# Patient Record
Sex: FEMALE | Race: WHITE | NOT HISPANIC OR LATINO | Employment: OTHER | URBAN - NONMETROPOLITAN AREA
[De-identification: names, ages, dates, MRNs, and addresses within clinical notes are randomized per-mention and may not be internally consistent; named-entity substitution may affect disease eponyms.]

---

## 2020-08-07 ENCOUNTER — ANESTHESIA (OUTPATIENT)
Dept: EMERGENCY MEDICINE | Facility: OTHER | Age: 65
DRG: 178 | End: 2020-08-07
Payer: MEDICARE

## 2020-08-07 ENCOUNTER — ANESTHESIA EVENT (OUTPATIENT)
Dept: EMERGENCY MEDICINE | Facility: OTHER | Age: 65
DRG: 178 | End: 2020-08-07
Payer: MEDICARE

## 2020-08-07 ENCOUNTER — APPOINTMENT (OUTPATIENT)
Dept: GENERAL RADIOLOGY | Facility: OTHER | Age: 65
DRG: 178 | End: 2020-08-07
Payer: MEDICARE

## 2020-08-07 ENCOUNTER — APPOINTMENT (OUTPATIENT)
Dept: CT IMAGING | Facility: OTHER | Age: 65
DRG: 178 | End: 2020-08-07
Attending: FAMILY MEDICINE
Payer: MEDICARE

## 2020-08-07 ENCOUNTER — HOSPITAL ENCOUNTER (INPATIENT)
Facility: OTHER | Age: 65
LOS: 5 days | Discharge: HOME | DRG: 178 | End: 2020-08-12
Attending: FAMILY MEDICINE | Admitting: INTERNAL MEDICINE
Payer: MEDICARE

## 2020-08-07 ENCOUNTER — APPOINTMENT (OUTPATIENT)
Dept: ULTRASOUND IMAGING | Facility: OTHER | Age: 65
DRG: 178 | End: 2020-08-07
Attending: EMERGENCY MEDICINE
Payer: MEDICARE

## 2020-08-07 DIAGNOSIS — E86.1 HYPOVOLEMIA: ICD-10-CM

## 2020-08-07 DIAGNOSIS — R55 SYNCOPE, UNSPECIFIED SYNCOPE TYPE: ICD-10-CM

## 2020-08-07 DIAGNOSIS — I95.9 HYPOTENSION, UNSPECIFIED HYPOTENSION TYPE: ICD-10-CM

## 2020-08-07 DIAGNOSIS — C91.11 CHRONIC LYMPHOID LEUKEMIA IN REMISSION (H): ICD-10-CM

## 2020-08-07 DIAGNOSIS — C92.11 CHRONIC MYELOID LEUKEMIA IN REMISSION (H): Primary | ICD-10-CM

## 2020-08-07 DIAGNOSIS — E86.9 VOLUME DEPLETION: ICD-10-CM

## 2020-08-07 DIAGNOSIS — Z03.818 ENCNTR FOR OBS FOR SUSP EXPSR TO OTH BIOLG AGENTS RULED OUT: ICD-10-CM

## 2020-08-07 DIAGNOSIS — E86.9 VOLUME DEPLETION, UNSPECIFIED: ICD-10-CM

## 2020-08-07 DIAGNOSIS — Z79.899 ENCOUNTER FOR LONG-TERM (CURRENT) USE OF OTHER MEDICATIONS: ICD-10-CM

## 2020-08-07 DIAGNOSIS — E86.1 HYPOTENSION DUE TO HYPOVOLEMIA: ICD-10-CM

## 2020-08-07 DIAGNOSIS — J18.9 PNEUMONIA DUE TO INFECTIOUS ORGANISM, UNSPECIFIED LATERALITY, UNSPECIFIED PART OF LUNG: ICD-10-CM

## 2020-08-07 PROBLEM — N17.9 AKI (ACUTE KIDNEY INJURY) (H): Status: ACTIVE | Noted: 2020-08-07

## 2020-08-07 PROBLEM — D75.89 MACROCYTOSIS WITHOUT ANEMIA: Status: ACTIVE | Noted: 2017-11-21

## 2020-08-07 PROBLEM — R79.89 D-DIMER, ELEVATED: Status: ACTIVE | Noted: 2020-08-07

## 2020-08-07 LAB
ALBUMIN SERPL-MCNC: 3.9 G/DL (ref 3.5–5.7)
ALBUMIN UR-MCNC: 10 MG/DL
ALP SERPL-CCNC: 55 U/L (ref 34–104)
ALT SERPL W P-5'-P-CCNC: 15 U/L (ref 7–52)
ANION GAP SERPL CALCULATED.3IONS-SCNC: 8 MMOL/L (ref 3–14)
APPEARANCE UR: CLEAR
APTT PPP: 21 SEC (ref 22–37)
AST SERPL W P-5'-P-CCNC: 21 U/L (ref 13–39)
BACTERIA #/AREA URNS HPF: ABNORMAL /HPF
BASOPHILS # BLD AUTO: 0 10E9/L (ref 0–0.2)
BASOPHILS NFR BLD AUTO: 0.2 %
BILIRUB SERPL-MCNC: 0.3 MG/DL (ref 0.3–1)
BILIRUB UR QL STRIP: NEGATIVE
BUN SERPL-MCNC: 18 MG/DL (ref 7–25)
CALCIUM SERPL-MCNC: 8.8 MG/DL (ref 8.6–10.3)
CHLORIDE SERPL-SCNC: 106 MMOL/L (ref 98–107)
CO2 SERPL-SCNC: 23 MMOL/L (ref 21–31)
COLOR UR AUTO: ABNORMAL
CREAT SERPL-MCNC: 1.24 MG/DL (ref 0.6–1.2)
D DIMER PPP DDU-MCNC: 2399 NG/ML D-DU (ref 0–230)
DIFFERENTIAL METHOD BLD: ABNORMAL
EOSINOPHIL # BLD AUTO: 0 10E9/L (ref 0–0.7)
EOSINOPHIL NFR BLD AUTO: 0 %
ERYTHROCYTE [DISTWIDTH] IN BLOOD BY AUTOMATED COUNT: 14.4 % (ref 10–15)
GFR SERPL CREATININE-BSD FRML MDRD: 43 ML/MIN/{1.73_M2}
GLUCOSE SERPL-MCNC: 157 MG/DL (ref 70–105)
GLUCOSE UR STRIP-MCNC: NEGATIVE MG/DL
HCT VFR BLD AUTO: 36.1 % (ref 35–47)
HGB BLD-MCNC: 11.8 G/DL (ref 11.7–15.7)
HGB UR QL STRIP: NEGATIVE
HYALINE CASTS #/AREA URNS LPF: 80 /LPF (ref 0–2)
IMM GRANULOCYTES # BLD: 0 10E9/L (ref 0–0.4)
IMM GRANULOCYTES NFR BLD: 0.4 %
INR PPP: 0.95 (ref 0–1.3)
KETONES UR STRIP-MCNC: NEGATIVE MG/DL
LABORATORY COMMENT REPORT: NORMAL
LACTATE BLD-SCNC: 2.8 MMOL/L (ref 0.7–2)
LACTATE BLD-SCNC: 2.9 MMOL/L (ref 0.7–2)
LEUKOCYTE ESTERASE UR QL STRIP: ABNORMAL
LIPASE SERPL-CCNC: 32 U/L (ref 11–82)
LYMPHOCYTES # BLD AUTO: 0.5 10E9/L (ref 0.8–5.3)
LYMPHOCYTES NFR BLD AUTO: 5.7 %
MAGNESIUM SERPL-MCNC: 2.1 MG/DL (ref 1.9–2.7)
MCH RBC QN AUTO: 34.5 PG (ref 26.5–33)
MCHC RBC AUTO-ENTMCNC: 32.7 G/DL (ref 31.5–36.5)
MCV RBC AUTO: 106 FL (ref 78–100)
MONOCYTES # BLD AUTO: 0.3 10E9/L (ref 0–1.3)
MONOCYTES NFR BLD AUTO: 3.9 %
MUCOUS THREADS #/AREA URNS LPF: PRESENT /LPF
NEUTROPHILS # BLD AUTO: 7.4 10E9/L (ref 1.6–8.3)
NEUTROPHILS NFR BLD AUTO: 89.8 %
NITRATE UR QL: NEGATIVE
PH UR STRIP: 6 PH (ref 5–7)
PLATELET # BLD AUTO: 149 10E9/L (ref 150–450)
POTASSIUM SERPL-SCNC: 3.8 MMOL/L (ref 3.5–5.1)
PROCALCITONIN SERPL-MCNC: <0.5 NG/ML
PROT SERPL-MCNC: 6 G/DL (ref 6.4–8.9)
RBC # BLD AUTO: 3.42 10E12/L (ref 3.8–5.2)
RBC #/AREA URNS AUTO: 10 /HPF (ref 0–2)
SARS-COV-2 RNA SPEC QL NAA+PROBE: NEGATIVE
SARS-COV-2 RNA SPEC QL NAA+PROBE: NORMAL
SODIUM SERPL-SCNC: 137 MMOL/L (ref 134–144)
SOURCE: ABNORMAL
SP GR UR STRIP: >1.035 (ref 1–1.03)
SPECIMEN SOURCE: NORMAL
SPECIMEN SOURCE: NORMAL
SQUAMOUS #/AREA URNS AUTO: 11 /HPF (ref 0–1)
TROPONIN I SERPL-MCNC: 5.1 PG/ML
UROBILINOGEN UR STRIP-MCNC: NORMAL MG/DL (ref 0–2)
WBC # BLD AUTO: 8.2 10E9/L (ref 4–11)
WBC #/AREA URNS AUTO: 29 /HPF (ref 0–5)

## 2020-08-07 PROCEDURE — 85025 COMPLETE CBC W/AUTO DIFF WBC: CPT | Performed by: FAMILY MEDICINE

## 2020-08-07 PROCEDURE — 87040 BLOOD CULTURE FOR BACTERIA: CPT | Performed by: FAMILY MEDICINE

## 2020-08-07 PROCEDURE — 84484 ASSAY OF TROPONIN QUANT: CPT | Performed by: FAMILY MEDICINE

## 2020-08-07 PROCEDURE — 36415 COLL VENOUS BLD VENIPUNCTURE: CPT | Mod: XU | Performed by: EMERGENCY MEDICINE

## 2020-08-07 PROCEDURE — 83605 ASSAY OF LACTIC ACID: CPT | Mod: 91 | Performed by: EMERGENCY MEDICINE

## 2020-08-07 PROCEDURE — 25000128 H RX IP 250 OP 636: Performed by: INTERNAL MEDICINE

## 2020-08-07 PROCEDURE — 93970 EXTREMITY STUDY: CPT

## 2020-08-07 PROCEDURE — 87635 SARS-COV-2 COVID-19 AMP PRB: CPT | Performed by: EMERGENCY MEDICINE

## 2020-08-07 PROCEDURE — 71260 CT THORAX DX C+: CPT

## 2020-08-07 PROCEDURE — 25000125 ZZHC RX 250: Performed by: INTERNAL MEDICINE

## 2020-08-07 PROCEDURE — 83690 ASSAY OF LIPASE: CPT | Performed by: FAMILY MEDICINE

## 2020-08-07 PROCEDURE — 25000128 H RX IP 250 OP 636: Performed by: FAMILY MEDICINE

## 2020-08-07 PROCEDURE — 83605 ASSAY OF LACTIC ACID: CPT | Performed by: FAMILY MEDICINE

## 2020-08-07 PROCEDURE — 96366 THER/PROPH/DIAG IV INF ADDON: CPT | Performed by: FAMILY MEDICINE

## 2020-08-07 PROCEDURE — 96365 THER/PROPH/DIAG IV INF INIT: CPT | Performed by: FAMILY MEDICINE

## 2020-08-07 PROCEDURE — 25800030 ZZH RX IP 258 OP 636: Performed by: INTERNAL MEDICINE

## 2020-08-07 PROCEDURE — 96375 TX/PRO/DX INJ NEW DRUG ADDON: CPT | Performed by: FAMILY MEDICINE

## 2020-08-07 PROCEDURE — 81001 URINALYSIS AUTO W/SCOPE: CPT | Performed by: FAMILY MEDICINE

## 2020-08-07 PROCEDURE — 85730 THROMBOPLASTIN TIME PARTIAL: CPT | Performed by: FAMILY MEDICINE

## 2020-08-07 PROCEDURE — 25000125 ZZHC RX 250: Performed by: FAMILY MEDICINE

## 2020-08-07 PROCEDURE — 25500064 ZZH RX 255 OP 636: Performed by: EMERGENCY MEDICINE

## 2020-08-07 PROCEDURE — 85379 FIBRIN DEGRADATION QUANT: CPT | Performed by: FAMILY MEDICINE

## 2020-08-07 PROCEDURE — 25800030 ZZH RX IP 258 OP 636: Performed by: EMERGENCY MEDICINE

## 2020-08-07 PROCEDURE — 93010 ELECTROCARDIOGRAM REPORT: CPT | Performed by: INTERNAL MEDICINE

## 2020-08-07 PROCEDURE — C9803 HOPD COVID-19 SPEC COLLECT: HCPCS | Performed by: FAMILY MEDICINE

## 2020-08-07 PROCEDURE — 99285 EMERGENCY DEPT VISIT HI MDM: CPT | Mod: 25 | Performed by: FAMILY MEDICINE

## 2020-08-07 PROCEDURE — 84145 PROCALCITONIN (PCT): CPT | Performed by: FAMILY MEDICINE

## 2020-08-07 PROCEDURE — 94640 AIRWAY INHALATION TREATMENT: CPT

## 2020-08-07 PROCEDURE — 99223 1ST HOSP IP/OBS HIGH 75: CPT | Mod: AI | Performed by: INTERNAL MEDICINE

## 2020-08-07 PROCEDURE — 25000128 H RX IP 250 OP 636: Performed by: EMERGENCY MEDICINE

## 2020-08-07 PROCEDURE — 25000132 ZZH RX MED GY IP 250 OP 250 PS 637: Mod: GY | Performed by: INTERNAL MEDICINE

## 2020-08-07 PROCEDURE — 93005 ELECTROCARDIOGRAM TRACING: CPT | Performed by: FAMILY MEDICINE

## 2020-08-07 PROCEDURE — 25000125 ZZHC RX 250: Performed by: NURSE ANESTHETIST, CERTIFIED REGISTERED

## 2020-08-07 PROCEDURE — 80053 COMPREHEN METABOLIC PANEL: CPT | Performed by: FAMILY MEDICINE

## 2020-08-07 PROCEDURE — 85610 PROTHROMBIN TIME: CPT | Performed by: FAMILY MEDICINE

## 2020-08-07 PROCEDURE — 36415 COLL VENOUS BLD VENIPUNCTURE: CPT | Performed by: FAMILY MEDICINE

## 2020-08-07 PROCEDURE — 36600 WITHDRAWAL OF ARTERIAL BLOOD: CPT | Performed by: FAMILY MEDICINE

## 2020-08-07 PROCEDURE — 36598 INJ W/FLUOR EVAL CV DEVICE: CPT | Mod: RT

## 2020-08-07 PROCEDURE — 99285 EMERGENCY DEPT VISIT HI MDM: CPT | Mod: Z6 | Performed by: FAMILY MEDICINE

## 2020-08-07 PROCEDURE — 87086 URINE CULTURE/COLONY COUNT: CPT | Performed by: EMERGENCY MEDICINE

## 2020-08-07 PROCEDURE — 40000275 ZZH STATISTIC RCP TIME EA 10 MIN

## 2020-08-07 PROCEDURE — 83735 ASSAY OF MAGNESIUM: CPT | Performed by: INTERNAL MEDICINE

## 2020-08-07 PROCEDURE — 12000000 ZZH R&B MED SURG/OB

## 2020-08-07 PROCEDURE — 25800030 ZZH RX IP 258 OP 636: Performed by: FAMILY MEDICINE

## 2020-08-07 PROCEDURE — 36410 VNPNXR 3YR/> PHY/QHP DX/THER: CPT | Performed by: NURSE ANESTHETIST, CERTIFIED REGISTERED

## 2020-08-07 RX ORDER — LIDOCAINE HYDROCHLORIDE 10 MG/ML
INJECTION, SOLUTION EPIDURAL; INFILTRATION; INTRACAUDAL; PERINEURAL PRN
Status: DISCONTINUED | OUTPATIENT
Start: 2020-08-07 | End: 2020-08-07

## 2020-08-07 RX ORDER — AMOXICILLIN 250 MG
2 CAPSULE ORAL 2 TIMES DAILY PRN
Status: DISCONTINUED | OUTPATIENT
Start: 2020-08-07 | End: 2020-08-12 | Stop reason: HOSPADM

## 2020-08-07 RX ORDER — PROCHLORPERAZINE 25 MG
12.5 SUPPOSITORY, RECTAL RECTAL EVERY 12 HOURS PRN
Status: DISCONTINUED | OUTPATIENT
Start: 2020-08-07 | End: 2020-08-12 | Stop reason: HOSPADM

## 2020-08-07 RX ORDER — MAGNESIUM SULFATE HEPTAHYDRATE 40 MG/ML
4 INJECTION, SOLUTION INTRAVENOUS EVERY 4 HOURS PRN
Status: DISCONTINUED | OUTPATIENT
Start: 2020-08-07 | End: 2020-08-11

## 2020-08-07 RX ORDER — IODIXANOL 320 MG/ML
100 INJECTION, SOLUTION INTRAVASCULAR ONCE
Status: COMPLETED | OUTPATIENT
Start: 2020-08-07 | End: 2020-08-07

## 2020-08-07 RX ORDER — POTASSIUM CHLORIDE 1500 MG/1
20-40 TABLET, EXTENDED RELEASE ORAL
Status: DISCONTINUED | OUTPATIENT
Start: 2020-08-07 | End: 2020-08-12

## 2020-08-07 RX ORDER — ALBUTEROL SULFATE 0.83 MG/ML
2.5 SOLUTION RESPIRATORY (INHALATION)
Status: DISCONTINUED | OUTPATIENT
Start: 2020-08-07 | End: 2020-08-12 | Stop reason: HOSPADM

## 2020-08-07 RX ORDER — AMOXICILLIN 250 MG
1 CAPSULE ORAL 2 TIMES DAILY PRN
Status: DISCONTINUED | OUTPATIENT
Start: 2020-08-07 | End: 2020-08-12 | Stop reason: HOSPADM

## 2020-08-07 RX ORDER — ONDANSETRON 4 MG/1
4 TABLET, ORALLY DISINTEGRATING ORAL EVERY 6 HOURS PRN
Status: DISCONTINUED | OUTPATIENT
Start: 2020-08-07 | End: 2020-08-12 | Stop reason: HOSPADM

## 2020-08-07 RX ORDER — LIDOCAINE 40 MG/G
CREAM TOPICAL
Status: DISCONTINUED | OUTPATIENT
Start: 2020-08-07 | End: 2020-08-12 | Stop reason: HOSPADM

## 2020-08-07 RX ORDER — SODIUM CHLORIDE 9 MG/ML
INJECTION, SOLUTION INTRAVENOUS CONTINUOUS
Status: DISCONTINUED | OUTPATIENT
Start: 2020-08-07 | End: 2020-08-08

## 2020-08-07 RX ORDER — IODIXANOL 320 MG/ML
30 INJECTION, SOLUTION INTRAVASCULAR ONCE
Status: COMPLETED | OUTPATIENT
Start: 2020-08-07 | End: 2020-08-07

## 2020-08-07 RX ORDER — IPRATROPIUM BROMIDE AND ALBUTEROL SULFATE 2.5; .5 MG/3ML; MG/3ML
3 SOLUTION RESPIRATORY (INHALATION)
Status: DISCONTINUED | OUTPATIENT
Start: 2020-08-07 | End: 2020-08-11

## 2020-08-07 RX ORDER — POTASSIUM CHLORIDE 7.45 MG/ML
10 INJECTION INTRAVENOUS
Status: DISCONTINUED | OUTPATIENT
Start: 2020-08-07 | End: 2020-08-12

## 2020-08-07 RX ORDER — ONDANSETRON 2 MG/ML
4 INJECTION INTRAMUSCULAR; INTRAVENOUS EVERY 6 HOURS PRN
Status: DISCONTINUED | OUTPATIENT
Start: 2020-08-07 | End: 2020-08-12 | Stop reason: HOSPADM

## 2020-08-07 RX ORDER — ALUMINA, MAGNESIA, AND SIMETHICONE 2400; 2400; 240 MG/30ML; MG/30ML; MG/30ML
30 SUSPENSION ORAL EVERY 4 HOURS PRN
Status: DISCONTINUED | OUTPATIENT
Start: 2020-08-07 | End: 2020-08-12 | Stop reason: HOSPADM

## 2020-08-07 RX ORDER — ACETAMINOPHEN 325 MG/1
650 TABLET ORAL EVERY 4 HOURS PRN
Status: DISCONTINUED | OUTPATIENT
Start: 2020-08-07 | End: 2020-08-12 | Stop reason: HOSPADM

## 2020-08-07 RX ORDER — IBUPROFEN 200 MG
200 TABLET ORAL EVERY 6 HOURS PRN
Status: DISCONTINUED | OUTPATIENT
Start: 2020-08-07 | End: 2020-08-12 | Stop reason: HOSPADM

## 2020-08-07 RX ORDER — IBUPROFEN 600 MG/1
600 TABLET, FILM COATED ORAL EVERY 6 HOURS PRN
Status: DISCONTINUED | OUTPATIENT
Start: 2020-08-07 | End: 2020-08-07

## 2020-08-07 RX ORDER — NALOXONE HYDROCHLORIDE 0.4 MG/ML
.1-.4 INJECTION, SOLUTION INTRAMUSCULAR; INTRAVENOUS; SUBCUTANEOUS
Status: DISCONTINUED | OUTPATIENT
Start: 2020-08-07 | End: 2020-08-12 | Stop reason: HOSPADM

## 2020-08-07 RX ORDER — SODIUM CHLORIDE, SODIUM LACTATE, POTASSIUM CHLORIDE, CALCIUM CHLORIDE 600; 310; 30; 20 MG/100ML; MG/100ML; MG/100ML; MG/100ML
INJECTION, SOLUTION INTRAVENOUS CONTINUOUS
Status: DISCONTINUED | OUTPATIENT
Start: 2020-08-07 | End: 2020-08-07

## 2020-08-07 RX ORDER — ONDANSETRON 2 MG/ML
8 INJECTION INTRAMUSCULAR; INTRAVENOUS ONCE
Status: COMPLETED | OUTPATIENT
Start: 2020-08-07 | End: 2020-08-07

## 2020-08-07 RX ORDER — PROCHLORPERAZINE MALEATE 5 MG
5 TABLET ORAL EVERY 6 HOURS PRN
Status: DISCONTINUED | OUTPATIENT
Start: 2020-08-07 | End: 2020-08-12 | Stop reason: HOSPADM

## 2020-08-07 RX ADMIN — Medication 20 MG: at 05:55

## 2020-08-07 RX ADMIN — ONDANSETRON 8 MG: 2 INJECTION INTRAMUSCULAR; INTRAVENOUS at 05:43

## 2020-08-07 RX ADMIN — SODIUM CHLORIDE: 9 INJECTION, SOLUTION INTRAVENOUS at 16:39

## 2020-08-07 RX ADMIN — SODIUM CHLORIDE 1000 ML: 9 INJECTION, SOLUTION INTRAVENOUS at 05:46

## 2020-08-07 RX ADMIN — LIDOCAINE HYDROCHLORIDE 0.5 ML: 10 INJECTION, SOLUTION EPIDURAL; INFILTRATION; INTRACAUDAL; PERINEURAL at 09:00

## 2020-08-07 RX ADMIN — SODIUM CHLORIDE 1000 ML: 9 INJECTION, SOLUTION INTRAVENOUS at 15:27

## 2020-08-07 RX ADMIN — SODIUM CHLORIDE 1000 ML: 9 INJECTION, SOLUTION INTRAVENOUS at 06:47

## 2020-08-07 RX ADMIN — LIDOCAINE HYDROCHLORIDE 0.2 ML: 10 INJECTION, SOLUTION EPIDURAL; INFILTRATION; INTRACAUDAL; PERINEURAL at 08:32

## 2020-08-07 RX ADMIN — IODIXANOL 100 ML: 320 INJECTION, SOLUTION INTRAVASCULAR at 12:29

## 2020-08-07 RX ADMIN — SODIUM CHLORIDE, POTASSIUM CHLORIDE, SODIUM LACTATE AND CALCIUM CHLORIDE: 600; 310; 30; 20 INJECTION, SOLUTION INTRAVENOUS at 08:36

## 2020-08-07 RX ADMIN — IODIXANOL 89 ML: 320 INJECTION, SOLUTION INTRAVASCULAR at 07:41

## 2020-08-07 RX ADMIN — TAZOBACTAM SODIUM AND PIPERACILLIN SODIUM 3.38 G: 375; 3 INJECTION, SOLUTION INTRAVENOUS at 23:45

## 2020-08-07 RX ADMIN — ENOXAPARIN SODIUM 80 MG: 80 INJECTION SUBCUTANEOUS at 18:47

## 2020-08-07 RX ADMIN — IBUPROFEN 200 MG: 200 TABLET, FILM COATED ORAL at 19:55

## 2020-08-07 RX ADMIN — IPRATROPIUM BROMIDE AND ALBUTEROL SULFATE 3 ML: .5; 3 SOLUTION RESPIRATORY (INHALATION) at 18:25

## 2020-08-07 RX ADMIN — TAZOBACTAM SODIUM AND PIPERACILLIN SODIUM 3.38 G: 375; 3 INJECTION, SOLUTION INTRAVENOUS at 18:47

## 2020-08-07 RX ADMIN — LIDOCAINE HYDROCHLORIDE 0.5 ML: 10 INJECTION, SOLUTION EPIDURAL; INFILTRATION; INTRACAUDAL; PERINEURAL at 08:46

## 2020-08-07 RX ADMIN — IODIXANOL 30 ML: 320 INJECTION, SOLUTION INTRAVASCULAR at 11:34

## 2020-08-07 RX ADMIN — TAZOBACTAM SODIUM AND PIPERACILLIN SODIUM 3.38 G: 375; 3 INJECTION, SOLUTION INTRAVENOUS at 13:12

## 2020-08-07 ASSESSMENT — ACTIVITIES OF DAILY LIVING (ADL)
BATHING: 0-->INDEPENDENT
TRANSFERRING: 0-->INDEPENDENT
FALL_HISTORY_WITHIN_LAST_SIX_MONTHS: NO
COGNITION: 0 - NO COGNITION ISSUES REPORTED
SWALLOWING: 0-->SWALLOWS FOODS/LIQUIDS WITHOUT DIFFICULTY
RETIRED_EATING: 0-->INDEPENDENT
ADLS_ACUITY_SCORE: 13
AMBULATION: 0-->INDEPENDENT
TOILETING: 0-->INDEPENDENT
RETIRED_COMMUNICATION: 0-->UNDERSTANDS/COMMUNICATES WITHOUT DIFFICULTY
ADLS_ACUITY_SCORE: 13
DRESS: 0-->INDEPENDENT

## 2020-08-07 ASSESSMENT — ENCOUNTER SYMPTOMS
EYE REDNESS: 0
BRUISES/BLEEDS EASILY: 0
DYSURIA: 0
HEADACHES: 0
FEVER: 0
PALPITATIONS: 0
NAUSEA: 0
WEAKNESS: 1
ABDOMINAL PAIN: 0
SORE THROAT: 0
LIGHT-HEADEDNESS: 1
DIARRHEA: 0
COUGH: 1
VOMITING: 0
FLANK PAIN: 0
FATIGUE: 1
CHILLS: 0
SHORTNESS OF BREATH: 0
BACK PAIN: 0

## 2020-08-07 ASSESSMENT — MIFFLIN-ST. JEOR
SCORE: 1377.76
SCORE: 1358.26
SCORE: 1354.17

## 2020-08-07 NOTE — PROGRESS NOTES
Pt came to CT for CTA of Chest for PE. 18ga in RUE bicep. Flushed NaCl manually with no discomfort to patient. Flushed with Injector and slight discomfort, mostly at IV insertion site. Proceded with CT exam and injected IV Contrast 89mL of Visipaque 320 and did not have any contrast on monitoring images. Checked patient's arm immediately and noted large swelling involving the contrast at IV site. Decision to discontinue imaging study and return patient to ER4.   Notified NABEEL Seo of situation and told her to ice patient's site for now and eval for further swelling. Patient was also given instruction paper about the infiltration and care of her site.

## 2020-08-07 NOTE — H&P
[facility]    History and Physical  Hospitalist       Date of Admission:  8/7/2020    Assessment & Plan   Alison Norwood is a 65 year old female who presents with probable aspiration pneumonia.    Principal Problem:    Pneumonia    Assessment: Elevated lactate, bilateral infiltrates, supplemental oxygen requirement all clinically consistent.  No recent hospitalizations.  Given her syncope and vomiting high suspicion for aspiration.    Plan:   -IV Zosyn day 1  -Scheduled and as needed nebulizer's  - follow-up blood and urine cultures  -COVID negative  -Obtain sputum culture if able  -Incentive spirometry      D-dimer, elevated    Assessment: Severely elevated, possibly an acute phase reactant in the setting of active infection, lower extremity ultrasound negative for DVT.  CT PE protocol nondiagnostic given multiple unsuccessful IV attempts and right EJ infiltration with contrast.  Discussed options regarding PICC line placement with transfer to [place] for repeat PE protocol versus empiric PE treatment with anticoagulation with reassessment of renal function in a.m. with reattempt at more adequate IV placement and CT in a.m. and both patient and  prefer this option.  They do not want to consider internal jugular, femoral or subclavian central lines.  Given no contraindication to anticoagulation.  Treatment and optimization of renal function is reasonable.    Plan:   -Lovenox 80 mg IV every 12  -Reattempt CT PE protocol in a.m.      CHANG (acute kidney injury) (H)    Assessment: Likely prerenal in the setting of acute infection.    Plan:   -Repeat 1 L fluid bolus  -Trend lactate   -monitor renal function daily  -Avoid NSAIDs    Syncope  Assessment: Possibly orthostasis versus vasovagal given her prodrome.  Plan:   -Telemetry   -check orthostatics  -Trend troponins      Chronic myeloid leukemia in remission (H)    Assessment: Chronic    Plan:   -Hold home Gleevec while in  hospital    FEN: regular diet, normal saline at 125 mL/hr  PPX: lovenox    Code Status: No Order    Taras Yusuf    Primary Care Physician   Physician No Ref-Primary    Chief Complaint   syncope    History is obtained from the patient and chart review.    History of Present Illness   Alison Norwood is a 65 year old female who presents with syncope.  Patient has been her normal state of health but started feeling poorly last night after eating dinner.  She felt nauseated like she is going to vomit.  Early this morning she awoke with nausea and vomiting, she was sitting in a chair after a bout of vomiting, felt lightheaded like she was going to faint, lowered herself to the ground, fainted and had full loss of consciousness for nearly 30 seconds, when she came to she had noticed she had vomited and that she was very short of breath with a new nonproductive cough.  She felt extremely weak, EMS was called, she had a blood pressure of 84/55 on initial eval, and was brought to the ER.    In the ER she received Zosyn, 1 L of IV fluids, lactate improved, had multiple issues with IV infiltration including a right EJ for CT PE protocol and she was subsequently admitted for further management.    Past Medical History    I have reviewed this patient's medical history and updated it with pertinent information if needed.   History reviewed. No pertinent past medical history.    Past Surgical History   I have reviewed this patient's surgical history and updated it with pertinent information if needed.  Past Surgical History:   Procedure Laterality Date     IR PORT CHECK RIGHT  8/7/2020       Prior to Admission Medications   Prior to Admission Medications   Prescriptions Last Dose Informant Patient Reported? Taking?   imatinib (GLEEVEC) 400 MG TABS tablet 8/6/2020 at am  Yes Yes   Sig: Take 400 mg by mouth daily      Facility-Administered Medications: None     Allergies   No Known Allergies    Social History   I have reviewed this  patient's social history and updated it with pertinent information if needed. Alison Norwood  reports that she has never smoked. She has never used smokeless tobacco. She reports current alcohol use. She reports that she does not use drugs.    Family History   I have reviewed this patient's family history and updated it with pertinent information if needed.   History reviewed. No pertinent family history.    Review of Systems     REVIEW OF SYSTEMS:    Constitutional: poor energy and appetite, no recent sick contacts, no flu like symptoms.   Eyes: no changes in vision  Ears, nose, mouth, throat, and face: no mouth sores, dysphagia, or odynophagia  Respiratory: no shortness of breath, ongoing nonproductive cough, no wheezing.  Cardiovascular: no chest pain, palpitations, orthopnea, increased lower extremity edema.  Gastrointestinal: no constipation, couple episodes of diarrhea that are now resolved, no further nausea or vomiting, no abdominal pain.   Genitourinary: no dysuria, hematuria, urgency or frequency.   Hematologic/lymphatic: no unintentional weight loss or night sweats.  Musculoskeletal: no pain to extremities.  Endocrine: not a known diabetic.     Physical Exam   Temp: 97.7  F (36.5  C) Temp src: Tympanic BP: 111/58 Pulse: 95 Heart Rate: 103 Resp: 24 SpO2: 95 % O2 Device: None (Room air) Oxygen Delivery: 2 LPM  Vital Signs with Ranges  Temp:  [97.7  F (36.5  C)] 97.7  F (36.5  C)  Pulse:  [] 95  Heart Rate:  [] 103  Resp:  [11-37] 24  BP: ()/() 111/58  SpO2:  [87 %-100 %] 95 %  176 lbs 11.2 oz    Exam:  GENERAL: Talkative, in no apparent distress.  Head: normocephalic and atraumatic  Eyes: anicteric and non-injected sclera  Nose: no rhinorrhea or epistaxis.   Throat: moist mucous membranes with no active oral lesions.  NECK: Supple, jugular venous distension not present.  Right neck with asymmetric nonerythematous edema which has been outlined with marker at site of EJ contrast  extravasation.  CARDIOVASCULAR: regular rate and rhythm, no murmurs, rubs, or gallops. Normal S1/S2. No lower extremity edema.   RESPIRATORY: clear to auscultation bilaterally, no wheezes, no crackles.  No accessory muscle use or evidence of respiratory distress.   GI: soft, non-tender, non-distended, normoactive bowel sounds.  MUSCULOSKELETAL: warm and well perfused, 2+ dorsalis pedis pulses.    SKIN: no pallor, jaundice or rashes.  NEUROLOGY: AAOx3, follows commands, speech and language without focal deficits.        Data   Data reviewed today:  I personally reviewed no images or EKG's today.  Recent Labs   Lab 08/07/20  0600   WBC 8.2   HGB 11.8   *   *   INR 0.95      POTASSIUM 3.8   CHLORIDE 106   CO2 23   BUN 18   CR 1.24*   ANIONGAP 8   MOHINDER 8.8   *   ALBUMIN 3.9   PROTTOTAL 6.0*   BILITOTAL 0.3   ALKPHOS 55   ALT 15   AST 21   LIPASE 32       Recent Results (from the past 24 hour(s))   IR Port Check Right    Narrative    IR PORT CHECK RIGHT    HISTORY: 65 years Female yes  with recent placement of left-sided  external jugular catheter. The provider was uncertain of position of  the catheter, there was no hold back of blood from the lumen.    COMPARISON: None    TECHNIQUE: 30 cc of contrast was injected under fluoroscopic  observation. There is opacification of the external jugular or branch  leading to the external jugular. There was no extravasation of  contrast.    Findings and     Impression    impression: Intraluminal location of central line. The tip of the  central line is within the right external jugular vein or a smaller  side branch of the external jugular vein    ROMAIN VELOZ MD   CT Chest w Contrast    Narrative    PROCEDURE: CT CHEST W CONTRAST 8/7/2020 12:30 PM    HISTORY: PE suspected, intermediate prob, positive D-dimer    COMPARISONS: None.    Meds/Dose Given: Visipaque 320 100 mL    TECHNIQUE: CT angiogram of the chest was performed with IV contrast  sagittal  coronal reconstructions were obtained.    FINDINGS: There is contrast extravasation in the right side of the  neck. No opacification of the pulmonary arteries was obtained.  Extensive alveolar opacities are noted in both lungs most severe in  the lower lobes bilaterally with air bronchograms. Significant opacity  is also present in the right upper lobe and mildly opacity is seen in  the left upper lobe. These opacities are suspicious for pneumonia. The  hilar and mediastinal lymph nodes are normal in caliber. Axillary and  supraclavicular lymph nodes appear normal. The upper portion of the  liver and spleen appear normal. Degenerative changes are present in  the thoracic spine.         Impression    IMPRESSION: Extensive bilateral alveolar opacities in both lungs most  consistent with pneumonia.    OLESYA FRIEND MD   US Lower Extremity Venous Duplex Bilateral    Narrative    Exam:US LOWER EXTREMITY VENOUS DUPLEX BILATERAL    History:  65 years Female   : With shortness of breath and elevated  d-dimer. Patient failed CT angiography.    Comparisons: None    Technique: Venous duplex ultrasonography of the bilateral lower  extremities was performed.     Findings: The common femoral veins, superficial femoral veins and  popliteal veins are fully compressible with spontaneous and  augmentable venous flow.           Impression    Impression: No evidence of deep venous thrombosis within the lower  extremities.    ROMAIN VELOZ MD

## 2020-08-07 NOTE — PROGRESS NOTES
Request for peripheral IV for PE study.      Previous IV infiltrated during CT scan. Multiple attempts were made by RNs and 2 other CRNAs.      Ultrasound was used to visualize both upper extremities. Able to visualize one very small vein in her left upper arm. Attempted to access this vein using a 20 gauge arrowcatheter with inline wire.  Unable to access vein. The right upper arm was used for the second attempt. The A/C was not available due to previous infiltration. Unable to access right upper arm vein.  Right neck-external jugular vein was used for third attempt.  Able to access vein using a long (3 inch) 18 gauge IV needle, but the catheter was difficult to advance.  Withdrew needle until blood was able to be aspirated. Catheter was able to be advanced the second time, but the catheter was not able to be flushed easily.  Withdrew catheter again until blood return was present.  The catheter was able to be flushed easily and was secured using a sterile tegaderm.  A venogram was obtained per radiology, which showed that the EJ was access, or a smaller side branch of the EJ with intraluminal location.  Therefore, the EJ line was used for her PE study.  After a test injection was made by the radiology staff, the IV was deemed to be suitable for the study.  However, once the contrast was injected, the IV become infiltrated and a large amount of contrast was injected into the subcutaneus aspect of her neck.  This was outlined by the radiology tech.  I did have a conversation with her ER physician and I recommended that a central line be placed if this study were to continue.      GLENN Marie CRNA on 8/7/2020 at 12:30 PM

## 2020-08-07 NOTE — ED PROVIDER NOTES
History     Chief Complaint   Patient presents with     Nausea, Vomiting, & Diarrhea     HPI  Alison Norwood is a 65 year old female who is brought into the emergency room for evaluation by ambulance from home for evaluation of syncope.  The patient states that she has not been feeling well since yesterday.  She states that she started having nausea, vomiting and diarrhea that started around 1:00 in the morning which was approximately 5 hours prior to arrival here.  Patient states that she felt very lightheaded and so she sat down in a chair and did have a syncopal episode.  She states that because she was already sitting she did not fall.  She states that she has had problems with syncopal episodes frequently in the past so she knew what to expect.  Her  witnessed the syncopal episode and reported to EMS that it lasted approximately 30 seconds.  Patient was found to be quite hypotensive by EMS with a blood pressure of 84/55.    The patient states that she thinks she may have choked on some of the emesis because she has continued to have a cough since she started throwing up this morning.    She denies any fever, sweats, chills, URI type symptoms, sore throat, shortness of breath, chest pain, palpitations, lower extremity pain or swelling, abdominal pain, dysuria, back or flank pain.    Allergies:  No Known Allergies    Problem List:    Patient Active Problem List    Diagnosis Date Noted     Pneumonia 08/07/2020     Priority: Medium     D-dimer, elevated 08/07/2020     Priority: Medium     CHANG (acute kidney injury) (H) 08/07/2020     Priority: Medium     Macrocytosis without anemia 11/21/2017     Priority: Medium     5/29/20 Hem/Onc note: Macrocytosis, chronic, negative evaluation.       Health care maintenance 11/29/2016     Priority: Medium     FIT negative - 11/29/16       Chronic myeloid leukemia in remission (H) 06/02/2011     Priority: Medium     5/29/20 Hem/Onc note: Since then she is treated with  "Gleevec 400 mg a day without interruption, which she takes to this day. While on Gleevec, she had occasional diarrhea. She switched to generic Gleevec and all theses symptoms cleared. Serialized hemograms and the BCR-ABL gene determinations have been negative,  consistent with complete hematologic and major molecular remission  On imatinib.       Esophageal motility disorder 06/02/2011     Priority: Medium     Family hx of colon cancer 05/11/2011     Priority: Medium     IMO Update 10/11          Past Medical History:    Leukemia  History reviewed. No pertinent past medical history.    Past Surgical History:    Past Surgical History:   Procedure Laterality Date     IR PORT CHECK RIGHT  8/7/2020       Family History:    History reviewed. No pertinent family history.    Social History:  Marital Status:    Social History     Tobacco Use     Smoking status: Never Smoker     Smokeless tobacco: Never Used   Substance Use Topics     Alcohol use: Yes     Comment: couple per week     Drug use: Never        Medications:    No current outpatient medications on file.        Review of Systems   Constitutional: Positive for fatigue. Negative for chills and fever.   HENT: Negative for congestion and sore throat.    Eyes: Negative for redness.   Respiratory: Positive for cough. Negative for shortness of breath.    Cardiovascular: Negative for chest pain, palpitations and leg swelling.   Gastrointestinal: Negative for abdominal pain, diarrhea, nausea and vomiting.   Genitourinary: Negative for dysuria and flank pain.   Musculoskeletal: Negative for back pain.   Skin: Positive for pallor.   Neurological: Positive for syncope, weakness and light-headedness. Negative for headaches.   Hematological: Does not bruise/bleed easily.   All other systems reviewed and are negative.      Physical Exam   BP: (!) 79/47  Pulse: 96  Heart Rate: 96  Temp: 97.7  F (36.5  C)  Resp: 20  Height: 167.6 cm (5' 6\")  Weight: 80.2 kg (176 lb 11.2 oz)  SpO2: " (!) 88 %  Lying Orthostatic BP: 112/47  Lying Orthostatic Pulse: 111 bpm  Sitting Orthostatic BP: 119/41  Sitting Orthostatic Pulse: 115 bpm  Standing Orthostatic BP: 112/56  Standing Orthostatic Pulse: 126 bpm      Physical Exam  Vitals signs and nursing note reviewed.   Constitutional:       General: She is not in acute distress.     Appearance: Normal appearance. She is well-developed and normal weight. She is ill-appearing. She is not toxic-appearing or diaphoretic.   HENT:      Head: Normocephalic and atraumatic.      Right Ear: External ear normal.      Left Ear: External ear normal.      Nose: Nose normal.      Mouth/Throat:      Lips: Pink.      Mouth: Mucous membranes are moist.      Pharynx: Oropharynx is clear. Uvula midline.   Eyes:      Extraocular Movements: Extraocular movements intact.      Conjunctiva/sclera: Conjunctivae normal.      Pupils: Pupils are equal, round, and reactive to light.   Neck:      Musculoskeletal: Normal range of motion and neck supple.      Trachea: Trachea and phonation normal.   Cardiovascular:      Rate and Rhythm: Normal rate and regular rhythm.      Pulses: Normal pulses.      Heart sounds: Normal heart sounds. No murmur.   Pulmonary:      Effort: Pulmonary effort is normal.      Breath sounds: Normal breath sounds. No decreased breath sounds, wheezing, rhonchi or rales.   Abdominal:      General: Bowel sounds are normal.      Palpations: Abdomen is soft.      Tenderness: There is no abdominal tenderness.   Musculoskeletal: Normal range of motion.         General: No tenderness.      Right lower leg: No edema.      Left lower leg: No edema.   Lymphadenopathy:      Cervical: No cervical adenopathy.   Skin:     General: Skin is warm.      Capillary Refill: Capillary refill takes less than 2 seconds.      Coloration: Skin is not pale.   Neurological:      Mental Status: She is alert and oriented to person, place, and time.      GCS: GCS eye subscore is 4. GCS verbal subscore  is 5. GCS motor subscore is 6.      Cranial Nerves: Cranial nerves are intact.      Sensory: Sensation is intact.      Motor: Motor function is intact.   Psychiatric:         Mood and Affect: Mood normal.         Behavior: Behavior normal. Behavior is cooperative.         ED Course     Procedures       EKG Interpretation:      Interpreted by Yovany Watters MD, MD  Time reviewed: 0542  Symptoms at time of EKG: none   Rhythm: normal sinus   Rate: normal  Axis: normal  Ectopy: none  Conduction: normal  ST Segments/ T Waves: No ST-T wave changes  Q Waves: none  Comparison to prior: No old EKG available    Clinical Impression: normal EKG    Critical Care time:  none    Results for orders placed or performed during the hospital encounter of 08/07/20 (from the past 24 hour(s))   Legionella pneumonia antigen urine   Result Value Ref Range    Specimen Description Urine     L Pneumo Urine Antigen       Presumptive negative for Legionella pneumophilia serogroup 1 antigen in urine, suggesting   no recent or current infection.  Infection due to Legionella cannot be ruled out, since   other serogroups and species may cause disease, antigen may not be present in urine in   early infection, and the level of antigen present in the urine may be below detectable   limits of the test.     Strep pneumo Agn Urine or CSF    Specimen: Midstream Urine   Result Value Ref Range    Specimen Description Midstream Urine     S Pneumoniae Antigen       Negative, no Streptococcus pneumoniae antigen detected by immunochromatographic membrane   assay. A negative Streptococcus pneumoniae antigen result does not rule out infection with   Streptococcus pneumoniae.     Lactic acid whole blood   Result Value Ref Range    Lactic Acid 2.4 (H) 0.7 - 2.0 mmol/L   CBC with platelets   Result Value Ref Range    WBC 6.2 4.0 - 11.0 10e9/L    RBC Count 2.82 (L) 3.8 - 5.2 10e12/L    Hemoglobin 9.7 (L) 11.7 - 15.7 g/dL    Hematocrit 28.8 (L) 35.0 - 47.0 %    MCV  102 (H) 78 - 100 fl    MCH 34.4 (H) 26.5 - 33.0 pg    MCHC 33.7 31.5 - 36.5 g/dL    RDW 14.2 10.0 - 15.0 %    Platelet Count 169 150 - 450 10e9/L   Basic metabolic panel   Result Value Ref Range    Sodium 142 134 - 144 mmol/L    Potassium 3.7 3.5 - 5.1 mmol/L    Chloride 110 (H) 98 - 107 mmol/L    Carbon Dioxide 25 21 - 31 mmol/L    Anion Gap 7 3 - 14 mmol/L    Glucose 105 70 - 105 mg/dL    Urea Nitrogen 5 (L) 7 - 25 mg/dL    Creatinine 0.71 0.60 - 1.20 mg/dL    GFR Estimate 82 >60 mL/min/[1.73_m2]    GFR Estimate If Black >90 >60 mL/min/[1.73_m2]    Calcium 8.3 (L) 8.6 - 10.3 mg/dL   Magnesium   Result Value Ref Range    Magnesium 1.9 1.9 - 2.7 mg/dL   Lactic acid whole blood   Result Value Ref Range    Lactic Acid 0.9 0.7 - 2.0 mmol/L       Medications   ipratropium - albuterol 0.5 mg/2.5 mg/3 mL (DUONEB) neb solution 3 mL (3 mLs Nebulization Given 8/10/20 0619)   albuterol (PROVENTIL) neb solution 2.5 mg (2.5 mg Nebulization Given 8/9/20 1332)   lidocaine 1 % 0.1-1 mL (0 mLs Other Hold 8/8/20 0957)   lidocaine (LMX4) cream (has no administration in time range)   sodium chloride (PF) 0.9% PF flush 3 mL (has no administration in time range)   sodium chloride (PF) 0.9% PF flush 3 mL (3 mLs Intracatheter Not Given 8/10/20 0548)   naloxone (NARCAN) injection 0.1-0.4 mg (has no administration in time range)   Patient is already receiving anticoagulation with heparin, enoxaparin (LOVENOX), warfarin (COUMADIN)  or other anticoagulant medication (has no administration in time range)   magnesium sulfate 4 g in 100 mL sterile water (premade) (has no administration in time range)   potassium chloride ER (KLOR-CON M) CR tablet 20-40 mEq (20 mEq Oral Given 8/9/20 0903)   potassium chloride 10 mEq in 100 mL sterile water intermittent infusion (premix) (has no administration in time range)   acetaminophen (TYLENOL) tablet 650 mg (650 mg Oral Given 8/8/20 2041)   magnesium hydroxide (MILK OF MAGNESIA) suspension 30 mL (has no  administration in time range)   senna-docusate (SENOKOT-S/PERICOLACE) 8.6-50 MG per tablet 1 tablet (has no administration in time range)     Or   senna-docusate (SENOKOT-S/PERICOLACE) 8.6-50 MG per tablet 2 tablet (has no administration in time range)   ondansetron (ZOFRAN-ODT) ODT tab 4 mg (has no administration in time range)     Or   ondansetron (ZOFRAN) injection 4 mg (has no administration in time range)   prochlorperazine (COMPAZINE) injection 5 mg (has no administration in time range)     Or   prochlorperazine (COMPAZINE) tablet 5 mg (has no administration in time range)     Or   prochlorperazine (COMPAZINE) Suppository 12.5 mg (has no administration in time range)   alum & mag hydroxide-simethicone (MAALOX  ES) suspension 30 mL (has no administration in time range)   piperacillin-tazobactam (ZOSYN) infusion 3.375 g (3.375 g Intravenous New Bag 8/10/20 0536)   ibuprofen (ADVIL/MOTRIN) tablet 200 mg (200 mg Oral Given 8/9/20 1446)   imatinib (GLEEVEC) tablet TABS 400 mg (400 mg Oral Given 8/9/20 0904)   azithromycin (ZITHROMAX) 500 mg in  mL (500 mg Intravenous Given 8/9/20 0903)   guaiFENesin (MUCINEX) 12 hr tablet 600 mg (600 mg Oral Given 8/9/20 2111)   guaiFENesin-dextromethorphan (ROBITUSSIN DM) 100-10 MG/5ML syrup 5 mL (5 mLs Oral Given 8/10/20 0209)   0.9% sodium chloride BOLUS (0 mLs Intravenous Stopped 8/7/20 0648)   ondansetron (ZOFRAN) injection 8 mg (8 mg Intravenous Given 8/7/20 0543)   famotidine (PEPCID) injection 20 mg (20 mg Intravenous Given 8/7/20 0555)   0.9% sodium chloride BOLUS (0 mLs Intravenous Stopped 8/7/20 0810)   iodixanol (VISIPAQUE 320) injection 100 mL (89 mLs Intravenous Given 8/7/20 0741)   iodixanol (VISIPAQUE 320) injection 30 mL (30 mLs Intravenous Given 8/7/20 1134)   iodixanol (VISIPAQUE 320) injection 100 mL (100 mLs Intravenous Given 8/7/20 1229)   0.9% sodium chloride BOLUS (1,000 mLs Intravenous New Bag 8/7/20 1527)   iodixanol (VISIPAQUE 320) injection 100 mL  (100 mLs Intravenous Given 8/8/20 0970)   0.9% sodium chloride BOLUS (1,000 mLs Intravenous New Bag 8/9/20 1446)       Assessments & Plan (with Medical Decision Making)     I have reviewed the nursing notes.    BP 79/49 in ED and the patient is given NS IV with improvement.    EKG was obtained. There is no evidence of acute ischemia. Troponin was negative.    Lab tests and X-rays were reviewed as above.     0700 - The patient is checked out to DR Carcamo at the routine change of shifts with CTA Chest pending.     Current Discharge Medication List          Final diagnoses:   Syncope, unspecified syncope type   Volume depletion   Hypotension due to hypovolemia       8/7/2020   [facility]    Care patient turned over myself at change of shift.  Due to her elevated d-dimer a CT for PE had been ordered.  Unfortunate when she went over her peripheral IV infiltrated and they did not get the dye in.  They therefore sent her back and another IV was attempted.  This was very difficult and nurse anesthetist was called over.  It took him about an hour before he got an external jugular placed.  Again, unfortunately, when she went over to have her scan this IV also infiltrated.  Because of this radiologist was unable to be definitive regarding pulmonary emboli.  He does however have fluid collections that could reflect pneumonia.  She did have the syncopal episodes and when she woke up she was coughing and so aspiration pneumonia is certainly good possibility here.  Lactic acid was quite elevated and really did not come down after couple liters of fluids and she is really feeling quite poorly.  Therefore spoke with Dr. velazquez, hospitalist has accepted her for admission.  Please see his documentation for more details.  They are going to cover her for possible pulmonary emboli until they are able to get a more definitive study.  She is stable at this time.  She has been given some IV Zosyn.     Prieto Carcamo,  MD  08/07/20 1524       Prieto Carcamo MD  08/10/20 0704

## 2020-08-07 NOTE — ED TRIAGE NOTES
Patient began having n/v and feeling of heart burn.  She didn't feel like lying down so she was sitting in her chair and then started vomiting around 0100.   states she had LOC for approximately 30 sec.  On arrival of EMS she was A/O and pale, BP was 84/55, O2 91% on room air.  Her blood pressure before arrival at [facility] was 93/45.  On arrival she is A/O, very pale, O2 on room air 88% on RA, patient answering questions appropriately and denies chest pain, SOB, cough or being around anyone sick.  She has an IV established by EMS and O2 on.  Hypotensive on arrival.

## 2020-08-07 NOTE — ED NOTES
Patient still has a little nausea but is starting to get relief from meds. REBEKAH Javier at bedside attempting to obtain 18g IV.

## 2020-08-07 NOTE — PROGRESS NOTES
"[facility] [facility] ADMISSION NOTE    Patient admitted to room 333 at approximately 0415 via cart from emergency room. Patient was accompanied by spouse.     Verbal SBAR report received from NABEEL Machado prior to patient arrival.     Patient ambulated to bed with stand-by assist. Patient alert and oriented X 3. Pain is controlled without any medications. 0-10 Pain Scale: 0. Admission vital signs: Blood pressure 117/57, pulse 104, temperature 98.8  F (37.1  C), temperature source Oral, resp. rate 12, height 1.676 m (5' 6\"), weight 82.1 kg (181 lb), SpO2 93 %. Patient was oriented to plan of care, call light, bed controls, tv, telephone, bathroom and visiting hours.     Risk Assessment    The following safety risks were identified during admission: fall. Yellow risk band applied: NO.     Skin Initial Assessment    This writer admitted this patient and completed a full skin assessment and Abraham score in the Adult PCS flowsheet. Appropriate interventions initiated as needed.     Secondary skin check completed by NABEEL COMER.         Education    Patient has a [place] to Observation order: No  Observation education completed and documented: No      Lilian Bronson RN    "

## 2020-08-07 NOTE — PROGRESS NOTES
Incentive Spirometry education completed.  Pt goal 2200 mls.  Pt achieved 750 mls.  Pt instructed to perform 10/hr while awake with at least one deep breath and cough per hour until able to perform baseline activity.  RT will follow and re-assess as need.      RT Inna on 8/7/2020 at 6:42 PM

## 2020-08-07 NOTE — ED NOTES
Patient back from CT.  Informed from [facility] that IV has infiltrated contrast dye.  She recommends ice pack.  IV pulled from right upper arm (outlined from rad tech) and ice pack applied.  CRNA called to restart an IV.

## 2020-08-08 ENCOUNTER — ANESTHESIA (OUTPATIENT)
Dept: MEDSURG UNIT | Facility: OTHER | Age: 65
DRG: 178 | End: 2020-08-08
Payer: MEDICARE

## 2020-08-08 ENCOUNTER — ANESTHESIA EVENT (OUTPATIENT)
Dept: MEDSURG UNIT | Facility: OTHER | Age: 65
DRG: 178 | End: 2020-08-08
Payer: MEDICARE

## 2020-08-08 ENCOUNTER — APPOINTMENT (OUTPATIENT)
Dept: CT IMAGING | Facility: OTHER | Age: 65
DRG: 178 | End: 2020-08-08
Attending: INTERNAL MEDICINE
Payer: MEDICARE

## 2020-08-08 LAB
ANION GAP SERPL CALCULATED.3IONS-SCNC: 5 MMOL/L (ref 3–14)
BUN SERPL-MCNC: 12 MG/DL (ref 7–25)
CALCIUM SERPL-MCNC: 7.8 MG/DL (ref 8.6–10.3)
CHLORIDE SERPL-SCNC: 113 MMOL/L (ref 98–107)
CO2 SERPL-SCNC: 24 MMOL/L (ref 21–31)
CREAT SERPL-MCNC: 0.76 MG/DL (ref 0.6–1.2)
ERYTHROCYTE [DISTWIDTH] IN BLOOD BY AUTOMATED COUNT: 14.6 % (ref 10–15)
GFR SERPL CREATININE-BSD FRML MDRD: 76 ML/MIN/{1.73_M2}
GLUCOSE SERPL-MCNC: 107 MG/DL (ref 70–105)
HCT VFR BLD AUTO: 29.6 % (ref 35–47)
HGB BLD-MCNC: 10.1 G/DL (ref 11.7–15.7)
LACTATE BLD-SCNC: 1 MMOL/L (ref 0.7–2)
MAGNESIUM SERPL-MCNC: 1.9 MG/DL (ref 1.9–2.7)
MCH RBC QN AUTO: 35.2 PG (ref 26.5–33)
MCHC RBC AUTO-ENTMCNC: 34.1 G/DL (ref 31.5–36.5)
MCV RBC AUTO: 103 FL (ref 78–100)
PLATELET # BLD AUTO: 146 10E9/L (ref 150–450)
POTASSIUM SERPL-SCNC: 3.5 MMOL/L (ref 3.5–5.1)
RBC # BLD AUTO: 2.87 10E12/L (ref 3.8–5.2)
SODIUM SERPL-SCNC: 142 MMOL/L (ref 134–144)
TROPONIN I SERPL-MCNC: 12.7 PG/ML
WBC # BLD AUTO: 9.4 10E9/L (ref 4–11)

## 2020-08-08 PROCEDURE — 36410 VNPNXR 3YR/> PHY/QHP DX/THER: CPT | Performed by: NURSE ANESTHETIST, CERTIFIED REGISTERED

## 2020-08-08 PROCEDURE — 94640 AIRWAY INHALATION TREATMENT: CPT | Mod: 76

## 2020-08-08 PROCEDURE — 99232 SBSQ HOSP IP/OBS MODERATE 35: CPT | Performed by: INTERNAL MEDICINE

## 2020-08-08 PROCEDURE — 25000128 H RX IP 250 OP 636: Performed by: INTERNAL MEDICINE

## 2020-08-08 PROCEDURE — 40000275 ZZH STATISTIC RCP TIME EA 10 MIN

## 2020-08-08 PROCEDURE — 94640 AIRWAY INHALATION TREATMENT: CPT

## 2020-08-08 PROCEDURE — 80048 BASIC METABOLIC PNL TOTAL CA: CPT | Performed by: INTERNAL MEDICINE

## 2020-08-08 PROCEDURE — 12000000 ZZH R&B MED SURG/OB

## 2020-08-08 PROCEDURE — 25000125 ZZHC RX 250: Performed by: NURSE ANESTHETIST, CERTIFIED REGISTERED

## 2020-08-08 PROCEDURE — 84484 ASSAY OF TROPONIN QUANT: CPT | Performed by: INTERNAL MEDICINE

## 2020-08-08 PROCEDURE — 85027 COMPLETE CBC AUTOMATED: CPT | Performed by: INTERNAL MEDICINE

## 2020-08-08 PROCEDURE — 25800030 ZZH RX IP 258 OP 636: Performed by: INTERNAL MEDICINE

## 2020-08-08 PROCEDURE — 25000125 ZZHC RX 250: Performed by: INTERNAL MEDICINE

## 2020-08-08 PROCEDURE — 25500064 ZZH RX 255 OP 636: Performed by: INTERNAL MEDICINE

## 2020-08-08 PROCEDURE — 25000132 ZZH RX MED GY IP 250 OP 250 PS 637: Mod: GY | Performed by: INTERNAL MEDICINE

## 2020-08-08 PROCEDURE — 71275 CT ANGIOGRAPHY CHEST: CPT

## 2020-08-08 PROCEDURE — 83605 ASSAY OF LACTIC ACID: CPT | Performed by: INTERNAL MEDICINE

## 2020-08-08 PROCEDURE — 83735 ASSAY OF MAGNESIUM: CPT | Performed by: INTERNAL MEDICINE

## 2020-08-08 PROCEDURE — 36415 COLL VENOUS BLD VENIPUNCTURE: CPT | Performed by: INTERNAL MEDICINE

## 2020-08-08 RX ORDER — LIDOCAINE HYDROCHLORIDE 10 MG/ML
INJECTION, SOLUTION EPIDURAL; INFILTRATION; INTRACAUDAL; PERINEURAL PRN
Status: DISCONTINUED | OUTPATIENT
Start: 2020-08-08 | End: 2020-08-08

## 2020-08-08 RX ORDER — IODIXANOL 320 MG/ML
100 INJECTION, SOLUTION INTRAVASCULAR ONCE
Status: COMPLETED | OUTPATIENT
Start: 2020-08-08 | End: 2020-08-08

## 2020-08-08 RX ADMIN — TAZOBACTAM SODIUM AND PIPERACILLIN SODIUM 3.38 G: 375; 3 INJECTION, SOLUTION INTRAVENOUS at 12:00

## 2020-08-08 RX ADMIN — IODIXANOL 100 ML: 320 INJECTION, SOLUTION INTRAVASCULAR at 09:27

## 2020-08-08 RX ADMIN — IBUPROFEN 200 MG: 200 TABLET, FILM COATED ORAL at 23:30

## 2020-08-08 RX ADMIN — IPRATROPIUM BROMIDE AND ALBUTEROL SULFATE 3 ML: .5; 3 SOLUTION RESPIRATORY (INHALATION) at 14:27

## 2020-08-08 RX ADMIN — IPRATROPIUM BROMIDE AND ALBUTEROL SULFATE 3 ML: .5; 3 SOLUTION RESPIRATORY (INHALATION) at 19:42

## 2020-08-08 RX ADMIN — IPRATROPIUM BROMIDE AND ALBUTEROL SULFATE 3 ML: .5; 3 SOLUTION RESPIRATORY (INHALATION) at 06:23

## 2020-08-08 RX ADMIN — TAZOBACTAM SODIUM AND PIPERACILLIN SODIUM 3.38 G: 375; 3 INJECTION, SOLUTION INTRAVENOUS at 06:34

## 2020-08-08 RX ADMIN — LIDOCAINE HYDROCHLORIDE 0.1 ML: 10 INJECTION, SOLUTION EPIDURAL; INFILTRATION; INTRACAUDAL; PERINEURAL at 08:26

## 2020-08-08 RX ADMIN — IPRATROPIUM BROMIDE AND ALBUTEROL SULFATE 3 ML: .5; 3 SOLUTION RESPIRATORY (INHALATION) at 10:58

## 2020-08-08 RX ADMIN — ACETAMINOPHEN 650 MG: 325 TABLET ORAL at 20:41

## 2020-08-08 RX ADMIN — ACETAMINOPHEN 650 MG: 325 TABLET ORAL at 14:58

## 2020-08-08 RX ADMIN — TAZOBACTAM SODIUM AND PIPERACILLIN SODIUM 3.38 G: 375; 3 INJECTION, SOLUTION INTRAVENOUS at 23:25

## 2020-08-08 RX ADMIN — SODIUM CHLORIDE: 9 INJECTION, SOLUTION INTRAVENOUS at 00:49

## 2020-08-08 RX ADMIN — ENOXAPARIN SODIUM 80 MG: 80 INJECTION SUBCUTANEOUS at 06:34

## 2020-08-08 RX ADMIN — TAZOBACTAM SODIUM AND PIPERACILLIN SODIUM 3.38 G: 375; 3 INJECTION, SOLUTION INTRAVENOUS at 17:58

## 2020-08-08 ASSESSMENT — MIFFLIN-ST. JEOR: SCORE: 1347.82

## 2020-08-08 ASSESSMENT — ACTIVITIES OF DAILY LIVING (ADL)
ADLS_ACUITY_SCORE: 13

## 2020-08-08 NOTE — PLAN OF CARE
"Pt has low grade temp, HR tachy , B/P soft. /51   Pulse 104   Temp 99.5  F (37.5  C) (Tympanic)   Resp 20   Ht 1.676 m (5' 6\")   Wt 79.1 kg (174 lb 6.4 oz)   SpO2 94%   BMI 28.15 kg/m    Lung sounds diminished throughout, reports some shortness of breath, O2 saturations 94% on acute 1 LPM NC. Bowel sounds active, denies any nausea, poor appetite. Pain reported on left side of neck, ice packs applied. Urine cloudy yellow, sediment present, Po fluids encouraged, will continue to monitor. Rocky Bryant RN on 8/8/2020 at 5:14 AM      "

## 2020-08-08 NOTE — PLAN OF CARE
VSS, had only very low grade at 99.3 this morning. Is on RA now at 93-94%. She denies pain. Feels great improvement today. Is more lively, more talkative. Sitting upright in chair. Had some breakfast and is taking in oral fluids adequately. Saline locked IV after zosyn infused. Had shower this AM. Tele remains on. Ambulating without difficulty.  at bedside.     Lilian Bronson RN on 8/8/2020 at 1:46 PM

## 2020-08-08 NOTE — PHARMACY
"Pharmacy: Patient Own Medication Identification    The requirements of Policy 13-03 from the Pharmacy Policy Manual have been met and the patient will be allowed to use their own supply of: Imatinib 400 mg tabs.    Tam Israel RPH has verified the name, dose, route, and directions for each medication. The integrity has been assessed and deemed safe.     The product(s) have been labeled as being inspected by a pharmacist and the medication has been placed in the patient-specific bin in the medication room.    Nursing will remove medication at the appropriately scheduled times and document the administration on the MAR with the designation of \"patient own\".    Nursing to return medication back to patient at time of discharge.     Tam Israel RPH ....................  8/8/2020   10:18 AM    "

## 2020-08-08 NOTE — PROGRESS NOTES
SAFETY CHECKLIST  ID Bands and Risk clasps correct and in place (DNR, Fall risk, Allergy, Latex, Limb):  Yes  All Lines Reconciled and labeled correctly: Yes  Whiteboard updated:Yes  Environmental interventions (bed/chair alarm on, call light, side rails, restraints, sitter....): Yes  Verify Tele #: 3

## 2020-08-08 NOTE — PROVIDER NOTIFICATION
08/08/20 1458   Vitals   Temp 101  F (38.3  C)   Temp src Tympanic   Pulse 110   Heart Rate/Source Monitor   /45   Resp 16     Tachycardic, fever 101 (tylenol administered) and 02 1L replaced for 02 sat of 87% on RA. She is now 93% on 1L. Does report some SOB especially with use of the IS.     Lilian Bronson RN on 8/8/2020 at 3:01 PM

## 2020-08-08 NOTE — PLAN OF CARE
Pt A & O x 4, temp 100.4 at beginning of shift, PRN Motrin given, temp now 99.4, HR tachy , Orthostatic b/p's  negative lying 112/47 P 111, sitting 119/41 P 115, standing 112/56 P 126. Lung sounds diminished with crackles throughout, some sob present, O2 saturations 92% on acute 1 LPM NC. Pt reported 5/10 headache that resolved with Motrin, will continue to monitor. Rocky Bryant RN on 8/7/2020 at 11:51 PM

## 2020-08-08 NOTE — PROGRESS NOTES
IV Contrast- Discharge Instructions After Your CT Scan      The IV contrast you received today will be filtered from your bloodstream by your kidneys during the next 24 hours and pass from the body in urine.  You will not be aware of this process and your urine will not change in color.  To help this process you should drink at least 4 additional glasses of water or juice today.  This reduces stress on your kidneys.    Most contrast reactions are immediate.  Should you develop symptoms of concern after discharge, contact the department at the number below.  After hours you should contact your personal physician.  If you develop breathing distress or wheezing, call 911.      Pt had contrast yesterday. G[facility] protocol to wait 48 hours. Confirmed with Dr. Yusuf, he would like study repeated today. Sub optimal scan bc of iv infiltrate. Will Repeat CT Pe study today. Visipaque given.  Will monitor GFR   Karyn Wise on 8/8/2020 at 8:57 AM

## 2020-08-08 NOTE — PROGRESS NOTES
Verbal order for Motrin 200 mg, every 6 hours PRN for fever. Rocky Bryant RN on 8/8/2020 at 12:38 AM

## 2020-08-08 NOTE — PROGRESS NOTES
[facility]    Hospitalist Progress Note      Assessment & Plan   Alison Norwood is a 65 year old female who was admitted on 8/7/2020.     Pneumonia    Assessment: improved. Now normalized lactate, bilateral infiltrates, supplemental oxygen now resolved, no recent hospitalizations.  Given her syncope and vomiting high suspicion for aspiration.    Plan:   -IV Zosyn day 2  -Scheduled and as needed nebulizer's  - follow-up blood and urine cultures  -COVID negative  -Obtain sputum culture if able  -Incentive spirometry       D-dimer, elevated    Assessment: Likely an acute phase reactant in the setting of active infection, lower extremity ultrasound negative for DVT, CT PE protocol negative, right EJ infiltration with contrast improved.     Plan:   -discontinue Lovenox   - ice pack to neck prn       CHANG (acute kidney injury) (H)    Assessment: resolved with ivf and likely prerenal.    Plan:   -monitor renal function daily     Syncope  Assessment: Possibly vasovagal given her prodrome. Orthostatics negative in ER.   Plan:   -Telemetry   - troponin's negative       Chronic myeloid leukemia in remission (H)    Assessment: Chronic    Plan:   - resume home Gleevec      FEN: regular diet, discontinue normal saline  PPX: early ambulation    Code Status: Full Code    Taras Yusuf    Interval History   Overnight no acute events and one low-grade temp, breathing feels significantly improved today, no productive cough, no wheezing, no further aspiration symptoms.  Energy and appetite have improved, she is been up and walking, her right neck feels far less uncomfortable, no increased lower extremity edema chest pain palpitations presyncopal or syncopal  symptoms and no other new complaints.    -Data reviewed today: I reviewed all new labs and imaging results over the last 24 hours. I personally reviewed no images or EKG's today.    Physical Exam   Temp: 99.3  F (37.4  C) Temp src: Tympanic BP: 100/40 Pulse: 104  Heart Rate: 101 Resp: 18 SpO2: 93 % O2 Device: None (Room air) Oxygen Delivery: 1 LPM  Vitals:    08/07/20 1600 08/07/20 1745 08/08/20 0425   Weight: 82.1 kg (181 lb) 79.7 kg (175 lb 12.8 oz) 79.1 kg (174 lb 6.4 oz)     Vital Signs with Ranges  Temp:  [98.8  F (37.1  C)-100.4  F (38  C)] 99.3  F (37.4  C)  Pulse:  [] 104  Heart Rate:  [] 101  Resp:  [9-20] 18  BP: ()/(40-73) 100/40  SpO2:  [86 %-99 %] 93 %  I/O last 3 completed shifts:  In: 5510 [P.O.:120; I.V.:5390]  Out: 1100 [Urine:1100]    Exam:  GENERAL: Talkative, in no apparent distress.  NECK: Supple, jugular venous distension not present.  Right neck with resolving asymmetric nonerythematous edema which has been outlined with marker at site of EJ contrast extravasation.   CARDIOVASCULAR: regular rate and rhythm, no murmurs, rubs, or gallops. Normal S1/S2. No lower extremity edema.   RESPIRATORY: Scant bibasilar rhonchi without wheezes or crackles, diffuse air movement in all fields, no accessory muscle use or evidence of respiratory distress.    GI: soft, non-tender, non-distended, normoactive bowel sounds.  MUSCULOSKELETAL: warm and well perfused, 2+ dorsalis pedis pulses.    SKIN: no pallor, jaundice or rashes.  NEUROLOGY: AAOx3, follows commands, speech and language without focal deficits.     Medications     - MEDICATION INSTRUCTIONS -         imatinib  400 mg Oral Daily     ipratropium - albuterol 0.5 mg/2.5 mg/3 mL  3 mL Nebulization 4x daily     piperacillin-tazobactam  3.375 g Intravenous Q6H     sodium chloride (PF)  3 mL Intracatheter Q8H       Data   Recent Labs   Lab 08/08/20  0620 08/07/20  0600   WBC 9.4 8.2   HGB 10.1* 11.8   * 106*   * 149*   INR  --  0.95    137   POTASSIUM 3.5 3.8   CHLORIDE 113* 106   CO2 24 23   BUN 12 18   CR 0.76 1.24*   ANIONGAP 5 8   MOHINDER 7.8* 8.8   * 157*   ALBUMIN  --  3.9   PROTTOTAL  --  6.0*   BILITOTAL  --  0.3   ALKPHOS  --  55   ALT  --  15   AST  --  21   LIPASE   --  32       Recent Results (from the past 24 hour(s))   CT Chest w Contrast    Narrative    PROCEDURE: CT CHEST W CONTRAST 8/7/2020 12:30 PM    HISTORY: PE suspected, intermediate prob, positive D-dimer    COMPARISONS: None.    Meds/Dose Given: Visipaque 320 100 mL    TECHNIQUE: CT angiogram of the chest was performed with IV contrast  sagittal coronal reconstructions were obtained.    FINDINGS: There is contrast extravasation in the right side of the  neck. No opacification of the pulmonary arteries was obtained.  Extensive alveolar opacities are noted in both lungs most severe in  the lower lobes bilaterally with air bronchograms. Significant opacity  is also present in the right upper lobe and mildly opacity is seen in  the left upper lobe. These opacities are suspicious for pneumonia. The  hilar and mediastinal lymph nodes are normal in caliber. Axillary and  supraclavicular lymph nodes appear normal. The upper portion of the  liver and spleen appear normal. Degenerative changes are present in  the thoracic spine.         Impression    IMPRESSION: Extensive bilateral alveolar opacities in both lungs most  consistent with pneumonia.    OLESYA FRIEND MD   US Lower Extremity Venous Duplex Bilateral    Narrative    Exam:US LOWER EXTREMITY VENOUS DUPLEX BILATERAL    History:  65 years Female   : With shortness of breath and elevated  d-dimer. Patient failed CT angiography.    Comparisons: None    Technique: Venous duplex ultrasonography of the bilateral lower  extremities was performed.     Findings: The common femoral veins, superficial femoral veins and  popliteal veins are fully compressible with spontaneous and  augmentable venous flow.           Impression    Impression: No evidence of deep venous thrombosis within the lower  extremities.    ROMAIN VELOZ MD   CT Chest Pulmonary Embolism w Contrast    Narrative    PROCEDURE: CT CHEST PULMONARY EMBOLISM W CONTRAST 8/8/2020 9:28 AM    HISTORY: PE  suspected, high pretest prob    COMPARISONS: August 7, 2020    Meds/Dose Given: visipaque 320 100 ml    TECHNIQUE: CT angiogram the chest was performed with sagittal and  coronal MIPS reconstructions    FINDINGS: CT angiogram reveals no pulmonary emboli are seen. The  thoracic aorta appears normal.    There are bilateral pleural effusions larger on the right than the  left the pleural effusions represent a change from previous  examination on August 7. There are extensive alveolar opacities in  both lungs which are stable from previous examination. The hilar and  mediastinal lymph nodes are normal in caliber. The axillary and  supraclavicular lymph nodes appear normal. The upper portion of the  liver spleen and pancreas appear normal.         Impression    IMPRESSION: No pulmonary emboli.     Interval development of bilateral pleural effusions.     Widespread interstitial and alveolar opacities in both lungs stable  and unchanged from August 7, 2020    OLESYA FRIEND MD

## 2020-08-09 LAB
ANION GAP SERPL CALCULATED.3IONS-SCNC: 8 MMOL/L (ref 3–14)
BUN SERPL-MCNC: 8 MG/DL (ref 7–25)
CALCIUM SERPL-MCNC: 8 MG/DL (ref 8.6–10.3)
CHLORIDE SERPL-SCNC: 111 MMOL/L (ref 98–107)
CO2 SERPL-SCNC: 24 MMOL/L (ref 21–31)
CREAT SERPL-MCNC: 0.76 MG/DL (ref 0.6–1.2)
ERYTHROCYTE [DISTWIDTH] IN BLOOD BY AUTOMATED COUNT: 14.5 % (ref 10–15)
GFR SERPL CREATININE-BSD FRML MDRD: 76 ML/MIN/{1.73_M2}
GLUCOSE SERPL-MCNC: 110 MG/DL (ref 70–105)
HCT VFR BLD AUTO: 28.1 % (ref 35–47)
HGB BLD-MCNC: 9.2 G/DL (ref 11.7–15.7)
INTERPRETATION ECG - MUSE: NORMAL
L PNEUMO1 AG UR QL IA: NORMAL
LACTATE BLD-SCNC: 2.4 MMOL/L (ref 0.7–2)
MAGNESIUM SERPL-MCNC: 2 MG/DL (ref 1.9–2.7)
MCH RBC QN AUTO: 33.7 PG (ref 26.5–33)
MCHC RBC AUTO-ENTMCNC: 32.7 G/DL (ref 31.5–36.5)
MCV RBC AUTO: 103 FL (ref 78–100)
PLATELET # BLD AUTO: 152 10E9/L (ref 150–450)
POTASSIUM SERPL-SCNC: 3.3 MMOL/L (ref 3.5–5.1)
PROCALCITONIN SERPL-MCNC: 0.66 NG/ML
RBC # BLD AUTO: 2.73 10E12/L (ref 3.8–5.2)
S PNEUM AG SPEC QL: NORMAL
SODIUM SERPL-SCNC: 143 MMOL/L (ref 134–144)
SPECIMEN SOURCE: NORMAL
SPECIMEN SOURCE: NORMAL
WBC # BLD AUTO: 6.9 10E9/L (ref 4–11)

## 2020-08-09 PROCEDURE — 36415 COLL VENOUS BLD VENIPUNCTURE: CPT | Performed by: INTERNAL MEDICINE

## 2020-08-09 PROCEDURE — 87899 AGENT NOS ASSAY W/OPTIC: CPT | Performed by: INTERNAL MEDICINE

## 2020-08-09 PROCEDURE — 40000275 ZZH STATISTIC RCP TIME EA 10 MIN

## 2020-08-09 PROCEDURE — 25000125 ZZHC RX 250: Performed by: INTERNAL MEDICINE

## 2020-08-09 PROCEDURE — 94640 AIRWAY INHALATION TREATMENT: CPT | Mod: 76

## 2020-08-09 PROCEDURE — 94799 UNLISTED PULMONARY SVC/PX: CPT

## 2020-08-09 PROCEDURE — 83605 ASSAY OF LACTIC ACID: CPT | Performed by: INTERNAL MEDICINE

## 2020-08-09 PROCEDURE — 25000132 ZZH RX MED GY IP 250 OP 250 PS 637: Mod: GY | Performed by: INTERNAL MEDICINE

## 2020-08-09 PROCEDURE — 94640 AIRWAY INHALATION TREATMENT: CPT

## 2020-08-09 PROCEDURE — 85027 COMPLETE CBC AUTOMATED: CPT | Performed by: INTERNAL MEDICINE

## 2020-08-09 PROCEDURE — 84145 PROCALCITONIN (PCT): CPT | Performed by: INTERNAL MEDICINE

## 2020-08-09 PROCEDURE — 25000128 H RX IP 250 OP 636: Performed by: INTERNAL MEDICINE

## 2020-08-09 PROCEDURE — 83735 ASSAY OF MAGNESIUM: CPT | Performed by: INTERNAL MEDICINE

## 2020-08-09 PROCEDURE — 25800030 ZZH RX IP 258 OP 636: Performed by: INTERNAL MEDICINE

## 2020-08-09 PROCEDURE — 12000000 ZZH R&B MED SURG/OB

## 2020-08-09 PROCEDURE — 99232 SBSQ HOSP IP/OBS MODERATE 35: CPT | Performed by: INTERNAL MEDICINE

## 2020-08-09 PROCEDURE — 80048 BASIC METABOLIC PNL TOTAL CA: CPT | Performed by: INTERNAL MEDICINE

## 2020-08-09 RX ORDER — GUAIFENESIN/DEXTROMETHORPHAN 100-10MG/5
5 SYRUP ORAL EVERY 4 HOURS PRN
Status: DISCONTINUED | OUTPATIENT
Start: 2020-08-09 | End: 2020-08-10

## 2020-08-09 RX ORDER — AZITHROMYCIN 500 MG/5ML
500 INJECTION, POWDER, LYOPHILIZED, FOR SOLUTION INTRAVENOUS EVERY 24 HOURS
Status: DISCONTINUED | OUTPATIENT
Start: 2020-08-09 | End: 2020-08-10

## 2020-08-09 RX ORDER — GUAIFENESIN 600 MG/1
600 TABLET, EXTENDED RELEASE ORAL 2 TIMES DAILY
Status: DISCONTINUED | OUTPATIENT
Start: 2020-08-09 | End: 2020-08-12 | Stop reason: HOSPADM

## 2020-08-09 RX ADMIN — GUAIFENESIN 600 MG: 600 TABLET, EXTENDED RELEASE ORAL at 21:11

## 2020-08-09 RX ADMIN — DEXTROMETHORPHAN HBR, GUAIFENESIN 5 ML: 20; 200 SOLUTION ORAL at 21:11

## 2020-08-09 RX ADMIN — POTASSIUM CHLORIDE 20 MEQ: 1500 TABLET, EXTENDED RELEASE ORAL at 09:03

## 2020-08-09 RX ADMIN — IPRATROPIUM BROMIDE AND ALBUTEROL SULFATE 3 ML: .5; 3 SOLUTION RESPIRATORY (INHALATION) at 06:12

## 2020-08-09 RX ADMIN — ALBUTEROL SULFATE 2.5 MG: 2.5 SOLUTION RESPIRATORY (INHALATION) at 13:32

## 2020-08-09 RX ADMIN — IPRATROPIUM BROMIDE AND ALBUTEROL SULFATE 3 ML: .5; 3 SOLUTION RESPIRATORY (INHALATION) at 10:50

## 2020-08-09 RX ADMIN — TAZOBACTAM SODIUM AND PIPERACILLIN SODIUM 3.38 G: 375; 3 INJECTION, SOLUTION INTRAVENOUS at 18:21

## 2020-08-09 RX ADMIN — IPRATROPIUM BROMIDE AND ALBUTEROL SULFATE 3 ML: .5; 3 SOLUTION RESPIRATORY (INHALATION) at 19:57

## 2020-08-09 RX ADMIN — SODIUM CHLORIDE 1000 ML: 9 INJECTION, SOLUTION INTRAVENOUS at 14:46

## 2020-08-09 RX ADMIN — AZITHROMYCIN 500 MG: 500 INJECTION, POWDER, LYOPHILIZED, FOR SOLUTION INTRAVENOUS at 09:03

## 2020-08-09 RX ADMIN — TAZOBACTAM SODIUM AND PIPERACILLIN SODIUM 3.38 G: 375; 3 INJECTION, SOLUTION INTRAVENOUS at 05:51

## 2020-08-09 RX ADMIN — POTASSIUM CHLORIDE 40 MEQ: 1500 TABLET, EXTENDED RELEASE ORAL at 05:53

## 2020-08-09 RX ADMIN — TAZOBACTAM SODIUM AND PIPERACILLIN SODIUM 3.38 G: 375; 3 INJECTION, SOLUTION INTRAVENOUS at 23:20

## 2020-08-09 RX ADMIN — TAZOBACTAM SODIUM AND PIPERACILLIN SODIUM 3.38 G: 375; 3 INJECTION, SOLUTION INTRAVENOUS at 12:15

## 2020-08-09 RX ADMIN — IBUPROFEN 200 MG: 200 TABLET, FILM COATED ORAL at 14:46

## 2020-08-09 RX ADMIN — IPRATROPIUM BROMIDE AND ALBUTEROL SULFATE 3 ML: .5; 3 SOLUTION RESPIRATORY (INHALATION) at 15:14

## 2020-08-09 ASSESSMENT — ACTIVITIES OF DAILY LIVING (ADL)
ADLS_ACUITY_SCORE: 13

## 2020-08-09 ASSESSMENT — MIFFLIN-ST. JEOR: SCORE: 1360.52

## 2020-08-09 NOTE — PROGRESS NOTES
MD will evaluate patient's telemetry after dinner. She is still tachycardic but 1L fluid bolus just finished infusing recently. Fever is breaking. She does not complain of increasing SOB but reports ups and downs overall throughout the course of the day. MD updated on the above. BP stable.     Lilian Bronson RN on 8/9/2020 at 4:25 PM

## 2020-08-09 NOTE — DISCHARGE SUMMARY
[facility]    Discharge Summary  Hospitalist    Date of Admission:  8/7/2020  Date of Discharge:  8/12/2020  Discharging Provider: Renzo Wilson  Date of Service (when I saw the patient): 08/12/20    Discharge Diagnoses   Principal Problem:    Pneumonia, community acquired  Active Problems:    Chronic myeloid leukemia in remission (H) (6/2/2011)    D-dimer, elevated (8/7/2020)    CHANG (acute kidney injury) (H) (8/7/2020)    History of Present Illness   Alison Norwood is a 65 year old female who presents with syncope and pneumonia.  Patient has been her normal state of health but started feeling poorly night prior to admit.  She felt nauseated like she was going to vomit.  In the early am prior to admit she again awoke with nausea and vomiting, was sitting in a chair after a bout of vomiting, felt lightheaded like she was going to faint, lowered herself to the ground, fainted and had full loss of consciousness for nearly 30 seconds, when she came to she had noticed she had vomited and that she was very short of breath with a new nonproductive cough.  She felt extremely weak, EMS was called, she had a blood pressure of 84/55 on initial eval, and was brought to the ER.     In the ER she received Zosyn, 1 L of IV fluids, lactate improved, had multiple issues with IV infiltration including a right EJ for CT PE protocol and she was subsequently admitted for further management.    Hospital Course   Alison Norwood was admitted on 8/7/2020.  The following problems were addressed during her hospitalization:    Pneumonia: She is initially started on Zosyn suspicion for aspiration related pneumonia as well as scheduled nebulizers, she did have a supplemental oxygen requirement, she underwent a repeat CT PE protocol negative for PE.  Night of 8/8 she did spike a fever to 101, azithromycin was added, but her oxygen requirement was weaned and she had resting saturations greater than 90% on room air at rest 24  hours prior to discharge.  Sputum cultures not able to be obtained, COVID was negative.  Given her ongoing clinical improvement she will complete a course of Augmentin, she will have routine post hospital follow-up with PCP to ensure improvement.       D-dimer, elevated: Initially noted in ER, underwent lower extremity ultrasounds negative for DVT, given her initial acute kidney injury she did undergo CT PE protocol, she had a right EJ placed given her difficult access which infiltrated with contrast during her initial PE protocol.  Swelling resolved nicely with no overlying erythema.  Day after admission with rehydration overnight renal function returned to normal, repeat PE protocol was negative.  She will continue with ice as needed to her right neck until swelling resolves.      Syncope: Possibly vasovagal versus orthostatic given her initial hypotension.  Orthostatics remained negative after ivf, telemetry was without evidence of arrhythmias and troponin was negative.          Chronic myeloid leukemia in remission (H): Chronic, has every 6-month follow-up in is maintained on chronic Gleevec.  She wants to establish care locally and referral has been placed.    Renzo Wilson M.D. 8/12/2020  9:10 AM  [phone]      Significant Results and Procedures   none    Pending Results   These results will be followed up by pcp  Unresulted Labs Ordered in the Past 30 Days of this Admission     Date and Time Order Name Status Description    8/7/2020 0645 Blood culture Preliminary     8/7/2020 0645 Blood culture Preliminary           Code Status   Full Code       Primary Care Physician   Physician No Ref-Primary    Physical Exam   Temp: 99.2  F (37.3  C) Temp src: Tympanic BP: 134/55   Heart Rate: 108 Resp: 20 SpO2: 92 % O2 Device: None (Room air) Oxygen Delivery: 1/2 LPM  Vitals:    08/10/20 0500 08/11/20 0332 08/12/20 0430   Weight: 79.1 kg (174 lb 6.4 oz) 78.3 kg (172 lb 9.6 oz) 77.9 kg (171 lb 12.8 oz)     Vital  Signs with Ranges  Temp:  [98.9  F (37.2  C)-101.3  F (38.5  C)] 99.2  F (37.3  C)  Heart Rate:  [103-119] 108  Resp:  [16-20] 20  BP: (112-154)/(53-62) 134/55  SpO2:  [90 %-93 %] 92 %  I/O last 3 completed shifts:  In: 450 [P.O.:380; I.V.:70]  Out: 2450 [Urine:2450]    GENERAL: Comfortable, no apparent distress.  CARDIOVASCULAR: regular rate and rhythm, no murmur. No lower extremity edema   RESPIRATORY: Clear to auscultation bilaterally, no wheezes or crackles.  GI: non-tender, non-distended, normal bowel sounds.   SKIN: warm periphery, no rashes      Discharge Disposition   Discharged to home  Condition at discharge: Stable    Consultations This Hospital Stay   None    Time Spent on this Encounter   I, Renzo Wilson MD, personally saw the patient today and spent greater than 30 minutes discharging this patient.    Discharge Orders      Oncology/Hematology Adult Referral      Reason for your hospital stay    Pneumonia     Follow-up and recommended labs and tests    Patient has follow up with Dr. Esquivel Aug 18th at 3:20.   You also have a Dr. Rosales (Oncology) appt on 9/17 at 1:00.     Activity    Your activity upon discharge: activity as tolerated     Discharge Instructions    Use your incentive spirometer for breathing exercises at home. Tylenol for fever. If worsening fever or shortness of breath, please come back in to be seen.     Full Code     Diet    Follow this diet upon discharge: Orders Placed This Encounter      Regular Diet Adult       Discharge Medications   Current Discharge Medication List      START taking these medications    Details   acetaminophen (TYLENOL) 325 MG tablet Take 2 tablets (650 mg) by mouth every 4 hours as needed for mild pain or fever  Qty:      Associated Diagnoses: Pneumonia due to infectious organism, unspecified laterality, unspecified part of lung      amoxicillin-clavulanate (AUGMENTIN) 875-125 MG tablet Take 1 tablet by mouth every 12 hours  Qty: 11 tablet, Refills: 0     Associated Diagnoses: Pneumonia due to infectious organism, unspecified laterality, unspecified part of lung      guaiFENesin (MUCINEX) 600 MG 12 hr tablet Take 1 tablet (600 mg) by mouth 2 times daily  Qty: 30 tablet, Refills: 0    Associated Diagnoses: Pneumonia due to infectious organism, unspecified laterality, unspecified part of lung      guaiFENesin-codeine (ROBITUSSIN AC) 100-10 MG/5ML solution Take 5 mLs by mouth every 4 hours as needed for cough  Qty: 180 mL, Refills: 0    Associated Diagnoses: Pneumonia due to infectious organism, unspecified laterality, unspecified part of lung         CONTINUE these medications which have NOT CHANGED    Details   imatinib (GLEEVEC) 400 MG TABS tablet Take 400 mg by mouth daily           Allergies   No Known Allergies  Data   Most Recent 3 CBC's:  Recent Labs   Lab Test 08/11/20  0532 08/10/20  0509 08/09/20  0455   WBC 4.4 6.2 6.9   HGB 10.1* 9.7* 9.2*   * 102* 103*    169 152      Most Recent 3 BMP's:  Recent Labs   Lab Test 08/12/20  0500 08/11/20  0532 08/10/20  0509 08/09/20  0455   NA  --  140 142 143   POTASSIUM 3.5 3.5 3.7 3.3*   CHLORIDE  --  106 110* 111*   CO2  --  27 25 24   BUN  --  8 5* 8   CR  --  0.71 0.71 0.76   ANIONGAP  --  7 7 8   MOHINDER  --  8.6 8.3* 8.0*   GLC  --  103 105 110*     Most Recent 2 LFT's:  Recent Labs   Lab Test 08/07/20  0600   AST 21   ALT 15   ALKPHOS 55   BILITOTAL 0.3     Most Recent INR's and Anticoagulation Dosing History:  Anticoagulation Dose History     Recent Dosing and Labs Latest Ref Rng & Units 8/7/2020    INR 0 - 1.3 0.95        Most Recent 3 Troponin's:No lab results found.  Most Recent Cholesterol Panel:No lab results found.  Most Recent 6 Bacteria Isolates From Any Culture (See EPIC Reports for Culture Details):  Recent Labs   Lab Test 08/07/20  1214 08/07/20  0700   CULT Canceled, Test credited  NO SAMPLE RECEIVED IN MICRO.   No growth after 5 days  No growth after 5 days     Most Recent TSH, T4 and  A1c Labs:No lab results found.  Results for orders placed or performed during the hospital encounter of 08/07/20   IR Port Check Right    Narrative    IR PORT CHECK RIGHT    HISTORY: 65 years Female yes  with recent placement of left-sided  external jugular catheter. The provider was uncertain of position of  the catheter, there was no hold back of blood from the lumen.    COMPARISON: None    TECHNIQUE: 30 cc of contrast was injected under fluoroscopic  observation. There is opacification of the external jugular or branch  leading to the external jugular. There was no extravasation of  contrast.    Findings and     Impression    impression: Intraluminal location of central line. The tip of the  central line is within the right external jugular vein or a smaller  side branch of the external jugular vein    ROMAIN VELOZ MD   CT Chest w Contrast    Narrative    PROCEDURE: CT CHEST W CONTRAST 8/7/2020 12:30 PM    HISTORY: PE suspected, intermediate prob, positive D-dimer    COMPARISONS: None.    Meds/Dose Given: Visipaque 320 100 mL    TECHNIQUE: CT angiogram of the chest was performed with IV contrast  sagittal coronal reconstructions were obtained.    FINDINGS: There is contrast extravasation in the right side of the  neck. No opacification of the pulmonary arteries was obtained.  Extensive alveolar opacities are noted in both lungs most severe in  the lower lobes bilaterally with air bronchograms. Significant opacity  is also present in the right upper lobe and mildly opacity is seen in  the left upper lobe. These opacities are suspicious for pneumonia. The  hilar and mediastinal lymph nodes are normal in caliber. Axillary and  supraclavicular lymph nodes appear normal. The upper portion of the  liver and spleen appear normal. Degenerative changes are present in  the thoracic spine.         Impression    IMPRESSION: Extensive bilateral alveolar opacities in both lungs most  consistent with pneumonia.    OLESYA  MD PHUC   US Lower Extremity Venous Duplex Bilateral    Narrative    Exam:US LOWER EXTREMITY VENOUS DUPLEX BILATERAL    History:  65 years Female   : With shortness of breath and elevated  d-dimer. Patient failed CT angiography.    Comparisons: None    Technique: Venous duplex ultrasonography of the bilateral lower  extremities was performed.     Findings: The common femoral veins, superficial femoral veins and  popliteal veins are fully compressible with spontaneous and  augmentable venous flow.           Impression    Impression: No evidence of deep venous thrombosis within the lower  extremities.    ROMAIN VELOZ MD   CT Chest Pulmonary Embolism w Contrast    Narrative    PROCEDURE: CT CHEST PULMONARY EMBOLISM W CONTRAST 8/8/2020 9:28 AM    HISTORY: PE suspected, high pretest prob    COMPARISONS: August 7, 2020    Meds/Dose Given: visipaque 320 100 ml    TECHNIQUE: CT angiogram the chest was performed with sagittal and  coronal MIPS reconstructions    FINDINGS: CT angiogram reveals no pulmonary emboli are seen. The  thoracic aorta appears normal.    There are bilateral pleural effusions larger on the right than the  left the pleural effusions represent a change from previous  examination on August 7. There are extensive alveolar opacities in  both lungs which are stable from previous examination. The hilar and  mediastinal lymph nodes are normal in caliber. The axillary and  supraclavicular lymph nodes appear normal. The upper portion of the  liver spleen and pancreas appear normal.         Impression    IMPRESSION: No pulmonary emboli.     Interval development of bilateral pleural effusions.     Widespread interstitial and alveolar opacities in both lungs stable  and unchanged from August 7, 2020    OLESYA FRIEND MD

## 2020-08-09 NOTE — PROGRESS NOTES
Dr. Yusuf updated that patient has been consistently tachycardic majority of morning (currently she is 106) but has often been 120 and up to 140s with activity. Also, replaced back on 1L 02 for 02 sat of low 80s.     Order placed for lactic acid stat.   MD will follow up with this.     Lilian Bronson RN on 8/9/2020 at 1:38 PM

## 2020-08-09 NOTE — PROVIDER NOTIFICATION
08/09/20 1438   Vital Signs   Temp 100.5  F (38.1  C)   Temp src Tympanic   Resp 16   Pulse 113   Pulse/Heart Rate Source Monitor   /55     MD aware of 2.4 lactic acid result. Still tachycardic. 92% on 1L 02 via NC and febrile again at 100.5 orally.     New order received for 1L fluid bolus and will notify MD if tachycardia does not resolve and he will redraw lactate after bolus.     Lilian Bronson RN on 8/9/2020 at 2:39 PM

## 2020-08-09 NOTE — PLAN OF CARE
Pt had low grade temps throughout shift 100.1-100.2, Prn tylenol and motrin rotated and now temp is 99.4,HR tachy , orthostatic b/ps negative, Lying 108/51 p 115, sitting 123/54 p 119, standing 126/42 P 126.  Lung sounds diminished throughout, shortness of breath with activity noted, O2 saturations 93% on acute 1 LPM NC. Bowel sounds active throughout, denies any pain, will continue to monitor. Rocky Bryant RN on 8/9/2020 at 4:13 AM        's with activity, will nurse on oncoming shift.Rocky Bryant RN on 8/9/2020 at 6:47 AM

## 2020-08-09 NOTE — PROGRESS NOTES
[facility]    Hospitalist Progress Note      Assessment & Plan   Alison Norwood is a 65 year old female who was admitted on 8/7/2020.     Pneumonia    Assessment: Stable.  Febrile overnight to 101, ongoing cough with minimal production, feeling short of breath and exhausted with minimal exertion.  Oxygen replaced overnight and weaned to room air this morning. Given her syncope and vomiting high suspicion for aspiration.  Also benefit from atypical bacterial antibiotic coverage.    Plan:   -IV Zosyn day 3  -IV azithromycin day 1  -urine Strep and Legionella  -Scheduled and as needed nebulizer's  - follow-up blood and urine cultures  -COVID negative  -Obtain sputum culture if able  -Incentive spirometry      D-dimer, elevated    Assessment: lower extremity ultrasound negative for DVT, CT PE protocol negative, right EJ infiltration with contrast improved.     Plan:   - ice pack to neck prn     Syncope  Assessment: Possibly vasovagal given her prodrome. Orthostatics negative in ER and have remained negative.   Plan:   -Telemetry   - troponin's negative       Chronic myeloid leukemia in remission (H)    Assessment: Chronic    Plan:   - resume home Gleevec      FEN: regular diet, discontinue normal saline  PPX: early ambulation    Code Status: Full Code    Taras Yusuf    Interval History   Overnight no acute events, febrile to 101, ongoing shortness of breath with ambulation, ongoing cough but minimal production, no significant wheezing, no orthopnea increased lower extremity edema chest pain or palpitations, no presyncopal symptoms, no further nausea or vomiting, no other new complaints.    -Data reviewed today: I reviewed all new labs and imaging results over the last 24 hours.    Physical Exam   Temp: 97.2  F (36.2  C) Temp src: Tympanic BP: 113/46 Pulse: 123 Heart Rate: 86 Resp: 16 SpO2: 94 % O2 Device: None (Room air) Oxygen Delivery: 1 LPM  Vitals:    08/07/20 1745 08/08/20 0425 08/09/20 0312    Weight: 79.7 kg (175 lb 12.8 oz) 79.1 kg (174 lb 6.4 oz) 80.4 kg (177 lb 3.2 oz)     Vital Signs with Ranges  Temp:  [97.2  F (36.2  C)-101  F (38.3  C)] 97.2  F (36.2  C)  Pulse:  [110-123] 123  Heart Rate:  [] 86  Resp:  [16-20] 16  BP: (105-113)/(41-55) 113/46  SpO2:  [87 %-96 %] 94 %  I/O last 3 completed shifts:  In: 490 [P.O.:490]  Out: 2450 [Urine:2450]    Exam:  GENERAL: Sitting up in bed, talkative and pleasant, in no apparent distress.  NECK: Supple, jugular venous distension not present.  Right neck with nearly resolved asymmetric nonerythematous edema which has been outlined with marker at site of EJ contrast extravasation.   CARDIOVASCULAR: regular rate and rhythm, no murmurs, rubs, or gallops. Normal S1/S2. No lower extremity edema.   RESPIRATORY: Ongoing persistent scant bibasilar rhonchi without wheezes or crackles, diffuse air movement in all fields, no accessory muscle use or evidence of respiratory distress.    GI: soft, non-tender, non-distended, normoactive bowel sounds.  MUSCULOSKELETAL: warm and well perfused, 2+ dorsalis pedis pulses.    SKIN: no pallor, jaundice or rashes.  NEUROLOGY: AAOx3, follows commands, speech and language without focal deficits.     Medications     - MEDICATION INSTRUCTIONS -         azithromycin  500 mg Intravenous Q24H     imatinib  400 mg Oral Daily     ipratropium - albuterol 0.5 mg/2.5 mg/3 mL  3 mL Nebulization 4x daily     piperacillin-tazobactam  3.375 g Intravenous Q6H     sodium chloride (PF)  3 mL Intracatheter Q8H       Data   Recent Labs   Lab 08/09/20  0455 08/08/20  0620 08/07/20  0600   WBC 6.9 9.4 8.2   HGB 9.2* 10.1* 11.8   * 103* 106*    146* 149*   INR  --   --  0.95    142 137   POTASSIUM 3.3* 3.5 3.8   CHLORIDE 111* 113* 106   CO2 24 24 23   BUN 8 12 18   CR 0.76 0.76 1.24*   ANIONGAP 8 5 8   MOHINDER 8.0* 7.8* 8.8   * 107* 157*   ALBUMIN  --   --  3.9   PROTTOTAL  --   --  6.0*   BILITOTAL  --   --  0.3   ALKPHOS   --   --  55   ALT  --   --  15   AST  --   --  21   LIPASE  --   --  32       Recent Results (from the past 24 hour(s))   CT Chest Pulmonary Embolism w Contrast    Narrative    PROCEDURE: CT CHEST PULMONARY EMBOLISM W CONTRAST 8/8/2020 9:28 AM    HISTORY: PE suspected, high pretest prob    COMPARISONS: August 7, 2020    Meds/Dose Given: visipaque 320 100 ml    TECHNIQUE: CT angiogram the chest was performed with sagittal and  coronal MIPS reconstructions    FINDINGS: CT angiogram reveals no pulmonary emboli are seen. The  thoracic aorta appears normal.    There are bilateral pleural effusions larger on the right than the  left the pleural effusions represent a change from previous  examination on August 7. There are extensive alveolar opacities in  both lungs which are stable from previous examination. The hilar and  mediastinal lymph nodes are normal in caliber. The axillary and  supraclavicular lymph nodes appear normal. The upper portion of the  liver spleen and pancreas appear normal.         Impression    IMPRESSION: No pulmonary emboli.     Interval development of bilateral pleural effusions.     Widespread interstitial and alveolar opacities in both lungs stable  and unchanged from August 7, 2020    OLESYA FRIEND MD

## 2020-08-10 LAB
ANION GAP SERPL CALCULATED.3IONS-SCNC: 7 MMOL/L (ref 3–14)
BUN SERPL-MCNC: 5 MG/DL (ref 7–25)
C DIFF TOX B STL QL: NEGATIVE
CALCIUM SERPL-MCNC: 8.3 MG/DL (ref 8.6–10.3)
CHLORIDE SERPL-SCNC: 110 MMOL/L (ref 98–107)
CO2 SERPL-SCNC: 25 MMOL/L (ref 21–31)
CREAT SERPL-MCNC: 0.71 MG/DL (ref 0.6–1.2)
ERYTHROCYTE [DISTWIDTH] IN BLOOD BY AUTOMATED COUNT: 14.2 % (ref 10–15)
GFR SERPL CREATININE-BSD FRML MDRD: 82 ML/MIN/{1.73_M2}
GLUCOSE SERPL-MCNC: 105 MG/DL (ref 70–105)
HCT VFR BLD AUTO: 28.8 % (ref 35–47)
HGB BLD-MCNC: 9.7 G/DL (ref 11.7–15.7)
LACTATE BLD-SCNC: 0.9 MMOL/L (ref 0.7–2)
MAGNESIUM SERPL-MCNC: 1.9 MG/DL (ref 1.9–2.7)
MCH RBC QN AUTO: 34.4 PG (ref 26.5–33)
MCHC RBC AUTO-ENTMCNC: 33.7 G/DL (ref 31.5–36.5)
MCV RBC AUTO: 102 FL (ref 78–100)
PLATELET # BLD AUTO: 169 10E9/L (ref 150–450)
POTASSIUM SERPL-SCNC: 3.7 MMOL/L (ref 3.5–5.1)
RBC # BLD AUTO: 2.82 10E12/L (ref 3.8–5.2)
SODIUM SERPL-SCNC: 142 MMOL/L (ref 134–144)
SPECIMEN SOURCE: NORMAL
WBC # BLD AUTO: 6.2 10E9/L (ref 4–11)

## 2020-08-10 PROCEDURE — 83605 ASSAY OF LACTIC ACID: CPT | Performed by: INTERNAL MEDICINE

## 2020-08-10 PROCEDURE — 25000125 ZZHC RX 250: Performed by: INTERNAL MEDICINE

## 2020-08-10 PROCEDURE — 12000000 ZZH R&B MED SURG/OB

## 2020-08-10 PROCEDURE — 87493 C DIFF AMPLIFIED PROBE: CPT | Performed by: INTERNAL MEDICINE

## 2020-08-10 PROCEDURE — 85027 COMPLETE CBC AUTOMATED: CPT | Performed by: INTERNAL MEDICINE

## 2020-08-10 PROCEDURE — 25000128 H RX IP 250 OP 636: Performed by: INTERNAL MEDICINE

## 2020-08-10 PROCEDURE — 36415 COLL VENOUS BLD VENIPUNCTURE: CPT | Performed by: INTERNAL MEDICINE

## 2020-08-10 PROCEDURE — 25000132 ZZH RX MED GY IP 250 OP 250 PS 637: Mod: GY | Performed by: INTERNAL MEDICINE

## 2020-08-10 PROCEDURE — 94640 AIRWAY INHALATION TREATMENT: CPT

## 2020-08-10 PROCEDURE — 80048 BASIC METABOLIC PNL TOTAL CA: CPT | Performed by: INTERNAL MEDICINE

## 2020-08-10 PROCEDURE — 40000275 ZZH STATISTIC RCP TIME EA 10 MIN

## 2020-08-10 PROCEDURE — 94640 AIRWAY INHALATION TREATMENT: CPT | Mod: 76

## 2020-08-10 PROCEDURE — 83735 ASSAY OF MAGNESIUM: CPT | Performed by: INTERNAL MEDICINE

## 2020-08-10 PROCEDURE — 94799 UNLISTED PULMONARY SVC/PX: CPT

## 2020-08-10 PROCEDURE — 99232 SBSQ HOSP IP/OBS MODERATE 35: CPT | Performed by: INTERNAL MEDICINE

## 2020-08-10 RX ORDER — CODEINE PHOSPHATE AND GUAIFENESIN 10; 100 MG/5ML; MG/5ML
5 SOLUTION ORAL EVERY 4 HOURS PRN
Status: DISCONTINUED | OUTPATIENT
Start: 2020-08-10 | End: 2020-08-12 | Stop reason: HOSPADM

## 2020-08-10 RX ADMIN — IPRATROPIUM BROMIDE AND ALBUTEROL SULFATE 3 ML: .5; 3 SOLUTION RESPIRATORY (INHALATION) at 19:40

## 2020-08-10 RX ADMIN — GUAIFENESIN 600 MG: 600 TABLET, EXTENDED RELEASE ORAL at 23:10

## 2020-08-10 RX ADMIN — GUAIFENESIN 600 MG: 600 TABLET, EXTENDED RELEASE ORAL at 09:47

## 2020-08-10 RX ADMIN — DEXTROMETHORPHAN HBR, GUAIFENESIN 5 ML: 20; 200 SOLUTION ORAL at 02:09

## 2020-08-10 RX ADMIN — TAZOBACTAM SODIUM AND PIPERACILLIN SODIUM 3.38 G: 375; 3 INJECTION, SOLUTION INTRAVENOUS at 23:07

## 2020-08-10 RX ADMIN — TAZOBACTAM SODIUM AND PIPERACILLIN SODIUM 3.38 G: 375; 3 INJECTION, SOLUTION INTRAVENOUS at 18:37

## 2020-08-10 RX ADMIN — TAZOBACTAM SODIUM AND PIPERACILLIN SODIUM 3.38 G: 375; 3 INJECTION, SOLUTION INTRAVENOUS at 11:28

## 2020-08-10 RX ADMIN — TAZOBACTAM SODIUM AND PIPERACILLIN SODIUM 3.38 G: 375; 3 INJECTION, SOLUTION INTRAVENOUS at 05:36

## 2020-08-10 RX ADMIN — IPRATROPIUM BROMIDE AND ALBUTEROL SULFATE 3 ML: .5; 3 SOLUTION RESPIRATORY (INHALATION) at 14:08

## 2020-08-10 RX ADMIN — IPRATROPIUM BROMIDE AND ALBUTEROL SULFATE 3 ML: .5; 3 SOLUTION RESPIRATORY (INHALATION) at 06:19

## 2020-08-10 RX ADMIN — IBUPROFEN 200 MG: 200 TABLET, FILM COATED ORAL at 12:56

## 2020-08-10 RX ADMIN — IPRATROPIUM BROMIDE AND ALBUTEROL SULFATE 3 ML: .5; 3 SOLUTION RESPIRATORY (INHALATION) at 10:37

## 2020-08-10 ASSESSMENT — ACTIVITIES OF DAILY LIVING (ADL)
ADLS_ACUITY_SCORE: 16
ADLS_ACUITY_SCORE: 13
ADLS_ACUITY_SCORE: 16
ADLS_ACUITY_SCORE: 14
ADLS_ACUITY_SCORE: 13
ADLS_ACUITY_SCORE: 13

## 2020-08-10 ASSESSMENT — MIFFLIN-ST. JEOR: SCORE: 1347.82

## 2020-08-10 NOTE — PLAN OF CARE
Problem: Infection (Pneumonia)  Goal: Resolution of Infection Signs/Symptoms  8/10/2020 1813 by Lorie Traylor, RN  Outcome: Improving     Problem: Respiratory Compromise (Pneumonia)  Goal: Effective Oxygenation and Ventilation  8/10/2020 1813 by Lorie Traylor, RN  Outcome: No Change    Patient rested comfortably for later half of shift. Did have one BM that was dark and watery, sample sent to lab. Alert and oriented. Skin is intact. Frequent unproductive cough. Dyspnea on exertion.     Lorie Traylor RN on 8/10/2020 at 6:15 PM

## 2020-08-10 NOTE — PROGRESS NOTES
[facility]    Hospitalist Progress Note      Assessment & Plan   Alison Norwood is a 65 year old female who was admitted on 8/7/2020.     Pneumonia    Assessment: slowly improving, no further high grade fevers, more productive cough and persistent 1L O2 requirement today.  Given her syncope and vomiting high suspicion for aspiration.  1L ivf given yesterday for increased lactate and resting tachycardia.     Plan:   -IV Zosyn day 4  -discontinue IV azithromycin after 1 day   -urine Legionella and strep negative  -Scheduled and as needed nebulizer's  -COVID negative  -Obtain sputum culture if able  - start mucinex bid  - check cdiff if having more diarrhea      D-dimer, elevated    Assessment: lower extremity ultrasound negative for DVT, CT PE protocol negative, right EJ infiltration with contrast resolved.     Plan:   - ice pack to neck prn     Syncope  Assessment: Possibly vasovagal given her prodrome. Orthostatics negative in ER and have remained negative.   Plan:   -Telemetry   - troponin's negative       Chronic myeloid leukemia in remission (H)    Assessment: Chronic    Plan:   - resume home Gleevec      FEN: regular diet  PPX: early ambulation    Code Status: Full Code    Taras Yusuf    Interval History   Overnight no acute events, no further fevers, cough is more productive, no wheezing, no dyspnea at rest, no palpitations or chest pain, appetite significantly improved, having intermittent non bloody loose stools. No abdominal pain. No presyncopal symptoms, no other new complaints.    -Data reviewed today: I reviewed all new labs and imaging results over the last 24 hours.    Physical Exam   Temp: 99.8  F (37.7  C) Temp src: Tympanic BP: 119/51 Pulse: 120 Heart Rate: 117 Resp: 18 SpO2: 91 % O2 Device: None (Room air) Oxygen Delivery: 1 LPM  Vitals:    08/08/20 0425 08/09/20 0312 08/10/20 0500   Weight: 79.1 kg (174 lb 6.4 oz) 80.4 kg (177 lb 3.2 oz) 79.1 kg (174 lb 6.4 oz)     Vital Signs  with Ranges  Temp:  [97.2  F (36.2  C)-100.5  F (38.1  C)] 99.8  F (37.7  C)  Pulse:  [101-120] 120  Heart Rate:  [] 117  Resp:  [16-20] 18  BP: (119-132)/(50-70) 119/51  SpO2:  [82 %-95 %] 91 %  I/O last 3 completed shifts:  In: 1440 [P.O.:440; I.V.:1000]  Out: 1975 [Urine:1975]    Exam:  GENERAL: Sitting up in bed, talkative and pleasant, in no apparent distress.  NECK: Supple, jugular venous distension not present.  Right neck with now resolved swelling and no overlying skin changes.  CARDIOVASCULAR: regular rate and rhythm, no murmurs, rubs, or gallops. Normal S1/S2. No lower extremity edema.   RESPIRATORY: resolving scant bibasilar rhonchi now only at the bases and have significantly regressed from yesterday.  Diffuse air movement in all fields, no accessory muscle use or evidence of respiratory distress.    GI: soft, non-tender, non-distended, normoactive bowel sounds.  MUSCULOSKELETAL: warm and well perfused, 2+ dorsalis pedis pulses.    SKIN: no pallor, jaundice or rashes.  NEUROLOGY: AAOx3, follows commands, speech and language without focal deficits.     Medications     - MEDICATION INSTRUCTIONS -         guaiFENesin  600 mg Oral BID     imatinib  400 mg Oral Daily     ipratropium - albuterol 0.5 mg/2.5 mg/3 mL  3 mL Nebulization 4x daily     piperacillin-tazobactam  3.375 g Intravenous Q6H     sodium chloride (PF)  3 mL Intracatheter Q8H       Data   Recent Labs   Lab 08/10/20  0509 08/09/20  0455 08/08/20  0620 08/07/20  0600   WBC 6.2 6.9 9.4 8.2   HGB 9.7* 9.2* 10.1* 11.8   * 103* 103* 106*    152 146* 149*   INR  --   --   --  0.95    143 142 137   POTASSIUM 3.7 3.3* 3.5 3.8   CHLORIDE 110* 111* 113* 106   CO2 25 24 24 23   BUN 5* 8 12 18   CR 0.71 0.76 0.76 1.24*   ANIONGAP 7 8 5 8   MOHINDER 8.3* 8.0* 7.8* 8.8    110* 107* 157*   ALBUMIN  --   --   --  3.9   PROTTOTAL  --   --   --  6.0*   BILITOTAL  --   --   --  0.3   ALKPHOS  --   --   --  55   ALT  --   --   --  15    AST  --   --   --  21   LIPASE  --   --   --  32       No results found for this or any previous visit (from the past 24 hour(s)).

## 2020-08-10 NOTE — PHARMACY-ADMISSION MEDICATION HISTORY
Pharmacy -- Admission Medication Reconciliation    Prior to admission (PTA) medications were reviewed and the patient's PTA medication list was updated.    Sources Consulted: patient, chart notes, Surescripts    The reliability of this Medication Reconciliation is: Reliability: Reliable    The following significant changes were made:  No changes were made to patient's medication list- patient denies taking other medications besides Imatinib 400 mg daily    In addition, the patient's allergies were reviewed with the patient and left as follows:   Allergies: Patient has no known allergies.    The pharmacist has reviewed with the patient that all personal medications should be removed from the building or locked in the belongings safe.  Patient shall only take medications ordered by the physician and administered by the nursing staff.    *Notes: patient was approved to use home supply of Imatinib- as addressed in previous note, medication was verified by pharmacy and labeled.     Medication barriers identified: none noted   Medication adherence concerns: none noted   Understanding of emergency medications: N/A    Tam Israel RPH, 8/9/2020,  7:39 PM

## 2020-08-10 NOTE — PROGRESS NOTES
Attempted RA.  Sats in to the upper 80's while off.  Denies intolerable SOB though is SOB with act.  Frequent cough yet.  Unable to produce anything.  Denies pain.  O2 replaced at 1L NC.  Sats slowly up into the 90's with CDB encouraged.  Using IS independaently. Cont to monitor.

## 2020-08-10 NOTE — PLAN OF CARE
"Pt afebrile, Hr tachy  this shift and up to 120 with activity, B/P stable. Blood pressure 131/70, pulse 120, temperature 99  F (37.2  C), temperature source Tympanic, resp. rate 18, height 1.676 m (5' 6\"), weight 79.1 kg (174 lb 6.4 oz), SpO2 93 %.    Crackles heard in bilateral bases, frequent cough, PRN Robitussin given x 2. Shortness of breath with activity, O2 saturations 93% on acute 1 LPM NC. Bowel sounds active throughout, denies nausea, reports increased appetite. Pt denies any pain at this time, will continue to monitor. Rocky Bryant RN on 8/10/2020 at 5:55 AM    "

## 2020-08-10 NOTE — PROGRESS NOTES
Temp 101.  Feels ok but states can tell she got a small temp.  Med with ibuprofen per request.  Cont to monitor.

## 2020-08-11 LAB
ANION GAP SERPL CALCULATED.3IONS-SCNC: 7 MMOL/L (ref 3–14)
BACTERIA SPEC CULT: NORMAL
BUN SERPL-MCNC: 8 MG/DL (ref 7–25)
CALCIUM SERPL-MCNC: 8.6 MG/DL (ref 8.6–10.3)
CHLORIDE SERPL-SCNC: 106 MMOL/L (ref 98–107)
CO2 SERPL-SCNC: 27 MMOL/L (ref 21–31)
CREAT SERPL-MCNC: 0.71 MG/DL (ref 0.6–1.2)
ERYTHROCYTE [DISTWIDTH] IN BLOOD BY AUTOMATED COUNT: 13.9 % (ref 10–15)
GFR SERPL CREATININE-BSD FRML MDRD: 82 ML/MIN/{1.73_M2}
GLUCOSE SERPL-MCNC: 103 MG/DL (ref 70–105)
HCT VFR BLD AUTO: 30.1 % (ref 35–47)
HGB BLD-MCNC: 10.1 G/DL (ref 11.7–15.7)
LACTATE BLD-SCNC: 1 MMOL/L (ref 0.7–2)
MAGNESIUM SERPL-MCNC: 1.9 MG/DL (ref 1.9–2.7)
MCH RBC QN AUTO: 34.1 PG (ref 26.5–33)
MCHC RBC AUTO-ENTMCNC: 33.6 G/DL (ref 31.5–36.5)
MCV RBC AUTO: 102 FL (ref 78–100)
PLATELET # BLD AUTO: 206 10E9/L (ref 150–450)
POTASSIUM SERPL-SCNC: 3.5 MMOL/L (ref 3.5–5.1)
PROCALCITONIN SERPL-MCNC: 0.61 NG/ML
RBC # BLD AUTO: 2.96 10E12/L (ref 3.8–5.2)
SODIUM SERPL-SCNC: 140 MMOL/L (ref 134–144)
SPECIMEN SOURCE: NORMAL
WBC # BLD AUTO: 4.4 10E9/L (ref 4–11)

## 2020-08-11 PROCEDURE — 83605 ASSAY OF LACTIC ACID: CPT | Performed by: INTERNAL MEDICINE

## 2020-08-11 PROCEDURE — 25000125 ZZHC RX 250: Performed by: INTERNAL MEDICINE

## 2020-08-11 PROCEDURE — 80048 BASIC METABOLIC PNL TOTAL CA: CPT | Performed by: INTERNAL MEDICINE

## 2020-08-11 PROCEDURE — 12000000 ZZH R&B MED SURG/OB

## 2020-08-11 PROCEDURE — 94640 AIRWAY INHALATION TREATMENT: CPT

## 2020-08-11 PROCEDURE — 36415 COLL VENOUS BLD VENIPUNCTURE: CPT | Performed by: INTERNAL MEDICINE

## 2020-08-11 PROCEDURE — 85027 COMPLETE CBC AUTOMATED: CPT | Performed by: INTERNAL MEDICINE

## 2020-08-11 PROCEDURE — 25000128 H RX IP 250 OP 636: Performed by: INTERNAL MEDICINE

## 2020-08-11 PROCEDURE — 25000132 ZZH RX MED GY IP 250 OP 250 PS 637: Mod: GY | Performed by: INTERNAL MEDICINE

## 2020-08-11 PROCEDURE — 40000275 ZZH STATISTIC RCP TIME EA 10 MIN

## 2020-08-11 PROCEDURE — 83735 ASSAY OF MAGNESIUM: CPT | Performed by: INTERNAL MEDICINE

## 2020-08-11 PROCEDURE — 94640 AIRWAY INHALATION TREATMENT: CPT | Mod: 76

## 2020-08-11 PROCEDURE — 84145 PROCALCITONIN (PCT): CPT | Performed by: INTERNAL MEDICINE

## 2020-08-11 PROCEDURE — 99232 SBSQ HOSP IP/OBS MODERATE 35: CPT | Performed by: INTERNAL MEDICINE

## 2020-08-11 PROCEDURE — 94799 UNLISTED PULMONARY SVC/PX: CPT

## 2020-08-11 RX ORDER — IPRATROPIUM BROMIDE AND ALBUTEROL SULFATE 2.5; .5 MG/3ML; MG/3ML
3 SOLUTION RESPIRATORY (INHALATION) EVERY 4 HOURS
Status: DISCONTINUED | OUTPATIENT
Start: 2020-08-11 | End: 2020-08-12 | Stop reason: HOSPADM

## 2020-08-11 RX ADMIN — TAZOBACTAM SODIUM AND PIPERACILLIN SODIUM 3.38 G: 375; 3 INJECTION, SOLUTION INTRAVENOUS at 12:15

## 2020-08-11 RX ADMIN — IPRATROPIUM BROMIDE AND ALBUTEROL SULFATE 3 ML: .5; 3 SOLUTION RESPIRATORY (INHALATION) at 14:37

## 2020-08-11 RX ADMIN — IPRATROPIUM BROMIDE AND ALBUTEROL SULFATE 3 ML: .5; 3 SOLUTION RESPIRATORY (INHALATION) at 07:39

## 2020-08-11 RX ADMIN — ACETAMINOPHEN 650 MG: 325 TABLET ORAL at 17:09

## 2020-08-11 RX ADMIN — TAZOBACTAM SODIUM AND PIPERACILLIN SODIUM 3.38 G: 375; 3 INJECTION, SOLUTION INTRAVENOUS at 05:39

## 2020-08-11 RX ADMIN — AMOXICILLIN AND CLAVULANATE POTASSIUM 1 TABLET: 875; 125 TABLET, FILM COATED ORAL at 20:34

## 2020-08-11 RX ADMIN — GUAIFENESIN 600 MG: 600 TABLET, EXTENDED RELEASE ORAL at 09:41

## 2020-08-11 RX ADMIN — IPRATROPIUM BROMIDE AND ALBUTEROL SULFATE 3 ML: .5; 3 SOLUTION RESPIRATORY (INHALATION) at 09:58

## 2020-08-11 RX ADMIN — GUAIFENESIN 600 MG: 600 TABLET, EXTENDED RELEASE ORAL at 21:02

## 2020-08-11 RX ADMIN — IPRATROPIUM BROMIDE AND ALBUTEROL SULFATE 3 ML: .5; 3 SOLUTION RESPIRATORY (INHALATION) at 20:04

## 2020-08-11 ASSESSMENT — MIFFLIN-ST. JEOR: SCORE: 1339.66

## 2020-08-11 ASSESSMENT — ACTIVITIES OF DAILY LIVING (ADL)
ADLS_ACUITY_SCORE: 15

## 2020-08-11 NOTE — PROGRESS NOTES
"/55   Pulse 114   Temp 97.8  F (36.6  C) (Tympanic)   Resp 18   Ht 1.676 m (5' 6\")   Wt 78.3 kg (172 lb 9.6 oz)   SpO2 94%   BMI 27.86 kg/m    Pt is AxO x4, VSS, afebrile. Pt denies any pain. Pt states she feels like she is unable to take a deep breath at times, denies SOB. Pt encouraged to use IS and flutter valve. Writer was able to wean pt down to 1 LPM, sat 93%. Will continue to monitor.   "

## 2020-08-11 NOTE — PROGRESS NOTES
[facility]    Medicine Progress Note - Hospitalist Service       Date of Admission:  8/7/2020  Assessment & Plan      Pneumonia    Assessment: slow improvement. no further high grade fevers, more productive cough and persistent 1L O2 requirement today.  patient has not been out of bed or exerted herself in any way.     Plan: Stop zosyn, switch to Augmentin.      D-dimer, elevated    Assessment: lower extremity ultrasound negative for DVT, CT PE protocol negative, right EJ infiltration with contrast resolved.     Plan:   - ice pack to neck prn     Syncope  Assessment: Possibly vasovagal given her prodrome. Orthostatics negative in ER and have remained negative.   Plan:   -Telemetry   - troponin's negative        Chronic myeloid leukemia in remission (H)    Assessment: Chronic    Plan:   - resume home Gleevec      Diet: Regular Diet Adult    DVT Prophylaxis: Pneumatic Compression Devices  Blankenship Catheter: not present  Code Status: Full Code           Disposition Plan   Expected discharge: Tomorrow, recommended to prior living arrangement once antibiotic plan established and O2 use less than 2 liters/minute.  Entered: Renzo Wilson MD 08/11/2020, 1:15 PM       The patient's care was discussed with the Patient and spouse.    Renzo Wilson MD  Hospitalist Service  [facility]    ______________________________________________________________________    Interval History   Feels better. Still dyspnea on exertion, cough. No fevers, chills. Unsteady with walking. No nausea, vomiting.     Data reviewed today: I reviewed all medications, new labs and imaging results over the last 24 hours. I personally reviewed no images or EKG's today.    Physical Exam   Vital Signs: Temp: 97.8  F (36.6  C) Temp src: Tympanic BP: 118/55 Pulse: 114 Heart Rate: 95 Resp: 18 SpO2: 91 % O2 Device: Nasal cannula Oxygen Delivery: 1/2 LPM  Weight: 172 lbs 9.6 oz  GENERAL: Comfortable, no apparent  distress.  CARDIOVASCULAR: regular rate and rhythm, no murmur. No lower extremity edema   RESPIRATORY: Clear to auscultation bilaterally, no wheezes or crackles.  GI: non-tender, non-distended, normal bowel sounds.   SKIN: warm periphery, no rashes      Data   Recent Labs   Lab 08/11/20  0532 08/10/20  0509 08/09/20  0455  08/07/20  0600   WBC 4.4 6.2 6.9   < > 8.2   HGB 10.1* 9.7* 9.2*   < > 11.8   * 102* 103*   < > 106*    169 152   < > 149*   INR  --   --   --   --  0.95    142 143   < > 137   POTASSIUM 3.5 3.7 3.3*   < > 3.8   CHLORIDE 106 110* 111*   < > 106   CO2 27 25 24   < > 23   BUN 8 5* 8   < > 18   CR 0.71 0.71 0.76   < > 1.24*   ANIONGAP 7 7 8   < > 8   MOHINDER 8.6 8.3* 8.0*   < > 8.8    105 110*   < > 157*   ALBUMIN  --   --   --   --  3.9   PROTTOTAL  --   --   --   --  6.0*   BILITOTAL  --   --   --   --  0.3   ALKPHOS  --   --   --   --  55   ALT  --   --   --   --  15   AST  --   --   --   --  21   LIPASE  --   --   --   --  32    < > = values in this interval not displayed.     Medications     - MEDICATION INSTRUCTIONS -         guaiFENesin  600 mg Oral BID     imatinib  400 mg Oral Daily     ipratropium - albuterol 0.5 mg/2.5 mg/3 mL  3 mL Nebulization Q4H     piperacillin-tazobactam  3.375 g Intravenous Q6H     sodium chloride (PF)  3 mL Intracatheter Q8H

## 2020-08-11 NOTE — PROGRESS NOTES
"Pt weaned off 02, o2 sat 92-93% on RA. Pt up in room ambulating, writer spot checked pt, pt sat at 88%. After pt sat down she recovered quickly to 92% on RA. Pt reports she does fell SOB with ambulation. Pt has been using IS and flutter valve. Pt states she \" feels much better than a couple days ago.\"   Will continue to monitor.   "

## 2020-08-11 NOTE — PLAN OF CARE
"Pt afebrile, HR tachy 108-114, increased to 120 with activity, all other vital signs stable. Lung sounds course throughout, unable to wean oxygen, pt 88% on 1 liter now increased to 2 LPM NC with saturations 92%. Pt has dyspnea upon exertion, sob, frequent dry nonproductive cough, cough drops at bedside and IS encouraged.  Bowel sounds active throughout, no loose stools this shift, enteric precautions discontinued per protocol. Pt denies any [ain, will continue to monitor.  Blood pressure 132/67, pulse 114, temperature 98.3  F (36.8  C), temperature source Tympanic, resp. rate 18, height 1.676 m (5' 6\"), weight 78.3 kg (172 lb 9.6 oz), SpO2 92 %.    "

## 2020-08-12 VITALS
OXYGEN SATURATION: 92 % | RESPIRATION RATE: 20 BRPM | HEIGHT: 66 IN | TEMPERATURE: 99.2 F | SYSTOLIC BLOOD PRESSURE: 134 MMHG | DIASTOLIC BLOOD PRESSURE: 55 MMHG | HEART RATE: 114 BPM | WEIGHT: 171.8 LBS | BODY MASS INDEX: 27.61 KG/M2

## 2020-08-12 LAB — POTASSIUM SERPL-SCNC: 3.5 MMOL/L (ref 3.5–5.1)

## 2020-08-12 PROCEDURE — 94640 AIRWAY INHALATION TREATMENT: CPT

## 2020-08-12 PROCEDURE — 36415 COLL VENOUS BLD VENIPUNCTURE: CPT | Performed by: INTERNAL MEDICINE

## 2020-08-12 PROCEDURE — 84132 ASSAY OF SERUM POTASSIUM: CPT | Performed by: INTERNAL MEDICINE

## 2020-08-12 PROCEDURE — 25000125 ZZHC RX 250: Performed by: INTERNAL MEDICINE

## 2020-08-12 PROCEDURE — 25000132 ZZH RX MED GY IP 250 OP 250 PS 637: Mod: GY | Performed by: INTERNAL MEDICINE

## 2020-08-12 PROCEDURE — 99239 HOSP IP/OBS DSCHRG MGMT >30: CPT | Performed by: INTERNAL MEDICINE

## 2020-08-12 PROCEDURE — 40000275 ZZH STATISTIC RCP TIME EA 10 MIN

## 2020-08-12 RX ORDER — ACETAMINOPHEN 325 MG/1
650 TABLET ORAL EVERY 4 HOURS PRN
COMMUNITY
Start: 2020-08-12 | End: 2020-08-18

## 2020-08-12 RX ORDER — CODEINE PHOSPHATE AND GUAIFENESIN 10; 100 MG/5ML; MG/5ML
5 SOLUTION ORAL EVERY 4 HOURS PRN
Qty: 180 ML | Refills: 0 | Status: SHIPPED | OUTPATIENT
Start: 2020-08-12 | End: 2020-08-18

## 2020-08-12 RX ORDER — GUAIFENESIN 600 MG/1
600 TABLET, EXTENDED RELEASE ORAL 2 TIMES DAILY
Qty: 30 TABLET | Refills: 0 | Status: SHIPPED | OUTPATIENT
Start: 2020-08-12 | End: 2020-08-18

## 2020-08-12 RX ADMIN — AMOXICILLIN AND CLAVULANATE POTASSIUM 1 TABLET: 875; 125 TABLET, FILM COATED ORAL at 07:55

## 2020-08-12 RX ADMIN — IPRATROPIUM BROMIDE AND ALBUTEROL SULFATE 3 ML: .5; 3 SOLUTION RESPIRATORY (INHALATION) at 06:14

## 2020-08-12 RX ADMIN — GUAIFENESIN 600 MG: 600 TABLET, EXTENDED RELEASE ORAL at 09:48

## 2020-08-12 ASSESSMENT — ACTIVITIES OF DAILY LIVING (ADL)
ADLS_ACUITY_SCORE: 15

## 2020-08-12 ASSESSMENT — MIFFLIN-ST. JEOR: SCORE: 1336.03

## 2020-08-12 NOTE — PLAN OF CARE
"/55   Pulse 114   Temp 99.2  F (37.3  C) (Tympanic)   Resp 20   Ht 1.676 m (5' 6\")   Wt 77.9 kg (171 lb 12.8 oz)   SpO2 92%   BMI 27.73 kg/m    AxO x4, VSS, afebrile. Pt denies any pain, pt up ambulating in room; denies SOB with activity. Pt has a frequent dry cough. Pt HR remains tachy.  Pt encouraged to use flutter valve and IS. Pt resting comfortably. Will continue to monitor.   "

## 2020-08-12 NOTE — PHARMACY
Pharmacy:  Discharge Counseling and Medication Reconciliation    Alison Norwood  [address] [address]  [phone] (home)   65 year old female  PCP: No Ref-Primary, Physician    Allergies: Patient has no known allergies.    Discharge Counseling:    Pharmacist met with patient (and/or family) today to review the medication portion of the After Visit Summary (with an emphasis on NEW medications) and to address patient's questions/concerns.    Summary of Education: Met with patient and reviewed indication, expected duration, potential side effects, and answered questions.      Materials Provided:  MedCounselor sheets printed from Clinical Pharmacology on: Robitussin DM, APAP, Augmentin and Mucines    Discharge Medication Reconciliation:    It has been determined that the patient has an adequate supply of medications available or which can be obtained from the patient's preferred pharmacy.    Thank you for the consult.    Sergey Arora RPH........August 12, 2020 10:04 AM

## 2020-08-12 NOTE — DISCHARGE SUMMARY
"[facility]    Discharge Summary  Hospitalist    Date of Admission:  8/7/2020  Date of Discharge:  8/12/2020 10:39 AM  Discharging Provider: Renzo Wilson  Date of Service (when I saw the patient): 08/12/20    Discharge Diagnoses   Principal Problem:    Community acquired vs aspiration pneumonia   Active Problems:    Chronic myeloid leukemia in remission (H) (6/2/2011)    D-dimer, elevated (8/7/2020)    CHANG (acute kidney injury) (H) (8/7/2020)      History of Present Illness   Alison Norwood is an 65 year old female who presented with community acquired pneumonia vs aspiration pneumonia. Per the H&P by Dr. Yusuf:  \"Alison Norwood is a 65 year old female who presents with syncope.  Patient has been her normal state of health but started feeling poorly last night after eating dinner.  She felt nauseated like she is going to vomit.  Early this morning she awoke with nausea and vomiting, she was sitting in a chair after a bout of vomiting, felt lightheaded like she was going to faint, lowered herself to the ground, fainted and had full loss of consciousness for nearly 30 seconds, when she came to she had noticed she had vomited and that she was very short of breath with a new nonproductive cough.  She felt extremely weak, EMS was called, she had a blood pressure of 84/55 on initial eval, and was brought to the ER.     In the ER she received Zosyn, 1 L of IV fluids, lactate improved, had multiple issues with IV infiltration including a right EJ for CT PE protocol and she was subsequently admitted for further management.\"    Hospital Course   Alison Norwood was admitted on 8/7/2020.  The following problems were addressed during her hospitalization:    Pneumonia   Patient was started on Zosyn.  Over the course of her hospital stay her symptoms improved.  She was transitioned to Augmentin.  Her supplemental oxygen need was weaned to off.  She will complete a course of Augmentin and follow-up in the " clinic to establish care.    CML  Patient is on Gleevec chronically.  She is going to establish care with Dr. Rosales in September 2020.      Renzo Wilson MD      Code Status   Full Code       Primary Care Physician   Physician No Ref-Primary    Physical Exam   Temp: 99.2  F (37.3  C) Temp src: Tympanic BP: 134/55   Heart Rate: 108 Resp: 20 SpO2: 92 % O2 Device: None (Room air) Oxygen Delivery: 1/2 LPM  Vitals:    08/10/20 0500 08/11/20 0332 08/12/20 0430   Weight: 79.1 kg (174 lb 6.4 oz) 78.3 kg (172 lb 9.6 oz) 77.9 kg (171 lb 12.8 oz)     Vital Signs with Ranges  Temp:  [98.9  F (37.2  C)-101.3  F (38.5  C)] 99.2  F (37.3  C)  Heart Rate:  [103-119] 108  Resp:  [16-20] 20  BP: (112-154)/(53-62) 134/55  SpO2:  [90 %-93 %] 92 %  I/O last 3 completed shifts:  In: 450 [P.O.:380; I.V.:70]  Out: 2450 [Urine:2450]    GENERAL: Comfortable, no apparent distress.  CARDIOVASCULAR: regular rate and rhythm, no murmur. No lower extremity edema   RESPIRATORY: Clear to auscultation bilaterally, no wheezes or crackles.  GI: non-tender, non-distended, normal bowel sounds.   SKIN: warm periphery, no rashes      Discharge Disposition   Discharged to home  Condition at discharge: Stable    Consultations This Hospital Stay   None    Time Spent on this Encounter   I, Renzo Wilson MD, personally saw the patient today and spent greater than 30 minutes discharging this patient.    Discharge Orders      Oncology/Hematology Adult Referral      Reason for your hospital stay    Pneumonia     Follow-up and recommended labs and tests    Patient has follow up with Dr. Esquivel Aug 18th at 3:20.   You also have a Dr. Rosales (Oncology) appt on 9/17 at 1:00.     Activity    Your activity upon discharge: activity as tolerated     Discharge Instructions    Use your incentive spirometer for breathing exercises at home. Tylenol for fever. If worsening fever or shortness of breath, please come back in to be seen.     Full Code     Diet     Follow this diet upon discharge: Orders Placed This Encounter      Regular Diet Adult       Discharge Medications   Current Discharge Medication List      START taking these medications    Details   acetaminophen (TYLENOL) 325 MG tablet Take 2 tablets (650 mg) by mouth every 4 hours as needed for mild pain or fever  Qty:      Associated Diagnoses: Pneumonia due to infectious organism, unspecified laterality, unspecified part of lung      amoxicillin-clavulanate (AUGMENTIN) 875-125 MG tablet Take 1 tablet by mouth every 12 hours  Qty: 11 tablet, Refills: 0    Associated Diagnoses: Pneumonia due to infectious organism, unspecified laterality, unspecified part of lung      guaiFENesin (MUCINEX) 600 MG 12 hr tablet Take 1 tablet (600 mg) by mouth 2 times daily  Qty: 30 tablet, Refills: 0    Associated Diagnoses: Pneumonia due to infectious organism, unspecified laterality, unspecified part of lung      guaiFENesin-codeine (ROBITUSSIN AC) 100-10 MG/5ML solution Take 5 mLs by mouth every 4 hours as needed for cough  Qty: 180 mL, Refills: 0    Associated Diagnoses: Pneumonia due to infectious organism, unspecified laterality, unspecified part of lung         CONTINUE these medications which have NOT CHANGED    Details   imatinib (GLEEVEC) 400 MG TABS tablet Take 400 mg by mouth daily           Allergies   No Known Allergies  Data   Most Recent 3 CBC's:  Recent Labs   Lab Test 08/11/20  0532 08/10/20  0509 08/09/20  0455   WBC 4.4 6.2 6.9   HGB 10.1* 9.7* 9.2*   * 102* 103*    169 152      Most Recent 3 BMP's:  Recent Labs   Lab Test 08/12/20  0500 08/11/20  0532 08/10/20  0509 08/09/20  0455   NA  --  140 142 143   POTASSIUM 3.5 3.5 3.7 3.3*   CHLORIDE  --  106 110* 111*   CO2  --  27 25 24   BUN  --  8 5* 8   CR  --  0.71 0.71 0.76   ANIONGAP  --  7 7 8   MOHINDER  --  8.6 8.3* 8.0*   GLC  --  103 105 110*     Most Recent 2 LFT's:  Recent Labs   Lab Test 08/07/20  0600   AST 21   ALT 15   ALKPHOS 55   BILITOTAL  0.3     Most Recent INR's and Anticoagulation Dosing History:  Anticoagulation Dose History     Recent Dosing and Labs Latest Ref Rng & Units 8/7/2020    INR 0 - 1.3 0.95        Most Recent 3 Troponin's:No lab results found.  Most Recent Cholesterol Panel:No lab results found.  Most Recent 6 Bacteria Isolates From Any Culture (See EPIC Reports for Culture Details):  Recent Labs   Lab Test 08/07/20  1214 08/07/20  0700   CULT Canceled, Test credited  NO SAMPLE RECEIVED IN MICRO.   No growth after 5 days  No growth after 5 days     Most Recent TSH, T4 and A1c Labs:No lab results found.  Results for orders placed or performed during the hospital encounter of 08/07/20   IR Port Check Right    Narrative    IR PORT CHECK RIGHT    HISTORY: 65 years Female yes  with recent placement of left-sided  external jugular catheter. The provider was uncertain of position of  the catheter, there was no hold back of blood from the lumen.    COMPARISON: None    TECHNIQUE: 30 cc of contrast was injected under fluoroscopic  observation. There is opacification of the external jugular or branch  leading to the external jugular. There was no extravasation of  contrast.    Findings and     Impression    impression: Intraluminal location of central line. The tip of the  central line is within the right external jugular vein or a smaller  side branch of the external jugular vein    ROMAIN VELOZ MD   CT Chest w Contrast    Narrative    PROCEDURE: CT CHEST W CONTRAST 8/7/2020 12:30 PM    HISTORY: PE suspected, intermediate prob, positive D-dimer    COMPARISONS: None.    Meds/Dose Given: Visipaque 320 100 mL    TECHNIQUE: CT angiogram of the chest was performed with IV contrast  sagittal coronal reconstructions were obtained.    FINDINGS: There is contrast extravasation in the right side of the  neck. No opacification of the pulmonary arteries was obtained.  Extensive alveolar opacities are noted in both lungs most severe in  the lower  lobes bilaterally with air bronchograms. Significant opacity  is also present in the right upper lobe and mildly opacity is seen in  the left upper lobe. These opacities are suspicious for pneumonia. The  hilar and mediastinal lymph nodes are normal in caliber. Axillary and  supraclavicular lymph nodes appear normal. The upper portion of the  liver and spleen appear normal. Degenerative changes are present in  the thoracic spine.         Impression    IMPRESSION: Extensive bilateral alveolar opacities in both lungs most  consistent with pneumonia.    OLESYA FRIEND MD   US Lower Extremity Venous Duplex Bilateral    Narrative    Exam:US LOWER EXTREMITY VENOUS DUPLEX BILATERAL    History:  65 years Female   : With shortness of breath and elevated  d-dimer. Patient failed CT angiography.    Comparisons: None    Technique: Venous duplex ultrasonography of the bilateral lower  extremities was performed.     Findings: The common femoral veins, superficial femoral veins and  popliteal veins are fully compressible with spontaneous and  augmentable venous flow.           Impression    Impression: No evidence of deep venous thrombosis within the lower  extremities.    ROMAIN VELOZ MD   CT Chest Pulmonary Embolism w Contrast    Narrative    PROCEDURE: CT CHEST PULMONARY EMBOLISM W CONTRAST 8/8/2020 9:28 AM    HISTORY: PE suspected, high pretest prob    COMPARISONS: August 7, 2020    Meds/Dose Given: visipaque 320 100 ml    TECHNIQUE: CT angiogram the chest was performed with sagittal and  coronal MIPS reconstructions    FINDINGS: CT angiogram reveals no pulmonary emboli are seen. The  thoracic aorta appears normal.    There are bilateral pleural effusions larger on the right than the  left the pleural effusions represent a change from previous  examination on August 7. There are extensive alveolar opacities in  both lungs which are stable from previous examination. The hilar and  mediastinal lymph nodes are normal in  caliber. The axillary and  supraclavicular lymph nodes appear normal. The upper portion of the  liver spleen and pancreas appear normal.         Impression    IMPRESSION: No pulmonary emboli.     Interval development of bilateral pleural effusions.     Widespread interstitial and alveolar opacities in both lungs stable  and unchanged from August 7, 2020    OLESYA FRIEND MD

## 2020-08-12 NOTE — PLAN OF CARE
Pt slept throughout night. LS diminished, HR remains tachy. Pt states she feels much better. Denies pain, no dyspnea noted with activity in room. VSS.   Catia Tena RN on 8/12/2020 at 5:27 AM    Pt's HR in 130-140 per telemetry when pt ambulating. Pt asymptomatic, MD aware.   Catia Tena RN on 8/12/2020 at 5:57 AM

## 2020-08-12 NOTE — PROGRESS NOTES
[facility] DISCHARGE NOTE    Patient discharged to home at 10:39 AM via wheel chair. Accompanied by significant other and staff. Discharge instructions reviewed with patient, opportunity offered to ask questions. Prescriptions sent to patients preferred pharmacy. All belongings sent with patient.    Hannah Ott

## 2020-08-13 ENCOUNTER — PATIENT OUTREACH (OUTPATIENT)
Dept: CARE COORDINATION | Facility: CLINIC | Age: 65
End: 2020-08-13

## 2020-08-13 LAB
BACTERIA SPEC CULT: NORMAL
BACTERIA SPEC CULT: NORMAL
SPECIMEN SOURCE: NORMAL
SPECIMEN SOURCE: NORMAL

## 2020-08-13 NOTE — PROGRESS NOTES
Transitional Care Management Phone Call    Summary of hospitalization:  [facility] discharge summary reviewed    DISCHARGE DIAGNOSIS: chronic myeloid leukemia in remission    DATE OF DISCHARGE: 08-12-20    Diagnostic Tests/Treatments reviewed.  Follow up needed: none    Post Discharge Medication Reconciliation: discharge medications reconciled, continue medications without change.    Medications reviewed by: by myself    Problems taking medications regularly:  None    Problems adhering to non-medication therapy:  None    Other Healthcare Providers Involved in Patient s Care:         None     Update since discharge: improved. SOB is better. Has coughing spells that are painful at times. Nothing being produced with mucinex. Using incentive spirometer with better results. No fever.     Plan of care communicated with patient    Just a friendly reminder that you appointment is   Next 5 appointments (look out 90 days)    Aug 18, 2020  3:20 PM CDT  Office Visit with Felix Esquivel MD  [facility] ([facility]) [address]  [place] [id number] [phone]      .  We encourage you to keep this appointment.    Please remember to bring all of your pills in their bottles (including any vitamins or over the counter pills) with you to your appointment.   The patient indicates understanding of these issues and agrees with the plan of care.   Yes, plans to follow plan of care and keep the scheduled appointment.    Was the patient contacted within the 2 business days or other approved timeframe?  Yes    Was the Medication reconciliation and management done since the patient was discharged? Yes    Nu Humphrey RN  8/14/2020 1:56 PM      Clinic Care Coordination Contact  [facility]/Voicemail     Clinical Data: Care Coordinator Outreach  Outreach attempted x 1.  Left message on patient's voicemail with call back information and requested return call.  Plan:  Care Coordinator will attempt again.Nu Humphrey RN on 8/13/2020 at 1:40 PM

## 2020-08-18 ENCOUNTER — HOSPITAL ENCOUNTER (OUTPATIENT)
Dept: GENERAL RADIOLOGY | Facility: OTHER | Age: 65
End: 2020-08-18
Attending: INTERNAL MEDICINE
Payer: MEDICARE

## 2020-08-18 ENCOUNTER — OFFICE VISIT (OUTPATIENT)
Dept: INTERNAL MEDICINE | Facility: OTHER | Age: 65
End: 2020-08-18
Attending: INTERNAL MEDICINE
Payer: MEDICARE

## 2020-08-18 VITALS
HEART RATE: 88 BPM | DIASTOLIC BLOOD PRESSURE: 64 MMHG | TEMPERATURE: 99.5 F | SYSTOLIC BLOOD PRESSURE: 100 MMHG | BODY MASS INDEX: 27.08 KG/M2 | WEIGHT: 167.8 LBS | OXYGEN SATURATION: 97 % | RESPIRATION RATE: 16 BRPM

## 2020-08-18 DIAGNOSIS — C92.11 CHRONIC MYELOID LEUKEMIA IN REMISSION (H): ICD-10-CM

## 2020-08-18 DIAGNOSIS — J18.9 PNEUMONIA DUE TO INFECTIOUS ORGANISM, UNSPECIFIED LATERALITY, UNSPECIFIED PART OF LUNG: Primary | ICD-10-CM

## 2020-08-18 DIAGNOSIS — J18.9 PNEUMONIA DUE TO INFECTIOUS ORGANISM, UNSPECIFIED LATERALITY, UNSPECIFIED PART OF LUNG: ICD-10-CM

## 2020-08-18 PROBLEM — R79.89 D-DIMER, ELEVATED: Status: RESOLVED | Noted: 2020-08-07 | Resolved: 2020-08-18

## 2020-08-18 PROBLEM — D75.89 MACROCYTOSIS WITHOUT ANEMIA: Status: RESOLVED | Noted: 2017-11-21 | Resolved: 2020-08-18

## 2020-08-18 PROBLEM — N17.9 AKI (ACUTE KIDNEY INJURY) (H): Status: RESOLVED | Noted: 2020-08-07 | Resolved: 2020-08-18

## 2020-08-18 PROCEDURE — 71046 X-RAY EXAM CHEST 2 VIEWS: CPT

## 2020-08-18 PROCEDURE — G0463 HOSPITAL OUTPT CLINIC VISIT: HCPCS

## 2020-08-18 PROCEDURE — 99496 TRANSJ CARE MGMT HIGH F2F 7D: CPT | Performed by: INTERNAL MEDICINE

## 2020-08-18 SDOH — HEALTH STABILITY: MENTAL HEALTH: HOW MANY STANDARD DRINKS CONTAINING ALCOHOL DO YOU HAVE ON A TYPICAL DAY?: 1 OR 2

## 2020-08-18 SDOH — HEALTH STABILITY: MENTAL HEALTH: HOW OFTEN DO YOU HAVE A DRINK CONTAINING ALCOHOL?: 2-3 TIMES A WEEK

## 2020-08-18 ASSESSMENT — ENCOUNTER SYMPTOMS
ALLERGIC/IMMUNOLOGIC NEGATIVE: 1
CONSTITUTIONAL NEGATIVE: 1
ENDOCRINE NEGATIVE: 1
HEMATOLOGIC/LYMPHATIC NEGATIVE: 1

## 2020-08-18 ASSESSMENT — PAIN SCALES - GENERAL: PAINLEVEL: NO PAIN (0)

## 2020-08-18 NOTE — NURSING NOTE
"Chief Complaint   Patient presents with     [facility] f/u     Establish Care       Initial /64 (BP Location: Right arm, Patient Position: Sitting, Cuff Size: Adult Regular)   Pulse 88   Temp 99.5  F (37.5  C) (Tympanic)   Resp 16   Wt 76.1 kg (167 lb 12.8 oz)   SpO2 97%   BMI 27.08 kg/m   Estimated body mass index is 27.08 kg/m  as calculated from the following:    Height as of 8/7/20: 1.676 m (5' 6\").    Weight as of this encounter: 76.1 kg (167 lb 12.8 oz).  Medication Reconciliation: Completed     Quincy Martin LPN  "

## 2020-08-18 NOTE — PROGRESS NOTES
Chief Complaint: Hospital follow-up visit.    HPI: This patient is here for hospital follow-up visit.  She was hospitalized at [facility] from August 7 until August 12 with a diagnosis of probable aspiration pneumonia.  Clinical care coordination phone call was made on August 13.    This patient was hospitalized for approximately 5 days after having probable aspiration pneumonia.  She had nausea and vomiting as well as diarrhea and was quite sick.  She actually passed out for a short time and during that time she aspirated at home.  She was brought into the hospital and had a complete evaluation done in the emergency room.  This included a CT scan of the chest showing bilateral infiltrates.  Her d-dimer was extremely high so a CT for pulmonary embolism was done but that was negative.  She was admitted to the hospital and treated appropriately with IV fluids and IV antibiotics.  She was improved enough to be discharged home on oral antibiotics on August 12th.    Her past medical history is reviewed and updated today.  Past surgical history, family history, social history are all updated.  Medications are reconciled.  She has a history of chronic myelogenous leukemia and her only chronic medication is Gleevec.  She will be establishing care with Dr. Rosales in the upcoming months.    She currently feels better but she is still somewhat short of breath.  She has a dry cough but no fever.  She does not have any chest pain.  Hospital records are reviewed.  Hospital radiographic studies are reviewed.  She is uncertain as to whether or not she has ever had pneumonia shots.    Past Medical History:   Diagnosis Date     CML (chronic myelocytic leukemia) (H) 2003       Past Surgical History:   Procedure Laterality Date     AS BLEPHAROPLASTY UPPER LID W EXCESS SKIN Bilateral      BUNIONECTOMY Left 2019     COLONOSCOPY  2011     CYSTECTOMY OVARIAN BENIGN       IR PORT CHECK RIGHT  8/7/2020     OPEN REDUCTION INTERNAL  FIXATION FOOT Right 01/2020    Paul's fracture       No Known Allergies    Current Outpatient Medications   Medication Sig Dispense Refill     imatinib (GLEEVEC) 400 MG TABS tablet Take 400 mg by mouth daily         Social History     Socioeconomic History     Marital status:      Spouse name: Not on file     Number of children: Not on file     Years of education: Not on file     Highest education level: Not on file   Occupational History     Not on file   Social Needs     Financial resource strain: Not on file     Food insecurity     Worry: Not on file     Inability: Not on file     Transportation needs     Medical: Not on file     Non-medical: Not on file   Tobacco Use     Smoking status: Never Smoker     Smokeless tobacco: Never Used   Substance and Sexual Activity     Alcohol use: Yes     Frequency: 2-3 times a week     Drinks per session: 1 or 2     Comment: couple per week     Drug use: Never     Sexual activity: Not on file   Lifestyle     Physical activity     Days per week: Not on file     Minutes per session: Not on file     Stress: Not on file   Relationships     Social connections     Talks on phone: Not on file     Gets together: Not on file     Attends [employer] service: Not on file     Active member of club or organization: Not on file     Attends meetings of clubs or organizations: Not on file     Relationship status: Not on file     Intimate partner violence     Fear of current or ex partner: Not on file     Emotionally abused: Not on file     Physically abused: Not on file     Forced sexual activity: Not on file   Other Topics Concern     Not on file   Social History Narrative    , moving from [place] to [facility].  Worked as  for [employer]ach[employer] in [place].       Review of Systems   Constitutional: Negative.    Endocrine: Negative.    Skin: Negative.    Allergic/Immunologic: Negative.    Hematological: Negative.        Physical Exam  Vitals signs and nursing note  reviewed.   Constitutional:       General: She is not in acute distress.     Appearance: Normal appearance. She is not ill-appearing, toxic-appearing or diaphoretic.   Cardiovascular:      Rate and Rhythm: Normal rate and regular rhythm.      Heart sounds: Normal heart sounds.   Pulmonary:      Effort: Pulmonary effort is normal.      Breath sounds: Rhonchi present. No decreased breath sounds, wheezing or rales.   Neurological:      Mental Status: She is alert.         Assessment:      ICD-10-CM    1. Pneumonia due to infectious organism, unspecified laterality, unspecified part of lung  J18.9 XR Chest 2 Views   2. Chronic myeloid leukemia in remission (H)  C92.11        Plan: Her chest x-ray shows some persistent infiltrates especially at the bases and may be a little bit of an effusion on the left.  Overall, I think this is improved compared to her CT scan, however.  I would expect this will continue to improve with time.  Therefore, continued watchful waiting.  Reassurance provided.  I will have her back in 3 weeks for reassessment as well as likely a repeat chest x-ray.  Consultation with Dr. Rosales is rescheduled for November, lab work is ordered prior to that appointment as well.

## 2020-09-08 ENCOUNTER — OFFICE VISIT (OUTPATIENT)
Dept: INTERNAL MEDICINE | Facility: OTHER | Age: 65
End: 2020-09-08
Attending: INTERNAL MEDICINE
Payer: MEDICARE

## 2020-09-08 ENCOUNTER — HOSPITAL ENCOUNTER (OUTPATIENT)
Dept: GENERAL RADIOLOGY | Facility: OTHER | Age: 65
End: 2020-09-08
Attending: INTERNAL MEDICINE
Payer: MEDICARE

## 2020-09-08 VITALS
OXYGEN SATURATION: 99 % | BODY MASS INDEX: 26.47 KG/M2 | SYSTOLIC BLOOD PRESSURE: 112 MMHG | RESPIRATION RATE: 16 BRPM | DIASTOLIC BLOOD PRESSURE: 70 MMHG | HEART RATE: 80 BPM | WEIGHT: 164 LBS | TEMPERATURE: 97.7 F

## 2020-09-08 DIAGNOSIS — J18.9 PNEUMONIA DUE TO INFECTIOUS ORGANISM, UNSPECIFIED LATERALITY, UNSPECIFIED PART OF LUNG: Primary | ICD-10-CM

## 2020-09-08 DIAGNOSIS — Z12.31 ENCOUNTER FOR SCREENING MAMMOGRAM FOR BREAST CANCER: ICD-10-CM

## 2020-09-08 DIAGNOSIS — J18.9 PNEUMONIA DUE TO INFECTIOUS ORGANISM, UNSPECIFIED LATERALITY, UNSPECIFIED PART OF LUNG: ICD-10-CM

## 2020-09-08 DIAGNOSIS — Z78.0 POST-MENOPAUSAL: ICD-10-CM

## 2020-09-08 PROCEDURE — 99213 OFFICE O/P EST LOW 20 MIN: CPT | Performed by: INTERNAL MEDICINE

## 2020-09-08 PROCEDURE — G0009 ADMIN PNEUMOCOCCAL VACCINE: HCPCS

## 2020-09-08 PROCEDURE — 90670 PCV13 VACCINE IM: CPT

## 2020-09-08 PROCEDURE — G0463 HOSPITAL OUTPT CLINIC VISIT: HCPCS | Mod: 25

## 2020-09-08 PROCEDURE — G0463 HOSPITAL OUTPT CLINIC VISIT: HCPCS

## 2020-09-08 PROCEDURE — 90471 IMMUNIZATION ADMIN: CPT

## 2020-09-08 PROCEDURE — 71046 X-RAY EXAM CHEST 2 VIEWS: CPT

## 2020-09-08 ASSESSMENT — ENCOUNTER SYMPTOMS
EYES NEGATIVE: 1
ENDOCRINE NEGATIVE: 1
CONSTITUTIONAL NEGATIVE: 1
ALLERGIC/IMMUNOLOGIC NEGATIVE: 1

## 2020-09-08 ASSESSMENT — PAIN SCALES - GENERAL: PAINLEVEL: NO PAIN (0)

## 2020-09-08 NOTE — NURSING NOTE
"Chief Complaint   Patient presents with     Follow Up     Pneumonia       Initial /70 (BP Location: Right arm, Patient Position: Sitting, Cuff Size: Adult Regular)   Pulse 80   Temp 97.7  F (36.5  C) (Tympanic)   Resp 16   Wt 74.4 kg (164 lb)   SpO2 99%   BMI 26.47 kg/m   Estimated body mass index is 26.47 kg/m  as calculated from the following:    Height as of 8/7/20: 1.676 m (5' 6\").    Weight as of this encounter: 74.4 kg (164 lb).  Medication Reconciliation: complete    Edie Quintanilla LPN  "

## 2020-09-08 NOTE — PROGRESS NOTES
Chief Complaint:  F/U pneumonia.    HPI: See previous note.  She had a pretty significant pneumonia, likely from aspiration and was hospitalized for a number of days.  This was probably an aspiration pneumonia.  She is feeling much better today.  She still has a little bit of a cough but does not have any phlegm and is not short of breath.  No fever.  She really feels as though she is improving in all aspects.    Past Medical History:   Diagnosis Date     CML (chronic myelocytic leukemia) (H) 2003       Past Surgical History:   Procedure Laterality Date     AS BLEPHAROPLASTY UPPER LID W EXCESS SKIN Bilateral      BUNIONECTOMY Left 2019     COLONOSCOPY  2011     CYSTECTOMY OVARIAN BENIGN       IR PORT CHECK RIGHT  8/7/2020     OPEN REDUCTION INTERNAL FIXATION FOOT Right 01/2020    Paul's fracture       No Known Allergies    Current Outpatient Medications   Medication Sig Dispense Refill     imatinib (GLEEVEC) 400 MG TABS tablet Take 400 mg by mouth daily         Review of Systems   Constitutional: Negative.    Eyes: Negative.    Endocrine: Negative.    Allergic/Immunologic: Negative.        Physical Exam  Vitals signs and nursing note reviewed.   Constitutional:       General: She is not in acute distress.     Appearance: Normal appearance. She is not ill-appearing, toxic-appearing or diaphoretic.   Cardiovascular:      Rate and Rhythm: Normal rate and regular rhythm.      Heart sounds: Normal heart sounds.   Pulmonary:      Effort: Pulmonary effort is normal.      Breath sounds: Normal breath sounds. No wheezing, rhonchi or rales.   Neurological:      Mental Status: She is alert.         Assessment:        ICD-10-CM    1. Pneumonia due to infectious organism, unspecified laterality, unspecified part of lung  J18.9 XR Chest 2 Views       Plan: Her chest x-ray is back to normal.  This was reviewed with her.  Prevnar given today, consider Pneumovax in 8 weeks.  I also encouraged her to get the Shingrix series.  She  will consider the flu shot.  Mammogram and bone density scheduled, I will let her know the results.

## 2020-11-04 DIAGNOSIS — C92.11 CHRONIC MYELOID LEUKEMIA IN REMISSION (H): ICD-10-CM

## 2020-11-04 LAB
ALBUMIN SERPL-MCNC: 4.2 G/DL (ref 3.5–5.7)
ALP SERPL-CCNC: 72 U/L (ref 34–104)
ALT SERPL W P-5'-P-CCNC: 15 U/L (ref 7–52)
ANION GAP SERPL CALCULATED.3IONS-SCNC: 7 MMOL/L (ref 3–14)
AST SERPL W P-5'-P-CCNC: 24 U/L (ref 13–39)
BILIRUB SERPL-MCNC: 0.4 MG/DL (ref 0.3–1)
BUN SERPL-MCNC: 16 MG/DL (ref 7–25)
CALCIUM SERPL-MCNC: 9.3 MG/DL (ref 8.6–10.3)
CHLORIDE SERPL-SCNC: 105 MMOL/L (ref 98–107)
CO2 SERPL-SCNC: 29 MMOL/L (ref 21–31)
CREAT SERPL-MCNC: 1.26 MG/DL (ref 0.6–1.2)
ERYTHROCYTE [DISTWIDTH] IN BLOOD BY AUTOMATED COUNT: 14.6 % (ref 10–15)
GFR SERPL CREATININE-BSD FRML MDRD: 42 ML/MIN/{1.73_M2}
GLUCOSE SERPL-MCNC: 95 MG/DL (ref 70–105)
HCT VFR BLD AUTO: 36.3 % (ref 35–47)
HGB BLD-MCNC: 12 G/DL (ref 11.7–15.7)
MCH RBC QN AUTO: 33.9 PG (ref 26.5–33)
MCHC RBC AUTO-ENTMCNC: 33.1 G/DL (ref 31.5–36.5)
MCV RBC AUTO: 103 FL (ref 78–100)
PLATELET # BLD AUTO: 227 10E9/L (ref 150–450)
POTASSIUM SERPL-SCNC: 3.9 MMOL/L (ref 3.5–5.1)
PROT SERPL-MCNC: 7 G/DL (ref 6.4–8.9)
RBC # BLD AUTO: 3.54 10E12/L (ref 3.8–5.2)
SODIUM SERPL-SCNC: 141 MMOL/L (ref 134–144)
WBC # BLD AUTO: 4 10E9/L (ref 4–11)

## 2020-11-04 PROCEDURE — 36415 COLL VENOUS BLD VENIPUNCTURE: CPT | Mod: ZL | Performed by: INTERNAL MEDICINE

## 2020-11-04 PROCEDURE — 85027 COMPLETE CBC AUTOMATED: CPT | Mod: ZL | Performed by: INTERNAL MEDICINE

## 2020-11-04 PROCEDURE — 80053 COMPREHEN METABOLIC PANEL: CPT | Mod: ZL | Performed by: INTERNAL MEDICINE

## 2020-11-04 PROCEDURE — 81206 BCR/ABL1 GENE MAJOR BP: CPT | Mod: ZL | Performed by: INTERNAL MEDICINE

## 2020-11-04 PROCEDURE — G0452 MOLECULAR PATHOLOGY INTERPR: HCPCS | Mod: 26 | Performed by: PATHOLOGY

## 2020-11-06 LAB — COPATH REPORT: NORMAL

## 2020-11-11 ENCOUNTER — HOSPITAL ENCOUNTER (OUTPATIENT)
Dept: MAMMOGRAPHY | Facility: OTHER | Age: 65
End: 2020-11-11
Attending: INTERNAL MEDICINE
Payer: MEDICARE

## 2020-11-11 ENCOUNTER — HOSPITAL ENCOUNTER (OUTPATIENT)
Dept: BONE DENSITY | Facility: OTHER | Age: 65
End: 2020-11-11
Attending: INTERNAL MEDICINE
Payer: MEDICARE

## 2020-11-11 ENCOUNTER — ONCOLOGY VISIT (OUTPATIENT)
Dept: ONCOLOGY | Facility: OTHER | Age: 65
End: 2020-11-11
Attending: INTERNAL MEDICINE
Payer: MEDICARE

## 2020-11-11 VITALS
OXYGEN SATURATION: 99 % | DIASTOLIC BLOOD PRESSURE: 60 MMHG | BODY MASS INDEX: 26.68 KG/M2 | SYSTOLIC BLOOD PRESSURE: 110 MMHG | RESPIRATION RATE: 14 BRPM | WEIGHT: 166 LBS | HEIGHT: 66 IN | HEART RATE: 94 BPM | TEMPERATURE: 98.6 F

## 2020-11-11 DIAGNOSIS — C92.11 CHRONIC MYELOID LEUKEMIA IN REMISSION (H): Primary | ICD-10-CM

## 2020-11-11 DIAGNOSIS — Z78.0 POST-MENOPAUSAL: ICD-10-CM

## 2020-11-11 DIAGNOSIS — Z12.31 ENCOUNTER FOR SCREENING MAMMOGRAM FOR BREAST CANCER: ICD-10-CM

## 2020-11-11 PROCEDURE — G0463 HOSPITAL OUTPT CLINIC VISIT: HCPCS

## 2020-11-11 PROCEDURE — 77080 DXA BONE DENSITY AXIAL: CPT

## 2020-11-11 PROCEDURE — 99204 OFFICE O/P NEW MOD 45 MIN: CPT | Performed by: INTERNAL MEDICINE

## 2020-11-11 PROCEDURE — 77067 SCR MAMMO BI INCL CAD: CPT

## 2020-11-11 PROCEDURE — G0463 HOSPITAL OUTPT CLINIC VISIT: HCPCS | Mod: 25

## 2020-11-11 RX ORDER — IMATINIB MESYLATE 400 MG/1
400 TABLET, FILM COATED ORAL
COMMUNITY
Start: 2020-10-19 | End: 2020-11-11

## 2020-11-11 ASSESSMENT — MIFFLIN-ST. JEOR: SCORE: 1309.47

## 2020-11-11 ASSESSMENT — PAIN SCALES - GENERAL: PAINLEVEL: MILD PAIN (3)

## 2020-11-11 NOTE — NURSING NOTE
"Patient present for consult leukemia. receives Gleevac from [facility].  Chief Complaint   Patient presents with     Consult     Leukemia       Initial /60   Pulse 94   Temp 98.6  F (37  C)   Resp 14   Ht 1.676 m (5' 5.98\")   Wt 75.3 kg (166 lb)   SpO2 99%   BMI 26.81 kg/m   Estimated body mass index is 26.81 kg/m  as calculated from the following:    Height as of this encounter: 1.676 m (5' 5.98\").    Weight as of this encounter: 75.3 kg (166 lb).  Medication Reconciliation: complete    Debora Freeman, RN  3   "

## 2020-11-11 NOTE — PROGRESS NOTES
Visit Date:   11/11/2020      REASON FOR CONSULTATION:  Chronic myelogenous leukemia.      REQUESTING PHYSICIAN:  Felix Esquivel MD      HISTORY OF PRESENT ILLNESS:  Ms. Alison Norwood is a 65-year-old white female who, were asked to evaluate concerning her diagnosis of chronic myelogenous leukemia.  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  She was treated initially with Hydrea by Dr. Asad Escobar at [facility] and then placed on Gleevec room at and imatinib 400 mg p.o. daily.  She had been followed since then by Raine santizo and the nurse practitioner in Hematology/Oncology in [facility].  Now has moved to the [facility].  We would like to establish care with us.  She says she has been in hematologic, cytogenetic and molecular remission over the past 15 years.  She has tolerated imatinib well.  Initially, she had problems with diarrhea.  She was switched to generic Gleevec and then symptoms seemed to improve, but she occasionally gets bouts of nausea and diarrhea.  She says this is not worse in the recent past.  She did have a bout in August.  Otherwise, she is asymptomatic.  She denies any fevers, night sweats, weight loss, abdominal pain, chest pain, headaches, bone pain.      PAST MEDICAL HISTORY:  Significant for esophageal motility disorder, history of pneumonia,  family history of colon cancer.      ALLERGIES:  SHE HAS NO KNOWN DRUG ALLERGIES.      MEDICATIONS:  Include imatinib or Gleevec 400 mg daily.      SOCIAL HISTORY:  Tobacco negative.  Alcohol is negative.  She worked for a chiropractor in the past, now retired, building a [place] home with her  in the [facility].      FAMILY HISTORY:  Noncontributory.      REVIEW OF SYSTEMS:   CENTRAL NERVOUS SYSTEM:  Negative for headaches, change in mental  status.   ENT:  Negative for hearing loss.   RESPIRATORY:  Negative for cough, shortness of breath, hemoptysis.   CARDIAC:  Negative chest pain, positive palpitations, orthopnea, PND, ankle edema.   GASTROINTESTINAL:  Negative for change in bowel habits, bright red blood per rectum, hematemesis.  He does get occasional loose stools with associated nausea.   MUSCULOSKELETAL:  Unremarkable.   GENITOURINARY:  Negative for nocturia, urgency, frequency, hematuria.   CONSTITUTIONAL:  Negative fevers, night sweats, weight loss.   HEMATOLOGIC:  Negative for easy bruisability, gingival bleeding, epistaxis.      PHYSICAL EXAMINATION:   GENERAL:  She is a well-developed, well-nourished middle-aged white female in no acute distress.   VITAL SIGNS:  Blood pressure 110/60, pulse 94, respirations 14, temperature 98.6.   HEENT:  Unremarkable.   NECK:  Supple.   LUNGS:  Clear to auscultation and percussion.   HEART:  Regular rhythm, S1, S2 normal.   ABDOMEN:  Soft, normoactive bowel sounds.  Spleen tip is palpable approximately 1 fingerbreadth below the left costal margin.  No mass, nontender.   LYMPHATICS:  No cervical, supraclavicular, axillary or inguinal nodes.   EXTREMITIES:  No edema.   NEUROLOGIC:  Nonfocal.      LABORATORY DATA:  Laboratories reveal CBC with white count 4.0, H and H 12.0 and 36.3, platelet count is 227.  BCR ABL transcript revealed that the major p210 is undetectable.  BUN 16, creatinine 1.26.  LFTs are normal.      IMPRESSION:     1.  Chronic myelogenous leukemia diagnosed in 2003 and currently with molecular hematologic and cytogenetic remission on imatinib 400 mg daily.  The plan is to continue surveillance.  We will see the patient in 6 months.  Obtain CBC, CMP, LDH, and BCR-ABL transcript.   2.  Diarrhea, likely due to imatinib, but if symptoms worsened.  She will need to obtain CT of the abdomen and pelvis, but for now, no need for workup, otherwise, she will follow-up with Dr. Esquivel for general  primary medical followup.         FOUZIA LAMAS MD             D: 2020   T: 2020   MT:       Name:     EDDIE MCGRAW   MRN:      [id number]        Account:      [id number]   :      1955           Visit Date:   2020      Document: [id number]       cc: Felix Esquivel MD

## 2020-11-13 ENCOUNTER — TELEPHONE (OUTPATIENT)
Dept: ONCOLOGY | Facility: OTHER | Age: 65
End: 2020-11-13

## 2021-05-01 ENCOUNTER — HEALTH MAINTENANCE LETTER (OUTPATIENT)
Age: 66
End: 2021-05-01

## 2021-05-03 ENCOUNTER — TRANSFERRED RECORDS (OUTPATIENT)
Dept: HEALTH INFORMATION MANAGEMENT | Facility: OTHER | Age: 66
End: 2021-05-03

## 2021-05-04 DIAGNOSIS — C92.11 CHRONIC MYELOID LEUKEMIA IN REMISSION (H): ICD-10-CM

## 2021-05-04 LAB
ALBUMIN SERPL-MCNC: 4.3 G/DL (ref 3.5–5.7)
ALP SERPL-CCNC: 69 U/L (ref 34–104)
ALT SERPL W P-5'-P-CCNC: 16 U/L (ref 7–52)
ANION GAP SERPL CALCULATED.3IONS-SCNC: 8 MMOL/L (ref 3–14)
AST SERPL W P-5'-P-CCNC: 25 U/L (ref 13–39)
BASOPHILS # BLD AUTO: 0 10E9/L (ref 0–0.2)
BASOPHILS NFR BLD AUTO: 1.2 %
BILIRUB SERPL-MCNC: 0.4 MG/DL (ref 0.3–1)
BUN SERPL-MCNC: 17 MG/DL (ref 7–25)
CALCIUM SERPL-MCNC: 9.6 MG/DL (ref 8.6–10.3)
CHLORIDE SERPL-SCNC: 102 MMOL/L (ref 98–107)
CO2 SERPL-SCNC: 29 MMOL/L (ref 21–31)
CREAT SERPL-MCNC: 1.01 MG/DL (ref 0.6–1.2)
DIFFERENTIAL METHOD BLD: ABNORMAL
EOSINOPHIL # BLD AUTO: 0.1 10E9/L (ref 0–0.7)
EOSINOPHIL NFR BLD AUTO: 2.2 %
ERYTHROCYTE [DISTWIDTH] IN BLOOD BY AUTOMATED COUNT: 13.4 % (ref 10–15)
GFR SERPL CREATININE-BSD FRML MDRD: 55 ML/MIN/{1.73_M2}
GLUCOSE SERPL-MCNC: 82 MG/DL (ref 70–105)
HCT VFR BLD AUTO: 33.5 % (ref 35–47)
HGB BLD-MCNC: 11.6 G/DL (ref 11.7–15.7)
IMM GRANULOCYTES # BLD: 0 10E9/L (ref 0–0.4)
IMM GRANULOCYTES NFR BLD: 0.3 %
LDH SERPL L TO P-CCNC: 233 U/L (ref 140–271)
LYMPHOCYTES # BLD AUTO: 1.3 10E9/L (ref 0.8–5.3)
LYMPHOCYTES NFR BLD AUTO: 40.4 %
MCH RBC QN AUTO: 35.7 PG (ref 26.5–33)
MCHC RBC AUTO-ENTMCNC: 34.6 G/DL (ref 31.5–36.5)
MCV RBC AUTO: 103 FL (ref 78–100)
MONOCYTES # BLD AUTO: 0.4 10E9/L (ref 0–1.3)
MONOCYTES NFR BLD AUTO: 12 %
NEUTROPHILS # BLD AUTO: 1.4 10E9/L (ref 1.6–8.3)
NEUTROPHILS NFR BLD AUTO: 43.9 %
PLATELET # BLD AUTO: 226 10E9/L (ref 150–450)
POTASSIUM SERPL-SCNC: 4 MMOL/L (ref 3.5–5.1)
PROT SERPL-MCNC: 7.1 G/DL (ref 6.4–8.9)
RBC # BLD AUTO: 3.25 10E12/L (ref 3.8–5.2)
SODIUM SERPL-SCNC: 139 MMOL/L (ref 134–144)
WBC # BLD AUTO: 3.2 10E9/L (ref 4–11)

## 2021-05-04 PROCEDURE — 36415 COLL VENOUS BLD VENIPUNCTURE: CPT | Mod: ZL | Performed by: INTERNAL MEDICINE

## 2021-05-04 PROCEDURE — 81206 BCR/ABL1 GENE MAJOR BP: CPT | Mod: ZL | Performed by: INTERNAL MEDICINE

## 2021-05-04 PROCEDURE — 80053 COMPREHEN METABOLIC PANEL: CPT | Mod: ZL | Performed by: INTERNAL MEDICINE

## 2021-05-04 PROCEDURE — 83615 LACTATE (LD) (LDH) ENZYME: CPT | Mod: ZL | Performed by: INTERNAL MEDICINE

## 2021-05-04 PROCEDURE — G0452 MOLECULAR PATHOLOGY INTERPR: HCPCS | Mod: 26 | Performed by: PATHOLOGY

## 2021-05-04 PROCEDURE — 85025 COMPLETE CBC W/AUTO DIFF WBC: CPT | Mod: ZL | Performed by: INTERNAL MEDICINE

## 2021-05-07 LAB — COPATH REPORT: NORMAL

## 2021-05-11 ENCOUNTER — ONCOLOGY VISIT (OUTPATIENT)
Dept: ONCOLOGY | Facility: OTHER | Age: 66
End: 2021-05-11
Attending: NURSE PRACTITIONER
Payer: MEDICARE

## 2021-05-11 VITALS
TEMPERATURE: 97.7 F | SYSTOLIC BLOOD PRESSURE: 128 MMHG | HEIGHT: 65 IN | RESPIRATION RATE: 18 BRPM | DIASTOLIC BLOOD PRESSURE: 66 MMHG | BODY MASS INDEX: 27.32 KG/M2 | WEIGHT: 164 LBS | HEART RATE: 94 BPM | OXYGEN SATURATION: 99 %

## 2021-05-11 DIAGNOSIS — C92.11 CHRONIC MYELOID LEUKEMIA IN REMISSION (H): Primary | ICD-10-CM

## 2021-05-11 DIAGNOSIS — N83.201 CYST OF RIGHT OVARY: ICD-10-CM

## 2021-05-11 LAB
BASOPHILS # BLD AUTO: 0 10E9/L (ref 0–0.2)
BASOPHILS NFR BLD AUTO: 0.8 %
DIFFERENTIAL METHOD BLD: ABNORMAL
EOSINOPHIL # BLD AUTO: 0 10E9/L (ref 0–0.7)
EOSINOPHIL NFR BLD AUTO: 1 %
ERYTHROCYTE [DISTWIDTH] IN BLOOD BY AUTOMATED COUNT: 13.8 % (ref 10–15)
HCT VFR BLD AUTO: 34.2 % (ref 35–47)
HGB BLD-MCNC: 11.7 G/DL (ref 11.7–15.7)
IMM GRANULOCYTES # BLD: 0 10E9/L (ref 0–0.4)
IMM GRANULOCYTES NFR BLD: 0 %
LYMPHOCYTES # BLD AUTO: 1.5 10E9/L (ref 0.8–5.3)
LYMPHOCYTES NFR BLD AUTO: 37.8 %
MCH RBC QN AUTO: 35.5 PG (ref 26.5–33)
MCHC RBC AUTO-ENTMCNC: 34.2 G/DL (ref 31.5–36.5)
MCV RBC AUTO: 104 FL (ref 78–100)
MONOCYTES # BLD AUTO: 0.4 10E9/L (ref 0–1.3)
MONOCYTES NFR BLD AUTO: 11 %
NEUTROPHILS # BLD AUTO: 2 10E9/L (ref 1.6–8.3)
NEUTROPHILS NFR BLD AUTO: 49.4 %
PLATELET # BLD AUTO: 232 10E9/L (ref 150–450)
RBC # BLD AUTO: 3.3 10E12/L (ref 3.8–5.2)
WBC # BLD AUTO: 4 10E9/L (ref 4–11)

## 2021-05-11 PROCEDURE — 85025 COMPLETE CBC W/AUTO DIFF WBC: CPT | Mod: ZL | Performed by: NURSE PRACTITIONER

## 2021-05-11 PROCEDURE — G0463 HOSPITAL OUTPT CLINIC VISIT: HCPCS

## 2021-05-11 PROCEDURE — 36415 COLL VENOUS BLD VENIPUNCTURE: CPT | Mod: ZL | Performed by: NURSE PRACTITIONER

## 2021-05-11 PROCEDURE — 99215 OFFICE O/P EST HI 40 MIN: CPT | Performed by: NURSE PRACTITIONER

## 2021-05-11 RX ORDER — VALACYCLOVIR HYDROCHLORIDE 1 G/1
TABLET, FILM COATED ORAL
COMMUNITY
Start: 2021-03-12 | End: 2024-03-14

## 2021-05-11 RX ORDER — FAMOTIDINE 40 MG/1
40 TABLET, FILM COATED ORAL DAILY PRN
COMMUNITY
Start: 2019-10-23 | End: 2024-03-14

## 2021-05-11 RX ORDER — CRANBERRY FRUIT EXTRACT 200 MG
1 CAPSULE ORAL DAILY
COMMUNITY
End: 2024-03-14

## 2021-05-11 ASSESSMENT — MIFFLIN-ST. JEOR: SCORE: 1279.78

## 2021-05-11 ASSESSMENT — PAIN SCALES - GENERAL: PAINLEVEL: MODERATE PAIN (4)

## 2021-05-11 NOTE — NURSING NOTE
"Chief Complaint   Patient presents with     RECHECK     CML       Initial /66   Pulse 94   Temp 97.7  F (36.5  C) (Tympanic)   Resp 18   Ht 1.651 m (5' 5\")   Wt 74.4 kg (164 lb)   SpO2 99%   BMI 27.29 kg/m   Estimated body mass index is 27.29 kg/m  as calculated from the following:    Height as of this encounter: 1.651 m (5' 5\").    Weight as of this encounter: 74.4 kg (164 lb).  Medication Reconciliation: complete    Yumiko Cruz CMA (AAMA)  "

## 2021-05-11 NOTE — PROGRESS NOTES
Oncology Follow-up Visit:  May 11, 2021  Diagnosis:Chronic myelogenous leukemia    History Of Present Illness:  Patient here for follow up of her CML. Patient was initially seen on 11/11/20  to evaluate concerning her diagnosis of chronic myelogenous leukemia.  Patient was initially diagnosed in 2003. 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  She was treated initially with Hydrea by Dr. Asad Escobar at [facility] and then placed on Gleevec room at and imatinib 400 mg p.o. daily.  She had been followed since then by the nurse practitioner in Hematology/Oncology in [facility].  Now has moved to the [facility]. Patient states she has been in hematologic, cytogenetic and molecular remission over the past 15 years.  She has tolerated imatinib well.  Initially, she had problems with diarrhea.  She was switched to generic Gleevec and then symptoms seemed to improve, but she occasionally gets bouts of nausea and diarrhea. Patient states she has had some back pain and had a MRI at [facility]. 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  She was also noted to have a possible cyst on the right ovary. Patient does report some RLQ abdominal tenderness. She has some occasional diarrhea. Patient continues on the Gleevec 400mg daily. Labs done on 5/4/21 show a decreased WBC of 3.2, hemoglobin is 11.6, platelets are normal. BCR ABL1 is undetectable. All other labs are stable. Patient has no other new concerns at this time.    Review Of Systems:  Review Of Systems  Eyes/Ears/Nose/Throat: denies new vision or hearing changes  Respiratory: No shortness of breath, dyspnea on exertion, cough, or hemoptysis  Cardiovascular: denies chest pain or palpitations  Gastrointestinal: reports occasional  "diarrhea, RLQ abdominal tenderness,  no bowel changes  Genitourinary: denies dysuria or hematuria  Musculoskeletal:reports ongoing back pain, no new bone pain  Neurologic: denies headaches, no dizziness  Hematologic/Lymphatic/Immunologic: denies fevers, chills, night sweats      There are no exam notes on file for this visit.    Past medical, social, surgical, and family histories reviewed.    Allergies:  Allergies as of 05/11/2021     (No Known Allergies)       Current Medications:  Current Outpatient Medications   Medication Sig Dispense Refill     Calcium-Magnesium-Zinc 333-133-5 MG TABS per tablet Take 1 tablet by mouth daily       cholecalciferol 50 MCG (2000 UT) tablet Take 2,000 Units by mouth daily       imatinib (GLEEVEC) 400 MG TABS tablet Take 1 tablet (400 mg) by mouth daily 30 tablet 6     Probiotic Product (ACIDOPHILUS PROBIOTIC BLEND) CAPS Take 1 capsule by mouth daily       famotidine (PEPCID) 40 MG tablet Take 40 mg by mouth daily as needed       valACYclovir (VALTREX) 1000 mg tablet TAKE 2 TABLETS BY MOUTH AT BREAKOUT, THEN TAKE 2 MORE TABLETS 12 HOURS LATER          Physical Exam:  /66   Pulse 94   Temp 97.7  F (36.5  C) (Tympanic)   Resp 18   Ht 1.651 m (5' 5\")   Wt 74.4 kg (164 lb)   SpO2 99%   BMI 27.29 kg/m      GENERAL APPEARANCE: 66 year old female, alert and no distress     NECK: no adenopathy, no asymmetry or masses     LYMPHATICS: No cervical, supraclavicular, axillary lymphadenopathy     RESP: lungs clear to auscultation - no rales, rhonchi or wheezes     CARDIOVASCULAR: regular rates and rhythm, normal S1 S2     ABDOMEN: tenderness to palpation of RLQ,  bowel sounds normal     MUSCULOSKELETAL: extremities normal- no gross deformities noted, No edema b/l LE.     SKIN: no suspicious lesions or rashes on exposed skin     PSYCHIATRIC: mentation appears normal and affect normal    Laboratory/Imaging Studies  Orders Only on 05/04/2021   Component Date Value Ref Range Status     " Copath Report 05/04/2021    Final                    Value:Patient Name: EDDIE MCGRAW  MR#: [id number]  Specimen #: [id number]  Collected: 5/4/2021 10:26  Received: 5/5/2021 08:26  Reported: 5/7/2021 18:16  Ordering Phy(s): FOUZIA LAMAS  Additional Phy(s): REGINA MATTHEW    For improved result formatting, select 'View Enhanced Report Format' under   Linked Documents section.  _________________________________________    TEST(S) REQUESTED:  BCR-ABL,CMLQuantitative Major    SPECIMEN DESCRIPTION:  BLOOD    BCR-ABL1 MAJOR(p210) QUANTITATION RESULTS:    BCR-ABL1/ABL1 Major(p210):      UNDETECTABLE    INTERNAL CONTROL (NUMBER OF ABL1 TRANSCRIPTS): 15,400    INTERPRETATION:  Molecular testing performed on submitted Blood.    BCR-ABL1 transcripts of the major (p210) breakpoint cluster regions are   undetectable in this sample.    COMMENTS:  The limit of sensitivity of the quantitative BCR-ABL1 major (p210) assay   is 10 copies of the BCR-ABL  transcripts.  Individual measurements of BCR-ABL1 transcript levels yield limited   prognostic informat                          ion; serial monitoring  of BCR-ABL1 transcript levels (at 3 or 6 month intervals) allows for   clinically relevant assessment of  response to therapy at the molecular level. A major molecular response has   been defined as a 3-log decrease in  BCR-ABL1/ABL1 from a baseline value. The patient's own BCR-ABL1/ABL1 ratio   performed by the same laboratory  serves as the definitive baseline from which a three log decrease is   measured. However, in practice this value  is not always available. Therefore the concept of the laboratory-specific   mean pre-treatment baseline value  has been established. In our laboratory, the mean pre-treatment   BCR-ABL1/ABL1 in CML patients is 122% (range:  68% to 217%). Therefore, a three log reduction from the mean pre-treatment   baseline in our laboratory is a  BCR-ABL1/ABL1 value of 0.1%.    METHODOLOGY: Total cellular  RNA was extracted from above specimen and   reverse transcribed to cDNA via random  hexamer priming. The cDNA was amplified by a real timothy                          e quantitative PCR   assay (using an LAURO 7900HT Real-time  analyzer) with an ABL1 specific primer and a primer specific for exon 13   of the BCR gene. An internal control  (ABL1 gene), was amplified to confirm the presence of patient RNA. The   presence of at least 1000 copies of the  ABL1 gene was considered a cutoff value for determining an adequate   specimen for quantitative analysis of the  BCR-ABL1 translocation. The results are reported as a ratio of the number   of BCR-ABL1 copies to the number of  ABL1 copies to normalize for differences in amount of RNA in the reaction   and enable serial monitoring of the  BCR-ABL1 transcript numbers.  This test has been calibrated to the   international scale(IS) using a validated  reference standard.    References:  N Engl J Med 2003; 17:2318, Int J Oncol 2006;28:1099, Blood   2007;108:28    Please note, comparisons of bone marrow and blood samples may not be   clinically appropriate.    This test was developed and its performance characte                          ristics determined by   [facility]. It has not been cleared or approved by the FDA.   The laboratory is regulated under CLIA  as qualified to perform high-complexity testing. This test is used for   clinical purposes. It should not be  regarded as investigational or for research.    A resident/fellow in an accredited training program was involved in the   selection of testing, review of  laboratory data, and/or interpretation of this case.  I, as the senior   physician, attest that I: (i) confirmed  appropriate testing, (ii) examined the relevant raw data for the   specimen(s); and (iii) rendered or confirmed  the interpretation(s).    Electronically Signed Out By:  Kayy Funk M.D., PhD  [facility]    CPT  Codes:  A: -QYRASP, 42683-CYLZAYIA    TESTING LAB LOCATION:  [place] [id number] [facility] [address] [place] [id number] [phone]    COLLECTION SITE:  Client:  [facility]  Location:  [facility] (B)       Lactate Dehydrogenase 05/04/2021 233  140 - 271 U/L Final     Sodium 05/04/2021 139  134 - 144 mmol/L Final     Potassium 05/04/2021 4.0  3.5 - 5.1 mmol/L Final     Chloride 05/04/2021 102  98 - 107 mmol/L Final     Carbon Dioxide 05/04/2021 29  21 - 31 mmol/L Final     Anion Gap 05/04/2021 8  3 - 14 mmol/L Final     Glucose 05/04/2021 82  70 - 105 mg/dL Final     Urea Nitrogen 05/04/2021 17  7 - 25 mg/dL Final     Creatinine 05/04/2021 1.01  0.60 - 1.20 mg/dL Final     GFR Estimate 05/04/2021 55* >60 mL/min/[1.73_m2] Final     GFR Estimate If Black 05/04/2021 66  >60 mL/min/[1.73_m2] Final     Calcium 05/04/2021 9.6  8.6 - 10.3 mg/dL Final     Bilirubin Total 05/04/2021 0.4  0.3 - 1.0 mg/dL Final     Albumin 05/04/2021 4.3  3.5 - 5.7 g/dL Final     Protein Total 05/04/2021 7.1  6.4 - 8.9 g/dL Final     Alkaline Phosphatase 05/04/2021 69  34 - 104 U/L Final     ALT 05/04/2021 16  7 - 52 U/L Final     AST 05/04/2021 25  13 - 39 U/L Final     WBC 05/04/2021 3.2* 4.0 - 11.0 10e9/L Final     RBC Count 05/04/2021 3.25* 3.8 - 5.2 10e12/L Final     Hemoglobin 05/04/2021 11.6* 11.7 - 15.7 g/dL Final     Hematocrit 05/04/2021 33.5* 35.0 - 47.0 % Final     MCV 05/04/2021 103* 78 - 100 fl Final     MCH 05/04/2021 35.7* 26.5 - 33.0 pg Final     MCHC 05/04/2021 34.6  31.5 - 36.5 g/dL Final     RDW 05/04/2021 13.4  10.0 - 15.0 % Final     Platelet Count 05/04/2021 226  150 - 450 10e9/L Final     Diff Method 05/04/2021 Automated Method   Final     % Neutrophils 05/04/2021 43.9  % Final     % Lymphocytes 05/04/2021 40.4  % Final     % Monocytes 05/04/2021 12.0  % Final     % Eosinophils 05/04/2021 2.2  % Final      % Basophils 05/04/2021 1.2  % Final     % Immature Granulocytes 05/04/2021 0.3  % Final     Absolute Neutrophil 05/04/2021 1.4* 1.6 - 8.3 10e9/L Final     Absolute Lymphocytes 05/04/2021 1.3  0.8 - 5.3 10e9/L Final     Absolute Monocytes 05/04/2021 0.4  0.0 - 1.3 10e9/L Final     Absolute Eosinophils 05/04/2021 0.1  0.0 - 0.7 10e9/L Final     Absolute Basophils 05/04/2021 0.0  0.0 - 0.2 10e9/L Final     Abs Immature Granulocytes 05/04/2021 0.0  0 - 0.4 10e9/L Final        ASSESSMENT/PLAN:  Chronic myelogenous leukemia diagnosed in 2003 and currently with molecular hematologic and cytogenetic remission on imatinib 400 mg daily. Labs done on 5/4/21 show a decreased WBC of 3.2, hemoglobin is 11.6, platelets are normal. BCR ABL1 is undetectable. All other labs are stable. CBC redrawn today and WBC and hemoglobin are normal.  The plan is to continue surveillance. We will see the patient in 4 months with CBC, CMP, LDH, and BCR-ABL transcript. Will place referral to gynecology for evaluation of right ovarian cyst.     Forty four minutes spent with this encounter with time spent reviewing patient records, counseling patient regarding disease process, interpretation and review of labs with patient, discussing recent decrease in WBCs, discussing results of MRI and possible ovarian cyst, obtaining a review of systems, performing a physical exam, discussing plan for follow up, ordering tests, documenting in EHR and coordination of care

## 2021-05-24 NOTE — PROGRESS NOTES
Gynecology Office Visit    Chief Complaint: ovarian cyst    HPI:    Alison Norwood is a 66 year old , here for discussion of ovarian cyst that was incidentally found on a lumbar MRI from [facility] on May 3, 2021. It was commented to be 2.2 cm and simple in appearance, likely from the right adnexa. She notes she has not had any symptoms of this. She denies pelvic discomfort or pain with intercourse. She has not had any postmenopausal bleeding.    In terms of her gyn history, she went into menopause around age 54 and has not had any significant symptoms since then. She does have history of a prior ovarian cyst on her left ovary which required surgical removal in . Reviewed the operative report from [facility] in 2005 which shows laparoscopic lysis of adhesions and left salpingo-oophorectomy was performed. Pathology reviewed as well which showed benign left ovarian simple cyst and fragment of fimbriated endo of fallopian tube.      OBHx  OB History   No obstetric history on file.      x4   Pelvis proven to 10 lbs 3 oz    GYN history:   No history of STIs  Last pap smear in the chart: 2018: unsatisfactory for evaluation due to scant cellularity. HPV negative    Has never had a hysterectomy in the past    No history of abnormal pap smears    Menopause around age 54, occasional hot flashes    Denies pain with intercourse    Past medical history:  Past Medical History:   Diagnosis Date     CML (chronic myelocytic leukemia) (H)    Currently on Gleevec daily.     Specifically denies VTE, DM, HTN    Past Surgical History:  Past Surgical History:   Procedure Laterality Date     AS BLEPHAROPLASTY UPPER LID W EXCESS SKIN Bilateral      BUNIONECTOMY Left 2019     COLONOSCOPY  2011     CYSTECTOMY OVARIAN BENIGN       IR PORT CHECK RIGHT  2020     OPEN REDUCTION INTERNAL FIXATION FOOT Right 2020    Paul's fracture       Medications:  Current Outpatient Medications    Medication     Calcium-Magnesium-Zinc 333-133-5 MG TABS per tablet     cholecalciferol 50 MCG (2000 UT) tablet     famotidine (PEPCID) 40 MG tablet     imatinib (GLEEVEC) 400 MG TABS tablet     Probiotic Product (ACIDOPHILUS PROBIOTIC BLEND) CAPS     valACYclovir (VALTREX) 1000 mg tablet     No current facility-administered medications for this visit.        Allergies:     No Known Allergies      Social History:  Social History     Tobacco Use     Smoking status: Passive Smoke Exposure - Never Smoker     Smokeless tobacco: Never Used     Tobacco comment: In childhood home   Substance Use Topics     Alcohol use: Yes     Frequency: 2-3 times a week     Drinks per session: 1 or 2     Comment: couple per week     Drug use: Never       Family History:  Family History   Problem Relation Age of Onset     Alzheimer Disease Mother         92     Heart Disease Father         MI at 71     Colon Polyps Sister         Possible cancer     Specifically denies breast, ovarian, pancreatic cancers  Specifically denies VTE, known familial thrombophilias and coagulopathies    ROS:   Respiratory: No shortness of breath, dyspnea on exertion, cough, or hemoptysis  Cardiovascular: negative for palpitations, chest pain, lower extremity edema and syncope or near-syncope  Gastrointestinal: negative for, nausea, vomiting and hematemesis  Genitourinary: negative for, dysuria, frequency and urgency  Musculoskeletal: negative for, back pain and muscular weakness  Psychiatric: negative for, anxiety, depression and hallucinations  Hematologic/Lymphatic/Immunologic: negative for, anemia, chills and fever      Physical Exam  /68   Pulse 92   Wt 75 kg (165 lb 6.4 oz)   Breastfeeding No   BMI 27.52 kg/m      Gen: Well-appearing, no acute distressed, well-groomed, alert  HEENT: Normocephalic, atraumatic  Cardiovascular: Regular rate. No peripheral edema, normal peripheral circulation  Pulm: Symmetric chest rise, non-labored  respirations  Abd: Soft, non-tender, non-distended  Ext: No LE edema, extremities warm and well perfused  Pelvic:  Normal appearing external female genitalia. Normal hair distribution. Vagina is without lesions with moist, pink ruggae. There is no vaginal discharge. Cervix parous, no lesions, no cervical motion tenderness. Uterus is approximately 7 cm, mobile, non-tender. No adnexal tenderness or solid nodularities.      Assessment/Plan  Alison Norwood is a 66 year old . female here for further workup of adnexal mass. Ovarian cyst 2.2 cm noted on MRI of pelvis- incidentally found.     # Ovarian Cyst  -- TVUS ordered  -- Exam today with mild discomfort in RLQ palpation, no peritoneal signs or signs of acute abdomen  -- MRI of lumbar back showed incidental 2.2cm simple appearing cyst in the region of the right adnexa. Asymptomatic  - Tumor markers collected today              -              -HE4              -AFP              -LDH  If tumor markers are normal, we discussed the option of surveillance with a repeat US in approximately 3-4 months to assess for changes. If any concerning findings come up with US imaging or tumor markers, will recommend surgical removal and will help connect with a gyn oncology team.    # Pap smear  - Last pap 2018 showed not enough cells to be an adequate sample. Will collect today as she will get adequate screening up to age 65 then.         Counseling and MDM:  We discussed the workup of adnexal cysts in detail. We discussed that ovarian cysts or adnexal masses can be from a variety of etiologies: both benign and malignant. We talked about the warning signs on imaging that can be more concerning for cancer including cyst septations, cyst nodularity, abdominal ascites, complex appearing cysts, any other signs on pelvic or abdominal imaging that is suspicious for malignant spread, or significant change in the cyst after close interval follow up (size or complexity).  Characteristics of ovarian cysts that are more suggestive of benign diseases include simple appearing cysts, thin-walls, no sign of ascites, cyst mobility on exam and during US with gentle push from the US probe if possible.     Ovarian cysts can resolve on their own, rupture, or continue to grow in size. If the cyst is simple in appearance they can be followed with close-interval surveillance in 4-8 weeks to assess for changes. If the cyst grows, or shows any of the concerning changes, the recommendation would be to proceed with surgery for removal. We discussed that one potential risk of waiting much longer if the cyst is the same at interval follow up would be risk of oviaran torsion.    We talked about how if there are any concerning characteristics for malignancy on US, these characteristics can be paired with collection of lab tumor markers. These tumor markers can be helpful if they are very elevated from baseline values, and more suggestive that this mass may be a cancer, however especially in pre-menopausal women they can be slightly elevated for other reasons and not necessarily correlate with malignancy of an ovarian cyst. Utilizing the  and HE4 to calcuate a ELKIN score can be an additional tool to differentiate malignancy risk. We discussed that each of these things can be markers to help suggest benign vs. Malignant, but they are not diagnostic and not 100% in finding malignancies. The only way to know for sure is to get a tissue sample and complete pathology examination.     With use of the US and tumor markers, we can plan for the next best steps for her care. If there is concern for malignancy, we discussed that it would be best for her to meet with a gyn oncology team to be able to get additional intra-operative frozen pathology and further surgery (lymph nodes etc) if necessary. If the US and tumor markers are suggestive of benign disease we can plan for surgery here.       In reviewing her  specific risk factors for malignancy she does not have a family history of ovarian cancer, MRI suggests a simple cyst- will ensure with US imaging. There was no ascites. She has previous history of a benign simple appearing cyst. My suspicion of a malignancy is low based on all of these pieces of the clinical picture, will follow up with tumor markers to ensure there are no significant elevations.     Total amount of time spent on day of encounter with chart prep, face to face, review of images, counseling, examination and documentation was 45 minutes    EM GARCIA MD on 5/25/2021 at 2:18 PM

## 2021-05-25 ENCOUNTER — OFFICE VISIT (OUTPATIENT)
Dept: OBGYN | Facility: OTHER | Age: 66
End: 2021-05-25
Attending: NURSE PRACTITIONER
Payer: MEDICARE

## 2021-05-25 VITALS
HEART RATE: 92 BPM | BODY MASS INDEX: 27.52 KG/M2 | DIASTOLIC BLOOD PRESSURE: 68 MMHG | WEIGHT: 165.4 LBS | SYSTOLIC BLOOD PRESSURE: 132 MMHG

## 2021-05-25 DIAGNOSIS — R93.5 ABNORMAL FINDINGS ON DIAGNOSTIC IMAGING OF OTHER ABDOMINAL REGIONS, INCLUDING RETROPERITONEUM: ICD-10-CM

## 2021-05-25 DIAGNOSIS — Z12.4 CERVICAL CANCER SCREENING: ICD-10-CM

## 2021-05-25 DIAGNOSIS — R19.09 OTHER INTRA-ABDOMINAL AND PELVIC SWELLING, MASS AND LUMP: ICD-10-CM

## 2021-05-25 DIAGNOSIS — N83.201 CYST OF RIGHT OVARY: Primary | ICD-10-CM

## 2021-05-25 LAB
AFP SERPL-MCNC: 2.3 UG/L (ref 0–8)
CANCER AG125 SERPL-ACNC: 6 U/ML (ref 0–35)
LDH SERPL L TO P-CCNC: 244 U/L (ref 140–271)
MISCELLANEOUS TEST: NORMAL

## 2021-05-25 PROCEDURE — G0463 HOSPITAL OUTPT CLINIC VISIT: HCPCS | Mod: 25 | Performed by: STUDENT IN AN ORGANIZED HEALTH CARE EDUCATION/TRAINING PROGRAM

## 2021-05-25 PROCEDURE — 86305 HUMAN EPIDIDYMIS PROTEIN 4: CPT | Mod: ZL | Performed by: STUDENT IN AN ORGANIZED HEALTH CARE EDUCATION/TRAINING PROGRAM

## 2021-05-25 PROCEDURE — 84999 UNLISTED CHEMISTRY PROCEDURE: CPT | Mod: ZL | Performed by: STUDENT IN AN ORGANIZED HEALTH CARE EDUCATION/TRAINING PROGRAM

## 2021-05-25 PROCEDURE — 36415 COLL VENOUS BLD VENIPUNCTURE: CPT | Mod: ZL | Performed by: STUDENT IN AN ORGANIZED HEALTH CARE EDUCATION/TRAINING PROGRAM

## 2021-05-25 PROCEDURE — 99204 OFFICE O/P NEW MOD 45 MIN: CPT | Mod: 25 | Performed by: STUDENT IN AN ORGANIZED HEALTH CARE EDUCATION/TRAINING PROGRAM

## 2021-05-25 PROCEDURE — 83615 LACTATE (LD) (LDH) ENZYME: CPT | Mod: ZL | Performed by: STUDENT IN AN ORGANIZED HEALTH CARE EDUCATION/TRAINING PROGRAM

## 2021-05-25 PROCEDURE — G0123 SCREEN CERV/VAG THIN LAYER: HCPCS | Performed by: STUDENT IN AN ORGANIZED HEALTH CARE EDUCATION/TRAINING PROGRAM

## 2021-05-25 PROCEDURE — 82105 ALPHA-FETOPROTEIN SERUM: CPT | Mod: ZL | Performed by: STUDENT IN AN ORGANIZED HEALTH CARE EDUCATION/TRAINING PROGRAM

## 2021-05-25 PROCEDURE — 86304 IMMUNOASSAY TUMOR CA 125: CPT | Mod: ZL | Performed by: STUDENT IN AN ORGANIZED HEALTH CARE EDUCATION/TRAINING PROGRAM

## 2021-05-25 PROCEDURE — 87624 HPV HI-RISK TYP POOLED RSLT: CPT | Mod: ZL | Performed by: STUDENT IN AN ORGANIZED HEALTH CARE EDUCATION/TRAINING PROGRAM

## 2021-05-25 ASSESSMENT — PAIN SCALES - GENERAL: PAINLEVEL: MILD PAIN (2)

## 2021-05-25 NOTE — NURSING NOTE
Pt presents to clinic today for consult on right ovarian cyst that they found on a MRI in [place] on May 3rd.      Medication Reconciliation: complete  Genevieve Roland LPN

## 2021-05-27 LAB
COPATH REPORT: NORMAL
PAP: NORMAL
RESULT: NORMAL
SEND OUTS MISC TEST CODE: NORMAL
SEND OUTS MISC TEST SPECIMEN: NORMAL
TEST NAME: NORMAL

## 2021-05-28 ENCOUNTER — HOSPITAL ENCOUNTER (OUTPATIENT)
Dept: ULTRASOUND IMAGING | Facility: OTHER | Age: 66
Discharge: HOME | End: 2021-05-28
Attending: STUDENT IN AN ORGANIZED HEALTH CARE EDUCATION/TRAINING PROGRAM | Admitting: STUDENT IN AN ORGANIZED HEALTH CARE EDUCATION/TRAINING PROGRAM
Payer: MEDICARE

## 2021-05-28 DIAGNOSIS — N83.201 CYST OF RIGHT OVARY: ICD-10-CM

## 2021-05-28 PROCEDURE — 76856 US EXAM PELVIC COMPLETE: CPT

## 2021-05-28 PROCEDURE — 76830 TRANSVAGINAL US NON-OB: CPT

## 2021-06-02 DIAGNOSIS — N83.201 CYST OF RIGHT OVARY: Primary | ICD-10-CM

## 2021-06-02 NOTE — PLAN OF CARE
Ambulated about 300 feet on nursing unit. Tolerated but did ambulate without 02 and was 86% without it. She is back in bed on 1L 02. Lilian Bronson RN on 8/8/2020 at 5:07 PM     None

## 2021-06-04 LAB
FINAL DIAGNOSIS: NORMAL
HPV HR 12 DNA CVX QL NAA+PROBE: NEGATIVE
HPV16 DNA SPEC QL NAA+PROBE: NEGATIVE
HPV18 DNA SPEC QL NAA+PROBE: NEGATIVE
SPECIMEN DESCRIPTION: NORMAL
SPECIMEN SOURCE CVX/VAG CYTO: NORMAL

## 2021-07-09 ENCOUNTER — TELEPHONE (OUTPATIENT)
Dept: ONCOLOGY | Facility: OTHER | Age: 66
End: 2021-07-09

## 2021-07-09 DIAGNOSIS — C92.11 CHRONIC MYELOID LEUKEMIA IN REMISSION (H): ICD-10-CM

## 2021-07-09 NOTE — TELEPHONE ENCOUNTER
Refill Gleevac faxed to [facility] Patient [facility] at [phone].   Debora Freeman RN...........7/9/2021 2:05 PM

## 2021-09-13 ENCOUNTER — LAB (OUTPATIENT)
Dept: LAB | Facility: OTHER | Age: 66
End: 2021-09-13
Attending: NURSE PRACTITIONER
Payer: MEDICARE

## 2021-09-13 DIAGNOSIS — C92.11 CHRONIC MYELOID LEUKEMIA IN REMISSION (H): ICD-10-CM

## 2021-09-13 LAB
ALBUMIN SERPL-MCNC: 4.1 G/DL (ref 3.5–5.7)
ALP SERPL-CCNC: 72 U/L (ref 34–104)
ALT SERPL W P-5'-P-CCNC: 15 U/L (ref 7–52)
ANION GAP SERPL CALCULATED.3IONS-SCNC: 6 MMOL/L (ref 3–14)
AST SERPL W P-5'-P-CCNC: 23 U/L (ref 13–39)
BASOPHILS # BLD AUTO: 0 10E3/UL (ref 0–0.2)
BASOPHILS NFR BLD AUTO: 1 %
BILIRUB SERPL-MCNC: 0.4 MG/DL (ref 0.3–1)
BUN SERPL-MCNC: 13 MG/DL (ref 7–25)
CALCIUM SERPL-MCNC: 9.4 MG/DL (ref 8.6–10.3)
CHLORIDE BLD-SCNC: 104 MMOL/L (ref 98–107)
CO2 SERPL-SCNC: 28 MMOL/L (ref 21–31)
CREAT SERPL-MCNC: 1 MG/DL (ref 0.6–1.2)
EOSINOPHIL # BLD AUTO: 0 10E3/UL (ref 0–0.7)
EOSINOPHIL NFR BLD AUTO: 1 %
ERYTHROCYTE [DISTWIDTH] IN BLOOD BY AUTOMATED COUNT: 14.4 % (ref 10–15)
GFR SERPL CREATININE-BSD FRML MDRD: 58 ML/MIN/1.73M2
GLUCOSE BLD-MCNC: 97 MG/DL (ref 70–105)
HCT VFR BLD AUTO: 31.7 % (ref 35–47)
HGB BLD-MCNC: 11 G/DL (ref 11.7–15.7)
HOLD SPECIMEN: NORMAL
IMM GRANULOCYTES # BLD: 0 10E3/UL
IMM GRANULOCYTES NFR BLD: 0 %
LDH SERPL L TO P-CCNC: 222 U/L (ref 140–271)
LYMPHOCYTES # BLD AUTO: 1.2 10E3/UL (ref 0.8–5.3)
LYMPHOCYTES NFR BLD AUTO: 30 %
MCH RBC QN AUTO: 35.6 PG (ref 26.5–33)
MCHC RBC AUTO-ENTMCNC: 34.7 G/DL (ref 31.5–36.5)
MCV RBC AUTO: 103 FL (ref 78–100)
MONOCYTES # BLD AUTO: 0.4 10E3/UL (ref 0–1.3)
MONOCYTES NFR BLD AUTO: 11 %
NEUTROPHILS # BLD AUTO: 2.4 10E3/UL (ref 1.6–8.3)
NEUTROPHILS NFR BLD AUTO: 57 %
NRBC # BLD AUTO: 0 10E3/UL
NRBC BLD AUTO-RTO: 0 /100
PLATELET # BLD AUTO: 245 10E3/UL (ref 150–450)
POTASSIUM BLD-SCNC: 3.9 MMOL/L (ref 3.5–5.1)
PROT SERPL-MCNC: 6.8 G/DL (ref 6.4–8.9)
RBC # BLD AUTO: 3.09 10E6/UL (ref 3.8–5.2)
SODIUM SERPL-SCNC: 138 MMOL/L (ref 134–144)
WBC # BLD AUTO: 4.1 10E3/UL (ref 4–11)

## 2021-09-13 PROCEDURE — 36415 COLL VENOUS BLD VENIPUNCTURE: CPT | Mod: ZL

## 2021-09-13 PROCEDURE — 85025 COMPLETE CBC W/AUTO DIFF WBC: CPT | Mod: ZL

## 2021-09-13 PROCEDURE — 83615 LACTATE (LD) (LDH) ENZYME: CPT | Mod: ZL

## 2021-09-13 PROCEDURE — 80053 COMPREHEN METABOLIC PANEL: CPT | Mod: ZL

## 2021-09-20 ENCOUNTER — ONCOLOGY VISIT (OUTPATIENT)
Dept: ONCOLOGY | Facility: OTHER | Age: 66
End: 2021-09-20
Attending: NURSE PRACTITIONER
Payer: MEDICARE

## 2021-09-20 VITALS
HEART RATE: 78 BPM | OXYGEN SATURATION: 98 % | RESPIRATION RATE: 14 BRPM | TEMPERATURE: 98.1 F | SYSTOLIC BLOOD PRESSURE: 112 MMHG | DIASTOLIC BLOOD PRESSURE: 60 MMHG

## 2021-09-20 DIAGNOSIS — C92.11 CHRONIC MYELOID LEUKEMIA IN REMISSION (H): ICD-10-CM

## 2021-09-20 LAB
LAB DIRECTOR COMMENTS: NORMAL
LAB DIRECTOR DISCLAIMER: NORMAL
LAB DIRECTOR INTERPRETATION: NORMAL
LAB DIRECTOR METHODOLOGY: NORMAL
LAB DIRECTOR RESULTS: NORMAL
SPECIMEN DESCRIPTION: NORMAL

## 2021-09-20 PROCEDURE — 81206 BCR/ABL1 GENE MAJOR BP: CPT | Mod: ZL | Performed by: NURSE PRACTITIONER

## 2021-09-20 PROCEDURE — G0463 HOSPITAL OUTPT CLINIC VISIT: HCPCS

## 2021-09-20 PROCEDURE — 99213 OFFICE O/P EST LOW 20 MIN: CPT | Performed by: NURSE PRACTITIONER

## 2021-09-20 PROCEDURE — 36415 COLL VENOUS BLD VENIPUNCTURE: CPT | Mod: ZL | Performed by: NURSE PRACTITIONER

## 2021-09-20 ASSESSMENT — PAIN SCALES - GENERAL: PAINLEVEL: NO PAIN (0)

## 2021-09-20 NOTE — NURSING NOTE
Medication Reconciliation: complete    FOOD SECURITY SCREENING QUESTIONS  Hunger Vital Signs:  Within the past 12 months we worried whether our food would run out before we got money to buy more. Never  Within the past 12 months the food we bought just didn't last and we didn't have money to get more. Never  Debora Freeman RN 9/20/2021 10:41 AM

## 2021-09-20 NOTE — PROGRESS NOTES
Oncology Follow-up Visit:  September 20, 2021  Diagnosis:Chronic myelogenous leukemia    History Of Present Illness:  Patient here for follow up of her CML. Patient was initially seen on 11/11/20 to evaluate concerning her diagnosis of chronic myelogenous leukemia.  Patient was initially diagnosed in 2003. Patient was living in [place] and presented with hyperleukocytosis splenomegaly and was diagnosed with CML with BCR-ABL translocation p210 gene being translocated.  She was treated initially with Hydrea by Dr. Asad Escobar at [facility] and then placed on Gleevec room at and imatinib 400 mg p.o. daily.  She had been followed since then by the nurse practitioner in Hematology/Oncology in [facility].  Now has moved to the [facility]. Patient states she has been in hematologic, cytogenetic and molecular remission over the past 15 years. She has tolerated imatinib well.  nitially, she had problems with diarrhea.  She was switched to generic Gleevec and then symptoms seemed to improve, but she occasionally gets bouts of nausea and diarrhea. She was also noted to have a possible cyst on the right ovary. Patient does report some RLQ abdominal tenderness. She was seen by gynecology and had a pelvic ultrsound showing a benign right ovarian cyst. She will continue follow up with gynecology. Patient continues on the Gleevec 400mg daily. Labs done on 9/13/21 show a WBC of 4.1, hemoglobin is 11.0, platelets are normal. BCR ABL1 is pending as this was just drawn today. All other labs are stable. Patient has been feeling well. She is tolerating the Gleevec. She continues to have chronic low back pain and will be seeing a spine specialist in the near future. Patient has no other new concerns at this time.    Review Of Systems:  Review Of Systems  Eyes/Ears/Nose/Throat: denies new vision or hearing changes  Respiratory: No shortness of breath, dyspnea on exertion,  cough  Cardiovascular: denies chest pain or palpitations  Gastrointestinal: denies abdominal pain, no bowel changes  Genitourinary: denies dysuria or hematuria  Musculoskeletal: denies new bone pain or muscle weakness  Neurologic: reports chronic low back pain, occasional headaches, no dizziness  Hematologic/Lymphatic/Immunologic: denies fevers, night sweats        Nursing Notes:   Debora Freeman RN  9/20/2021 10:42 AM  Signed    Medication Reconciliation: complete    FOOD SECURITY SCREENING QUESTIONS  Hunger Vital Signs:  Within the past 12 months we worried whether our food would run out before we got money to buy more. Never  Within the past 12 months the food we bought just didn't last and we didn't have money to get more. Never  Debora Freeman RN 9/20/2021 10:41 AM              Past medical, social, surgical, and family histories reviewed.    Allergies:  Allergies as of 09/20/2021     (No Known Allergies)       Current Medications:  Current Outpatient Medications   Medication Sig Dispense Refill     Calcium-Magnesium-Zinc 333-133-5 MG TABS per tablet Take 1 tablet by mouth daily       cholecalciferol 50 MCG (2000 UT) tablet Take 2,000 Units by mouth daily       famotidine (PEPCID) 40 MG tablet Take 40 mg by mouth daily as needed       imatinib (GLEEVEC) 400 MG TABS tablet Take 1 tablet (400 mg) by mouth daily 30 tablet 6     Probiotic Product (ACIDOPHILUS PROBIOTIC BLEND) CAPS Take 1 capsule by mouth daily       valACYclovir (VALTREX) 1000 mg tablet TAKE 2 TABLETS BY MOUTH AT BREAKOUT, THEN TAKE 2 MORE TABLETS 12 HOURS LATER          Physical Exam:  There were no vitals taken for this visit.    GENERAL APPEARANCE: 66 year old female, alert and no distress     NECK: no adenopathy, no asymmetry or masses     LYMPHATICS: No cervical, supraclavicular, axillary lymphadenopathy     RESP: lungs clear to auscultation - no rales, rhonchi or wheezes     CARDIOVASCULAR: regular rates and rhythm, normal S1 S2      ABDOMEN:  soft, nontender, bowel sounds normal     MUSCULOSKELETAL: extremities normal- no gross deformities noted,  No edema b/l LE.     SKIN: no suspicious lesions or rashes on exposed skin     PSYCHIATRIC: mentation appears normal and affect normal    Laboratory/Imaging Studies  Lab on 09/13/2021   Component Date Value Ref Range Status     Lactate Dehydrogenase 09/13/2021 222  140 - 271 U/L Final     Sodium 09/13/2021 138  134 - 144 mmol/L Final     Potassium 09/13/2021 3.9  3.5 - 5.1 mmol/L Final     Chloride 09/13/2021 104  98 - 107 mmol/L Final     Carbon Dioxide (CO2) 09/13/2021 28  21 - 31 mmol/L Final     Anion Gap 09/13/2021 6  3 - 14 mmol/L Final     Urea Nitrogen 09/13/2021 13  7 - 25 mg/dL Final     Creatinine 09/13/2021 1.00  0.60 - 1.20 mg/dL Final     Calcium 09/13/2021 9.4  8.6 - 10.3 mg/dL Final     Glucose 09/13/2021 97  70 - 105 mg/dL Final     Alkaline Phosphatase 09/13/2021 72  34 - 104 U/L Final     AST 09/13/2021 23  13 - 39 U/L Final     ALT 09/13/2021 15  7 - 52 U/L Final     Protein Total 09/13/2021 6.8  6.4 - 8.9 g/dL Final     Albumin 09/13/2021 4.1  3.5 - 5.7 g/dL Final     Bilirubin Total 09/13/2021 0.4  0.3 - 1.0 mg/dL Final     GFR Estimate 09/13/2021 58* >60 mL/min/1.73m2 Final    As of July 11, 2021, eGFR is calculated by the CKD-EPI creatinine equation, without race adjustment. eGFR can be influenced by muscle mass, exercise, and diet. The reported eGFR is an estimation only and is only applicable if the renal function is stable.     WBC Count 09/13/2021 4.1  4.0 - 11.0 10e3/uL Final     RBC Count 09/13/2021 3.09* 3.80 - 5.20 10e6/uL Final     Hemoglobin 09/13/2021 11.0* 11.7 - 15.7 g/dL Final     Hematocrit 09/13/2021 31.7* 35.0 - 47.0 % Final     MCV 09/13/2021 103* 78 - 100 fL Final     MCH 09/13/2021 35.6* 26.5 - 33.0 pg Final     MCHC 09/13/2021 34.7  31.5 - 36.5 g/dL Final     RDW 09/13/2021 14.4  10.0 - 15.0 % Final     Platelet Count 09/13/2021 245  150 - 450 10e3/uL Final      % Neutrophils 09/13/2021 57  % Final     % Lymphocytes 09/13/2021 30  % Final     % Monocytes 09/13/2021 11  % Final     % Eosinophils 09/13/2021 1  % Final     % Basophils 09/13/2021 1  % Final     % Immature Granulocytes 09/13/2021 0  % Final     NRBCs per 100 WBC 09/13/2021 0  <1 /100 Final     Absolute Neutrophils 09/13/2021 2.4  1.6 - 8.3 10e3/uL Final     Absolute Lymphocytes 09/13/2021 1.2  0.8 - 5.3 10e3/uL Final     Absolute Monocytes 09/13/2021 0.4  0.0 - 1.3 10e3/uL Final     Absolute Eosinophils 09/13/2021 0.0  0.0 - 0.7 10e3/uL Final     Absolute Basophils 09/13/2021 0.0  0.0 - 0.2 10e3/uL Final     Absolute Immature Granulocytes 09/13/2021 0.0  <=0.0 10e3/uL Final     Absolute NRBCs 09/13/2021 0.0  10e3/uL Final     Hold Specimen 09/13/2021 [facility]   Final        ASSESSMENT/PLAN:  Chronic myelogenous leukemia diagnosed in 2003 and currently with molecular hematologic and cytogenetic remission on imatinib 400 mg daily. Labs done on 9/13/21 show a WBC of 4.1, hemoglobin is 11.0, platelets are normal. BCR ABL1 is pending as this was just drawn today. All other labs are stable. The plan is to continue surveillance. Patient will continue on the Gleevec. We will see the patient in 6 months with CBC, CMP, LDH, and BCR-ABL transcript.     Twenty five minutes spent with this encounter with time spent reviewing patient records, counseling patient regarding disease process, interpretation and review of labs with patient, obtaining a review of systems, performing a physical exam, discussing plan for follow up, ordering labs, documenting in EHR and coordination of care

## 2021-09-23 ENCOUNTER — MYC MEDICAL ADVICE (OUTPATIENT)
Dept: ONCOLOGY | Facility: OTHER | Age: 66
End: 2021-09-23

## 2021-10-05 ENCOUNTER — HOSPITAL ENCOUNTER (OUTPATIENT)
Dept: ULTRASOUND IMAGING | Facility: OTHER | Age: 66
Discharge: HOME | End: 2021-10-05
Attending: STUDENT IN AN ORGANIZED HEALTH CARE EDUCATION/TRAINING PROGRAM | Admitting: STUDENT IN AN ORGANIZED HEALTH CARE EDUCATION/TRAINING PROGRAM
Payer: MEDICARE

## 2021-10-05 DIAGNOSIS — N83.201 CYST OF RIGHT OVARY: ICD-10-CM

## 2021-10-05 DIAGNOSIS — N83.201 CYST OF RIGHT OVARY: Primary | ICD-10-CM

## 2021-10-05 PROCEDURE — 76830 TRANSVAGINAL US NON-OB: CPT

## 2021-10-11 ENCOUNTER — HEALTH MAINTENANCE LETTER (OUTPATIENT)
Age: 66
End: 2021-10-11

## 2021-11-02 ENCOUNTER — TRANSFERRED RECORDS (OUTPATIENT)
Dept: HEALTH INFORMATION MANAGEMENT | Facility: OTHER | Age: 66
End: 2021-11-02

## 2021-12-17 ENCOUNTER — TRANSFERRED RECORDS (OUTPATIENT)
Dept: HEALTH INFORMATION MANAGEMENT | Facility: OTHER | Age: 66
End: 2021-12-17
Payer: COMMERCIAL

## 2022-01-13 DIAGNOSIS — C92.11 CHRONIC MYELOID LEUKEMIA IN REMISSION (H): Primary | ICD-10-CM

## 2022-01-13 RX ORDER — IMATINIB MESYLATE 400 MG/1
400 TABLET, FILM COATED ORAL DAILY
Qty: 30 TABLET | Refills: 0 | Status: SHIPPED | OUTPATIENT
Start: 2022-01-13 | End: 2022-02-12

## 2022-02-01 ENCOUNTER — HOSPITAL ENCOUNTER (OUTPATIENT)
Dept: ULTRASOUND IMAGING | Facility: OTHER | Age: 67
Discharge: HOME | End: 2022-02-01
Attending: STUDENT IN AN ORGANIZED HEALTH CARE EDUCATION/TRAINING PROGRAM | Admitting: STUDENT IN AN ORGANIZED HEALTH CARE EDUCATION/TRAINING PROGRAM
Payer: MEDICARE

## 2022-02-01 DIAGNOSIS — N83.201 CYST OF RIGHT OVARY: Primary | ICD-10-CM

## 2022-02-01 DIAGNOSIS — N83.201 CYST OF RIGHT OVARY: ICD-10-CM

## 2022-02-01 PROCEDURE — 76856 US EXAM PELVIC COMPLETE: CPT

## 2022-02-03 ENCOUNTER — TELEPHONE (OUTPATIENT)
Dept: ONCOLOGY | Facility: OTHER | Age: 67
End: 2022-02-03

## 2022-02-03 NOTE — TELEPHONE ENCOUNTER
Free Drug Application Initiated  Medication Gleevec  Sponsor Katelyn  Phone #[phone]  Fax # [phone]  Additional Information submitted 12/08/21--01/25/22 application in process  02/08/22 application in process

## 2022-02-11 DIAGNOSIS — C92.11 CHRONIC MYELOID LEUKEMIA IN REMISSION (H): Primary | ICD-10-CM

## 2022-02-11 RX ORDER — IMATINIB MESYLATE 400 MG/1
400 TABLET, FILM COATED ORAL DAILY
Qty: 30 TABLET | Refills: 0 | Status: SHIPPED | OUTPATIENT
Start: 2022-02-11 | End: 2022-03-13

## 2022-03-01 ENCOUNTER — LAB (OUTPATIENT)
Dept: LAB | Facility: OTHER | Age: 67
End: 2022-03-01
Attending: NURSE PRACTITIONER
Payer: MEDICARE

## 2022-03-01 DIAGNOSIS — C92.11 CHRONIC MYELOID LEUKEMIA IN REMISSION (H): ICD-10-CM

## 2022-03-01 LAB
ALBUMIN SERPL-MCNC: 4.3 G/DL (ref 3.5–5.7)
ALP SERPL-CCNC: 65 U/L (ref 34–104)
ALT SERPL W P-5'-P-CCNC: 19 U/L (ref 7–52)
ANION GAP SERPL CALCULATED.3IONS-SCNC: 5 MMOL/L (ref 3–14)
AST SERPL W P-5'-P-CCNC: 28 U/L (ref 13–39)
BASOPHILS # BLD AUTO: 0 10E3/UL (ref 0–0.2)
BASOPHILS NFR BLD AUTO: 1 %
BILIRUB SERPL-MCNC: 0.4 MG/DL (ref 0.3–1)
BUN SERPL-MCNC: 15 MG/DL (ref 7–25)
CALCIUM SERPL-MCNC: 9.3 MG/DL (ref 8.6–10.3)
CHLORIDE BLD-SCNC: 101 MMOL/L (ref 98–107)
CO2 SERPL-SCNC: 31 MMOL/L (ref 21–31)
CREAT SERPL-MCNC: 1.04 MG/DL (ref 0.6–1.2)
EOSINOPHIL # BLD AUTO: 0 10E3/UL (ref 0–0.7)
EOSINOPHIL NFR BLD AUTO: 1 %
ERYTHROCYTE [DISTWIDTH] IN BLOOD BY AUTOMATED COUNT: 14.1 % (ref 10–15)
GFR SERPL CREATININE-BSD FRML MDRD: 58 ML/MIN/1.73M2
GLUCOSE BLD-MCNC: 86 MG/DL (ref 70–105)
HCT VFR BLD AUTO: 33.2 % (ref 35–47)
HGB BLD-MCNC: 11.3 G/DL (ref 11.7–15.7)
HOLD SPECIMEN: NORMAL
IMM GRANULOCYTES # BLD: 0 10E3/UL
IMM GRANULOCYTES NFR BLD: 0 %
LAB DIRECTOR COMMENTS: NORMAL
LAB DIRECTOR DISCLAIMER: NORMAL
LAB DIRECTOR INTERPRETATION: NORMAL
LAB DIRECTOR METHODOLOGY: NORMAL
LAB DIRECTOR RESULTS: NORMAL
LDH SERPL L TO P-CCNC: 240 U/L (ref 140–271)
LYMPHOCYTES # BLD AUTO: 1.4 10E3/UL (ref 0.8–5.3)
LYMPHOCYTES NFR BLD AUTO: 45 %
MCH RBC QN AUTO: 35.2 PG (ref 26.5–33)
MCHC RBC AUTO-ENTMCNC: 34 G/DL (ref 31.5–36.5)
MCV RBC AUTO: 103 FL (ref 78–100)
MONOCYTES # BLD AUTO: 0.4 10E3/UL (ref 0–1.3)
MONOCYTES NFR BLD AUTO: 12 %
NEUTROPHILS # BLD AUTO: 1.3 10E3/UL (ref 1.6–8.3)
NEUTROPHILS NFR BLD AUTO: 41 %
NRBC # BLD AUTO: 0 10E3/UL
NRBC BLD AUTO-RTO: 0 /100
PLATELET # BLD AUTO: 239 10E3/UL (ref 150–450)
POTASSIUM BLD-SCNC: 4 MMOL/L (ref 3.5–5.1)
PROT SERPL-MCNC: 7 G/DL (ref 6.4–8.9)
RBC # BLD AUTO: 3.21 10E6/UL (ref 3.8–5.2)
SODIUM SERPL-SCNC: 137 MMOL/L (ref 134–144)
SPECIMEN DESCRIPTION: NORMAL
WBC # BLD AUTO: 3.1 10E3/UL (ref 4–11)

## 2022-03-01 PROCEDURE — 36415 COLL VENOUS BLD VENIPUNCTURE: CPT | Mod: ZL

## 2022-03-01 PROCEDURE — 83615 LACTATE (LD) (LDH) ENZYME: CPT | Mod: ZL

## 2022-03-01 PROCEDURE — 80053 COMPREHEN METABOLIC PANEL: CPT | Mod: ZL

## 2022-03-01 PROCEDURE — 85025 COMPLETE CBC W/AUTO DIFF WBC: CPT | Mod: ZL

## 2022-03-01 PROCEDURE — 81206 BCR/ABL1 GENE MAJOR BP: CPT | Mod: ZL

## 2022-03-08 ENCOUNTER — ONCOLOGY VISIT (OUTPATIENT)
Dept: ONCOLOGY | Facility: OTHER | Age: 67
End: 2022-03-08
Attending: NURSE PRACTITIONER
Payer: MEDICARE

## 2022-03-08 VITALS
WEIGHT: 173 LBS | RESPIRATION RATE: 18 BRPM | DIASTOLIC BLOOD PRESSURE: 74 MMHG | SYSTOLIC BLOOD PRESSURE: 124 MMHG | HEART RATE: 92 BPM | TEMPERATURE: 97.9 F | OXYGEN SATURATION: 98 % | BODY MASS INDEX: 28.79 KG/M2

## 2022-03-08 DIAGNOSIS — C92.11 CHRONIC MYELOID LEUKEMIA IN REMISSION (H): Primary | ICD-10-CM

## 2022-03-08 PROCEDURE — G0463 HOSPITAL OUTPT CLINIC VISIT: HCPCS

## 2022-03-08 PROCEDURE — 99213 OFFICE O/P EST LOW 20 MIN: CPT | Performed by: NURSE PRACTITIONER

## 2022-03-08 ASSESSMENT — PAIN SCALES - GENERAL: PAINLEVEL: NO PAIN (0)

## 2022-03-08 NOTE — PROGRESS NOTES
Oncology Follow-up Visit:  March 8, 2022  Diagnosis:Chronic myelogenous leukemia    History Of Present Illness:  Patient here for follow up of her CML. Patient was initially seen on 11/11/20 to evaluate concerning her diagnosis of chronic myelogenous leukemia.  Patient was initially diagnosed in 2003. Patient was living in [place] and presented with hyperleukocytosis splenomegaly and was diagnosed with CML with BCR-ABL translocation p210 gene being translocated.  She was treated initially with Hydrea by Dr. Asad Escobar at [facility] and then placed on Gleevec room at and imatinib 400 mg p.o. daily.  She had been followed since then by the nurse practitioner in Hematology/Oncology in [facility].  Now has moved to the [facility]. Patient states she has been in hematologic, cytogenetic and molecular remission over the past 15 years. She has tolerated imatinib well.  nitially, she had problems with diarrhea.  She was switched to generic Gleevec and then symptoms seemed to improve, but she occasionally had bouts of nausea and diarrhea. Patient continues on the Gleevec 400mg daily. Labs done on 3/1/22 show a decreased WBC of 3.1, ANC is 1.3. Hemoglobin is 11.3, platelets are normal. BCR ABL1 is undetectable. All other labs are stable. She is tolerating the Gleevec. Patient states she recently had an URI. She reports having nasal congestion, cough for several weeks, covid test was negative. She states her symptoms are now resolving. Patient is otherwise doing well and has no other new concerns.         Review Of Systems:  Review Of Systems  Eyes/Ears/Nose/Throat: reports recent URI symptoms  Respiratory: reports improvement in cough, denies shortness of breath  Cardiovascular: denies chest pain or palpitations   Gastrointestinal: denies abdominal pain, no bowel changes  Genitourinary: denies dysuria or hematuria  Musculoskeletal: denies new bone pain or muscle  weakness  Neurologic: denies headaches, no dizziness  Hematologic/Lymphatic/Immunologic: reports ongoing night sweats, stable. Denies fevers, chills      There are no exam notes on file for this visit.    Past medical, social, surgical, and family histories reviewed.    Allergies:  Allergies as of 03/08/2022     (No Known Allergies)       Current Medications:  Current Outpatient Medications   Medication Sig Dispense Refill     Calcium-Magnesium-Zinc 333-133-5 MG TABS per tablet Take 1 tablet by mouth daily       cholecalciferol 50 MCG (2000 UT) tablet Take 2,000 Units by mouth daily       famotidine (PEPCID) 40 MG tablet Take 40 mg by mouth daily as needed       imatinib (GLEEVEC) 400 MG tablet Take 1 tablet (400 mg) by mouth daily Take with a meal and a large glass of water. 30 tablet 0     imatinib (GLEEVEC) 400 MG tablet Take 1 tablet (400 mg) by mouth daily Take with a meal and a large glass of water. 30 tablet 0     imatinib (GLEEVEC) 400 MG TABS tablet Take 1 tablet (400 mg) by mouth daily 30 tablet 6     Probiotic Product (ACIDOPHILUS PROBIOTIC BLEND) CAPS Take 1 capsule by mouth daily       valACYclovir (VALTREX) 1000 mg tablet TAKE 2 TABLETS BY MOUTH AT BREAKOUT, THEN TAKE 2 MORE TABLETS 12 HOURS LATER          Physical Exam:  There were no vitals taken for this visit.    GENERAL APPEARANCE: 67 year old female, alert and no distress     NECK: no adenopathy, no asymmetry or masses     LYMPHATICS: No cervical, supraclavicular, axillary lymphadenopathy     RESP: lungs clear to auscultation - no rales, rhonchi or wheezes     CARDIOVASCULAR: regular rates and rhythm, normal S1 S2     ABDOMEN:  soft, nontender,  bowel sounds normal     MUSCULOSKELETAL: extremities normal- no gross deformities noted, No edema b/l LE.     SKIN: no suspicious lesions or rashes on exposed skin     PSYCHIATRIC: mentation appears normal and affect normal    Laboratory/Imaging Studies  Lab on 03/01/2022   Component Date Value Ref Range  Status     METHODOLOGY 03/01/2022    Final                    Value:This result contains rich text formatting which cannot be displayed here.     RESULTS 03/01/2022    Final                    Value:This result contains rich text formatting which cannot be displayed here.     INTERPRETATION 03/01/2022    Final                    Value:This result contains rich text formatting which cannot be displayed here.     COMMENTS 03/01/2022    Final                    Value:This result contains rich text formatting which cannot be displayed here.     DISCLAIMER 03/01/2022    Final                    Value:This result contains rich text formatting which cannot be displayed here.     Specimen Description 03/01/2022    Final                    Value:This result contains rich text formatting which cannot be displayed here.     Lactate Dehydrogenase 03/01/2022 240  140 - 271 U/L Final     Sodium 03/01/2022 137  134 - 144 mmol/L Final     Potassium 03/01/2022 4.0  3.5 - 5.1 mmol/L Final     Chloride 03/01/2022 101  98 - 107 mmol/L Final     Carbon Dioxide (CO2) 03/01/2022 31  21 - 31 mmol/L Final     Anion Gap 03/01/2022 5  3 - 14 mmol/L Final     Urea Nitrogen 03/01/2022 15  7 - 25 mg/dL Final     Creatinine 03/01/2022 1.04  0.60 - 1.20 mg/dL Final     Calcium 03/01/2022 9.3  8.6 - 10.3 mg/dL Final     Glucose 03/01/2022 86  70 - 105 mg/dL Final     Alkaline Phosphatase 03/01/2022 65  34 - 104 U/L Final     AST 03/01/2022 28  13 - 39 U/L Final     ALT 03/01/2022 19  7 - 52 U/L Final     Protein Total 03/01/2022 7.0  6.4 - 8.9 g/dL Final     Albumin 03/01/2022 4.3  3.5 - 5.7 g/dL Final     Bilirubin Total 03/01/2022 0.4  0.3 - 1.0 mg/dL Final     GFR Estimate 03/01/2022 58 (A) >60 mL/min/1.73m2 Final    Effective December 21, 2021 eGFRcr in adults is calculated using the 2021 CKD-EPI creatinine equation which includes age and gender (Tom et al., NEJM, DOI: 10.1056/IWMUvp0417126)     WBC Count 03/01/2022 3.1 (A) 4.0 - 11.0 10e3/uL  Final     RBC Count 03/01/2022 3.21 (A) 3.80 - 5.20 10e6/uL Final     Hemoglobin 03/01/2022 11.3 (A) 11.7 - 15.7 g/dL Final     Hematocrit 03/01/2022 33.2 (A) 35.0 - 47.0 % Final     MCV 03/01/2022 103 (A) 78 - 100 fL Final     MCH 03/01/2022 35.2 (A) 26.5 - 33.0 pg Final     MCHC 03/01/2022 34.0  31.5 - 36.5 g/dL Final     RDW 03/01/2022 14.1  10.0 - 15.0 % Final     Platelet Count 03/01/2022 239  150 - 450 10e3/uL Final     % Neutrophils 03/01/2022 41  % Final     % Lymphocytes 03/01/2022 45  % Final     % Monocytes 03/01/2022 12  % Final     % Eosinophils 03/01/2022 1  % Final     % Basophils 03/01/2022 1  % Final     % Immature Granulocytes 03/01/2022 0  % Final     NRBCs per 100 WBC 03/01/2022 0  <1 /100 Final     Absolute Neutrophils 03/01/2022 1.3 (A) 1.6 - 8.3 10e3/uL Final     Absolute Lymphocytes 03/01/2022 1.4  0.8 - 5.3 10e3/uL Final     Absolute Monocytes 03/01/2022 0.4  0.0 - 1.3 10e3/uL Final     Absolute Eosinophils 03/01/2022 0.0  0.0 - 0.7 10e3/uL Final     Absolute Basophils 03/01/2022 0.0  0.0 - 0.2 10e3/uL Final     Absolute Immature Granulocytes 03/01/2022 0.0  <=0.4 10e3/uL Final     Absolute NRBCs 03/01/2022 0.0  10e3/uL Final     Hold Specimen 03/01/2022 [facility]   Final        ASSESSMENT/PLAN:  Chronic myelogenous leukemia diagnosed in 2003 and currently with molecular hematologic and cytogenetic remission on imatinib 400 mg daily. Labs done on 3/1/22 show a decreased WBC of 3.1, ANC is 1.3. Hemoglobin is 11.3, platelets are normal. BCR ABL1 is undetectable.  All other labs are stable. The plan is to continue surveillance. Patient will continue on the Gleevec. We will see the patient in 4 months with CBC, CMP, LDH, and BCR-ABL transcript.      Twenty eight minutes spent with this encounter with time spent reviewing patient records, counseling patient regarding disease process, interpretation and review of labs with patient, obtaining a review of systems, performing a physical exam, discussing  plan for follow up, documenting in EHR and coordination of care

## 2022-03-08 NOTE — NURSING NOTE
Chief Complaint   Patient presents with     Oncology Clinic Visit     F/U CML     Medication Reconciliation: complete    Yumiko Cruz CMA ([facility])

## 2022-03-14 DIAGNOSIS — C92.11 CHRONIC MYELOID LEUKEMIA IN REMISSION (H): Primary | ICD-10-CM

## 2022-03-14 RX ORDER — IMATINIB MESYLATE 400 MG/1
400 TABLET, FILM COATED ORAL DAILY
Qty: 30 TABLET | Refills: 0 | Status: SHIPPED | OUTPATIENT
Start: 2022-03-14 | End: 2022-05-10

## 2022-03-16 NOTE — TELEPHONE ENCOUNTER
Spoke to Huma, at [employer]. Application is still pending, should know in the next week or so they are super busy right now.

## 2022-03-25 NOTE — TELEPHONE ENCOUNTER
Spoke to Gina at [employer]. Application is approved 3/22/2022-12/31/2022 and will send new fax here soon.

## 2022-03-25 NOTE — TELEPHONE ENCOUNTER
Free Drug Application Approved  Effective Dates: 03/22/2022- 12/31/2022  Patient notified: yes  Additional Information: filling pharmacy: [facility] ph: [phone]

## 2022-04-14 ENCOUNTER — TELEPHONE (OUTPATIENT)
Dept: ONCOLOGY | Facility: OTHER | Age: 67
End: 2022-04-14
Payer: COMMERCIAL

## 2022-04-14 NOTE — TELEPHONE ENCOUNTER
Reason for call: Medication or medication refill    Name of medication requested: Tleevec    Are you out of the medication? no    What pharmacy do you use? [facility]    Preferred method for responding to this message: Telephone Call    Phone number patient can be reached at: Home number on file [phone] (home)    If we cannot reach you directly, may we leave a detailed response at the number you provided? Yes

## 2022-05-04 ENCOUNTER — HOSPITAL ENCOUNTER (OUTPATIENT)
Dept: ULTRASOUND IMAGING | Facility: OTHER | Age: 67
Discharge: HOME | End: 2022-05-04
Attending: STUDENT IN AN ORGANIZED HEALTH CARE EDUCATION/TRAINING PROGRAM | Admitting: STUDENT IN AN ORGANIZED HEALTH CARE EDUCATION/TRAINING PROGRAM
Payer: MEDICARE

## 2022-05-04 DIAGNOSIS — N83.201 CYST OF RIGHT OVARY: ICD-10-CM

## 2022-05-04 PROCEDURE — 76856 US EXAM PELVIC COMPLETE: CPT

## 2022-05-10 ENCOUNTER — TELEPHONE (OUTPATIENT)
Dept: INTERNAL MEDICINE | Facility: OTHER | Age: 67
End: 2022-05-10
Payer: COMMERCIAL

## 2022-05-10 DIAGNOSIS — C92.11 CHRONIC MYELOID LEUKEMIA IN REMISSION (H): ICD-10-CM

## 2022-05-10 RX ORDER — IMATINIB MESYLATE 400 MG/1
400 TABLET, FILM COATED ORAL DAILY
Qty: 30 TABLET | Refills: 3 | Status: SHIPPED | OUTPATIENT
Start: 2022-05-10 | End: 2022-05-11

## 2022-05-10 NOTE — TELEPHONE ENCOUNTER
Patient would like to discuss med refill with someone today and get her refills--- Celestine by mail but first wants a call

## 2022-05-10 NOTE — TELEPHONE ENCOUNTER
Updated patient on orders signed and refills of 3. Enough until she sees Danae at her next appointment. Alisia Floyd RN on 5/10/2022 at 5:00 PM

## 2022-05-10 NOTE — TELEPHONE ENCOUNTER
Patient requesting refills be sent to  Rx request for the following:      Requested Prescriptions   Pending Prescriptions Disp Refills     imatinib (GLEEVEC) 400 MG tablet 30 tablet 3     Sig: Take 1 tablet (400 mg) by mouth daily Take with a meal and a large glass of water.       There is no refill protocol information for this order          Last Prescription Date:   3/14/22  Last Fill Qty/Refills:       30, R-0    Last Office Visit:             3/8/22      Future Office visit:          None    Need refills sent [facility]. Alisia lFoyd RN on 5/10/2022 at 2:11 PM

## 2022-05-11 ENCOUNTER — MYC MEDICAL ADVICE (OUTPATIENT)
Dept: ONCOLOGY | Facility: OTHER | Age: 67
End: 2022-05-11
Payer: COMMERCIAL

## 2022-05-11 DIAGNOSIS — C92.11 CHRONIC MYELOID LEUKEMIA IN REMISSION (H): Primary | ICD-10-CM

## 2022-05-11 DIAGNOSIS — C92.11 CHRONIC MYELOID LEUKEMIA IN REMISSION (H): ICD-10-CM

## 2022-05-11 RX ORDER — IMATINIB MESYLATE 400 MG/1
400 TABLET, FILM COATED ORAL DAILY
Qty: 30 TABLET | Refills: 3 | Status: SHIPPED | OUTPATIENT
Start: 2022-05-11 | End: 2022-07-14

## 2022-05-11 RX ORDER — IMATINIB MESYLATE 400 MG/1
400 TABLET, FILM COATED ORAL DAILY
Qty: 30 TABLET | Refills: 7 | Status: SHIPPED | OUTPATIENT
Start: 2022-05-11 | End: 2022-06-10

## 2022-05-11 NOTE — TELEPHONE ENCOUNTER
Patient needed this medication changed from [place] to [place]. Medication was re routed. Alisia Floyd RN on 5/11/2022 at 4:11 PM

## 2022-05-11 NOTE — TELEPHONE ENCOUNTER
imatinib (GLEEVEC) 400 MG tablet 30 tablet 3 5/10/2022  No   Sig - Route: Take 1 tablet (400 mg) by mouth daily Take with a meal and a large glass of water. - Oral   [facility] [place], [place] - [address]    Per patients my chart message   She wants sent to but [facility] in [place], [place] says they do not have it.    Erin oral chemo med team sent to pharmacy patient requested.    Called and informed patient     Shanita Montgomery RN on 5/11/2022 at 4:17 PM

## 2022-05-22 ENCOUNTER — HEALTH MAINTENANCE LETTER (OUTPATIENT)
Age: 67
End: 2022-05-22

## 2022-07-07 ENCOUNTER — LAB (OUTPATIENT)
Dept: LAB | Facility: OTHER | Age: 67
End: 2022-07-07
Attending: NURSE PRACTITIONER
Payer: MEDICARE

## 2022-07-07 DIAGNOSIS — C92.11 CHRONIC MYELOID LEUKEMIA IN REMISSION (H): ICD-10-CM

## 2022-07-07 LAB
ALBUMIN SERPL-MCNC: 4.3 G/DL (ref 3.5–5.7)
ALP SERPL-CCNC: 68 U/L (ref 34–104)
ALT SERPL W P-5'-P-CCNC: 18 U/L (ref 7–52)
ANION GAP SERPL CALCULATED.3IONS-SCNC: 5 MMOL/L (ref 3–14)
AST SERPL W P-5'-P-CCNC: 29 U/L (ref 13–39)
BASOPHILS # BLD AUTO: 0 10E3/UL (ref 0–0.2)
BASOPHILS NFR BLD AUTO: 1 %
BILIRUB SERPL-MCNC: 0.4 MG/DL (ref 0.3–1)
BUN SERPL-MCNC: 14 MG/DL (ref 7–25)
CALCIUM SERPL-MCNC: 9.4 MG/DL (ref 8.6–10.3)
CHLORIDE BLD-SCNC: 103 MMOL/L (ref 98–107)
CO2 SERPL-SCNC: 30 MMOL/L (ref 21–31)
CREAT SERPL-MCNC: 0.96 MG/DL (ref 0.6–1.2)
EOSINOPHIL # BLD AUTO: 0 10E3/UL (ref 0–0.7)
EOSINOPHIL NFR BLD AUTO: 1 %
ERYTHROCYTE [DISTWIDTH] IN BLOOD BY AUTOMATED COUNT: 13.7 % (ref 10–15)
GFR SERPL CREATININE-BSD FRML MDRD: 64 ML/MIN/1.73M2
GLUCOSE BLD-MCNC: 81 MG/DL (ref 70–105)
HCT VFR BLD AUTO: 33.1 % (ref 35–47)
HGB BLD-MCNC: 11.4 G/DL (ref 11.7–15.7)
IMM GRANULOCYTES # BLD: 0 10E3/UL
IMM GRANULOCYTES NFR BLD: 0 %
LAB DIRECTOR COMMENTS: NORMAL
LAB DIRECTOR DISCLAIMER: NORMAL
LAB DIRECTOR INTERPRETATION: NORMAL
LAB DIRECTOR METHODOLOGY: NORMAL
LAB DIRECTOR RESULTS: NORMAL
LDH SERPL L TO P-CCNC: 226 U/L (ref 140–271)
LYMPHOCYTES # BLD AUTO: 1.2 10E3/UL (ref 0.8–5.3)
LYMPHOCYTES NFR BLD AUTO: 37 %
MCH RBC QN AUTO: 35.3 PG (ref 26.5–33)
MCHC RBC AUTO-ENTMCNC: 34.4 G/DL (ref 31.5–36.5)
MCV RBC AUTO: 103 FL (ref 78–100)
MONOCYTES # BLD AUTO: 0.4 10E3/UL (ref 0–1.3)
MONOCYTES NFR BLD AUTO: 14 %
NEUTROPHILS # BLD AUTO: 1.5 10E3/UL (ref 1.6–8.3)
NEUTROPHILS NFR BLD AUTO: 47 %
NRBC # BLD AUTO: 0 10E3/UL
NRBC BLD AUTO-RTO: 0 /100
PLATELET # BLD AUTO: 239 10E3/UL (ref 150–450)
POTASSIUM BLD-SCNC: 4.1 MMOL/L (ref 3.5–5.1)
PROT SERPL-MCNC: 6.7 G/DL (ref 6.4–8.9)
RBC # BLD AUTO: 3.23 10E6/UL (ref 3.8–5.2)
SODIUM SERPL-SCNC: 138 MMOL/L (ref 134–144)
SPECIMEN DESCRIPTION: NORMAL
WBC # BLD AUTO: 3.1 10E3/UL (ref 4–11)

## 2022-07-07 PROCEDURE — 82040 ASSAY OF SERUM ALBUMIN: CPT | Mod: ZL

## 2022-07-07 PROCEDURE — 36415 COLL VENOUS BLD VENIPUNCTURE: CPT | Mod: ZL

## 2022-07-07 PROCEDURE — 81206 BCR/ABL1 GENE MAJOR BP: CPT | Mod: ZL

## 2022-07-07 PROCEDURE — 85025 COMPLETE CBC W/AUTO DIFF WBC: CPT | Mod: ZL

## 2022-07-07 PROCEDURE — 80053 COMPREHEN METABOLIC PANEL: CPT | Mod: ZL

## 2022-07-07 PROCEDURE — 83615 LACTATE (LD) (LDH) ENZYME: CPT | Mod: ZL

## 2022-07-14 ENCOUNTER — ONCOLOGY VISIT (OUTPATIENT)
Dept: ONCOLOGY | Facility: OTHER | Age: 67
End: 2022-07-14
Attending: NURSE PRACTITIONER
Payer: MEDICARE

## 2022-07-14 VITALS
SYSTOLIC BLOOD PRESSURE: 106 MMHG | HEART RATE: 88 BPM | WEIGHT: 170 LBS | RESPIRATION RATE: 16 BRPM | OXYGEN SATURATION: 98 % | DIASTOLIC BLOOD PRESSURE: 82 MMHG | TEMPERATURE: 97.8 F | BODY MASS INDEX: 28.29 KG/M2

## 2022-07-14 DIAGNOSIS — C92.11 CHRONIC MYELOID LEUKEMIA IN REMISSION (H): ICD-10-CM

## 2022-07-14 PROCEDURE — G0463 HOSPITAL OUTPT CLINIC VISIT: HCPCS

## 2022-07-14 PROCEDURE — 99214 OFFICE O/P EST MOD 30 MIN: CPT | Performed by: NURSE PRACTITIONER

## 2022-07-14 RX ORDER — IMATINIB MESYLATE 400 MG/1
400 TABLET, FILM COATED ORAL DAILY
Qty: 30 TABLET | Refills: 4 | Status: SHIPPED | OUTPATIENT
Start: 2022-07-14 | End: 2024-03-14

## 2022-07-14 ASSESSMENT — PAIN SCALES - GENERAL: PAINLEVEL: NO PAIN (0)

## 2022-07-14 NOTE — PROGRESS NOTES
Oncology Follow-up Visit:  July 14, 2022  Diagnosis:CML    History Of Present Illness:  Patient here for follow up of her CML. Patient was initially seen on 11/11/20 to evaluate concerning her diagnosis of chronic myelogenous leukemia.  Patient was initially diagnosed in 2003. 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  She was treated initially with Hydrea by Dr. Asad Escobar at [facility] and then placed on Gleevec or imatinib 400 mg p.o. daily.  She had been followed since then by the nurse practitioner in Hematology/Oncology in [facility].  Now has moved to the [facility]. Patient states she has been in hematologic, cytogenetic and molecular remission over the past 15 years. She has tolerated imatinib well.  nitially, she had problems with diarrhea.  She was switched to generic Gleevec and then symptoms seemed to improve. Patient continues on the Gleevec 400mg daily. Labs done on 7/7/22 show a WBC of 3.1, ANC is 1.5. Hemoglobin is 11.4, platelets are normal. BCR ABL1 is undetectable. All other labs are stable. She is tolerating the Gleevec. Patient has been treatment for over 15 years. She has some questions about her treatment plan and continuing therapy.  Patient has been feeling well and has no other new concerns.     Review Of Systems:  Review Of Systems  Eyes/Ears/Nose/Throat: denies new vision changes  Respiratory: No shortness of breath, dyspnea on exertion, cough  Cardiovascular: denies chest pain or palpitations   Gastrointestinal: denies abdominal pain, no new bowel changes  Genitourinary: denies dysuria or hematuria  Musculoskeletal: reports ongoing right sided back pain, stable. No new bone pain  Neurologic: denies headaches, no dizziness  Hematologic/Lymphatic/Immunologic: denies fevers, no recent illness      There are no exam notes  on file for this visit.    Past medical, social, surgical, and family histories reviewed.    Allergies:  Allergies as of 07/14/2022     (No Known Allergies)       Current Medications:  Current Outpatient Medications   Medication Sig Dispense Refill     Calcium-Magnesium-Zinc 333-133-5 MG TABS per tablet Take 1 tablet by mouth daily       cholecalciferol 50 MCG (2000 UT) tablet Take 2,000 Units by mouth daily       famotidine (PEPCID) 40 MG tablet Take 40 mg by mouth daily as needed       imatinib (GLEEVEC) 400 MG tablet Take 1 tablet (400 mg) by mouth daily Take with a meal and a large glass of water. 30 tablet 3     imatinib (GLEEVEC) 400 MG tablet Take 1 tablet (400 mg) by mouth daily Take with a meal and a large glass of water. 30 tablet 7     imatinib (GLEEVEC) 400 MG tablet Take 1 tablet (400 mg) by mouth daily Take with a meal and a large glass of water. 30 tablet 0     imatinib (GLEEVEC) 400 MG tablet Take 1 tablet (400 mg) by mouth daily Take with a meal and a large glass of water. 30 tablet 0     imatinib (GLEEVEC) 400 MG TABS tablet Take 1 tablet (400 mg) by mouth daily 30 tablet 6     Probiotic Product (ACIDOPHILUS PROBIOTIC BLEND) CAPS Take 1 capsule by mouth daily       valACYclovir (VALTREX) 1000 mg tablet TAKE 2 TABLETS BY MOUTH AT BREAKOUT, THEN TAKE 2 MORE TABLETS 12 HOURS LATER          Physical Exam:  There were no vitals taken for this visit.    GENERAL APPEARANCE: 67 year old female, alert and no distress     NECK: no adenopathy, no asymmetry or masses     LYMPHATICS: No cervical, supraclavicular, axillary lymphadenopathy     RESP: lungs clear to auscultation - no rales, rhonchi or wheezes     CARDIOVASCULAR: regular rates and rhythm, normal S1 S2     ABDOMEN:  soft, nontender, bowel sounds normal     MUSCULOSKELETAL: extremities normal- no gross deformities noted, No edema b/l LE.     SKIN: no suspicious lesions or rashes on exposed skin     PSYCHIATRIC: mentation appears normal and affect  normal    Laboratory/Imaging Studies  Lab on 07/07/2022   Component Date Value Ref Range Status     Lactate Dehydrogenase 07/07/2022 226  140 - 271 U/L Final     Sodium 07/07/2022 138  134 - 144 mmol/L Final     Potassium 07/07/2022 4.1  3.5 - 5.1 mmol/L Final     Chloride 07/07/2022 103  98 - 107 mmol/L Final     Carbon Dioxide (CO2) 07/07/2022 30  21 - 31 mmol/L Final     Anion Gap 07/07/2022 5  3 - 14 mmol/L Final     Urea Nitrogen 07/07/2022 14  7 - 25 mg/dL Final     Creatinine 07/07/2022 0.96  0.60 - 1.20 mg/dL Final     Calcium 07/07/2022 9.4  8.6 - 10.3 mg/dL Final     Glucose 07/07/2022 81  70 - 105 mg/dL Final     Alkaline Phosphatase 07/07/2022 68  34 - 104 U/L Final     AST 07/07/2022 29  13 - 39 U/L Final     ALT 07/07/2022 18  7 - 52 U/L Final     Protein Total 07/07/2022 6.7  6.4 - 8.9 g/dL Final     Albumin 07/07/2022 4.3  3.5 - 5.7 g/dL Final     Bilirubin Total 07/07/2022 0.4  0.3 - 1.0 mg/dL Final     GFR Estimate 07/07/2022 64  >60 mL/min/1.73m2 Final    Effective December 21, 2021 eGFRcr in adults is calculated using the 2021 CKD-EPI creatinine equation which includes age and gender (Tom et al., NE, DOI: 10.1056/RXUExx8971100)     WBC Count 07/07/2022 3.1 (A) 4.0 - 11.0 10e3/uL Final     RBC Count 07/07/2022 3.23 (A) 3.80 - 5.20 10e6/uL Final     Hemoglobin 07/07/2022 11.4 (A) 11.7 - 15.7 g/dL Final     Hematocrit 07/07/2022 33.1 (A) 35.0 - 47.0 % Final     MCV 07/07/2022 103 (A) 78 - 100 fL Final     MCH 07/07/2022 35.3 (A) 26.5 - 33.0 pg Final     MCHC 07/07/2022 34.4  31.5 - 36.5 g/dL Final     RDW 07/07/2022 13.7  10.0 - 15.0 % Final     Platelet Count 07/07/2022 239  150 - 450 10e3/uL Final     % Neutrophils 07/07/2022 47  % Final     % Lymphocytes 07/07/2022 37  % Final     % Monocytes 07/07/2022 14  % Final     % Eosinophils 07/07/2022 1  % Final     % Basophils 07/07/2022 1  % Final     % Immature Granulocytes 07/07/2022 0  % Final     NRBCs per 100 WBC 07/07/2022 0  <1 /100 Final      Absolute Neutrophils 07/07/2022 1.5 (A) 1.6 - 8.3 10e3/uL Final     Absolute Lymphocytes 07/07/2022 1.2  0.8 - 5.3 10e3/uL Final     Absolute Monocytes 07/07/2022 0.4  0.0 - 1.3 10e3/uL Final     Absolute Eosinophils 07/07/2022 0.0  0.0 - 0.7 10e3/uL Final     Absolute Basophils 07/07/2022 0.0  0.0 - 0.2 10e3/uL Final     Absolute Immature Granulocytes 07/07/2022 0.0  <=0.4 10e3/uL Final     Absolute NRBCs 07/07/2022 0.0  10e3/uL Final     METHODOLOGY 07/07/2022    Final                    Value:This result contains rich text formatting which cannot be displayed here.     RESULTS 07/07/2022    Final                    Value:This result contains rich text formatting which cannot be displayed here.     INTERPRETATION 07/07/2022    Final                    Value:This result contains rich text formatting which cannot be displayed here.     COMMENTS 07/07/2022    Final                    Value:This result contains rich text formatting which cannot be displayed here.     DISCLAIMER 07/07/2022    Final                    Value:This result contains rich text formatting which cannot be displayed here.     Specimen Description 07/07/2022    Final                    Value:This result contains rich text formatting which cannot be displayed here.        ASSESSMENT/PLAN:  Chronic myelogenous leukemia diagnosed in 2003 and currently with molecular hematologic and cytogenetic remission on imatinib 400 mg daily. Labs done on 7/7/22 show a WBC of 3.1, ANC is 1.5. Hemoglobin is 11.4, platelets are normal. BCR ABL1 is undetectable.  All other labs are stable. Labs were reviewed with Dr Rosales. Discussed plan for ongoing treatment and follow up. Patient will continue on the Gleevec at this time. She will see Dr Rosales in 4 months to discuss plan for ongoing therapy. Will see patient in 4 months with CBC, CMP, LDH, and BCR-ABL transcript, P210 and 190.      Thirty minutes spent with this encounter with time spent reviewing  patient records, counseling patient regarding disease process, interpretation and review of labs with patient, obtaining a review of systems, performing a physical exam, discussing plan for follow up, renewing medication, documenting in EHR and coordination of care

## 2022-07-14 NOTE — NURSING NOTE
Chief Complaint   Patient presents with     Oncology Clinic Visit     F/U CML     Medication Reconciliation: complete    Yumiko Cruz CMA ([facility])

## 2022-08-18 ENCOUNTER — TRANSFERRED RECORDS (OUTPATIENT)
Dept: HEALTH INFORMATION MANAGEMENT | Facility: OTHER | Age: 67
End: 2022-08-18

## 2022-09-24 ENCOUNTER — HEALTH MAINTENANCE LETTER (OUTPATIENT)
Age: 67
End: 2022-09-24

## 2022-11-03 ENCOUNTER — LAB (OUTPATIENT)
Dept: LAB | Facility: OTHER | Age: 67
End: 2022-11-03
Attending: NURSE PRACTITIONER
Payer: MEDICARE

## 2022-11-03 DIAGNOSIS — C92.11 CHRONIC MYELOID LEUKEMIA IN REMISSION (H): ICD-10-CM

## 2022-11-03 LAB
ALBUMIN SERPL-MCNC: 4.3 G/DL (ref 3.5–5.7)
ALP SERPL-CCNC: 66 U/L (ref 34–104)
ALT SERPL W P-5'-P-CCNC: 21 U/L (ref 7–52)
ANION GAP SERPL CALCULATED.3IONS-SCNC: 7 MMOL/L (ref 3–14)
AST SERPL W P-5'-P-CCNC: 29 U/L (ref 13–39)
BASOPHILS # BLD AUTO: 0 10E3/UL (ref 0–0.2)
BASOPHILS NFR BLD AUTO: 1 %
BILIRUB SERPL-MCNC: 0.4 MG/DL (ref 0.3–1)
BUN SERPL-MCNC: 15 MG/DL (ref 7–25)
CALCIUM SERPL-MCNC: 9.4 MG/DL (ref 8.6–10.3)
CHLORIDE BLD-SCNC: 100 MMOL/L (ref 98–107)
CO2 SERPL-SCNC: 28 MMOL/L (ref 21–31)
CREAT SERPL-MCNC: 1.08 MG/DL (ref 0.6–1.2)
EOSINOPHIL # BLD AUTO: 0.1 10E3/UL (ref 0–0.7)
EOSINOPHIL NFR BLD AUTO: 2 %
ERYTHROCYTE [DISTWIDTH] IN BLOOD BY AUTOMATED COUNT: 14.1 % (ref 10–15)
GFR SERPL CREATININE-BSD FRML MDRD: 56 ML/MIN/1.73M2
GLUCOSE BLD-MCNC: 79 MG/DL (ref 70–105)
HCT VFR BLD AUTO: 33.5 % (ref 35–47)
HGB BLD-MCNC: 11.6 G/DL (ref 11.7–15.7)
IMM GRANULOCYTES # BLD: 0 10E3/UL
IMM GRANULOCYTES NFR BLD: 0 %
LAB DIRECTOR COMMENTS: NORMAL
LAB DIRECTOR COMMENTS: NORMAL
LAB DIRECTOR DISCLAIMER: NORMAL
LAB DIRECTOR DISCLAIMER: NORMAL
LAB DIRECTOR INTERPRETATION: NORMAL
LAB DIRECTOR INTERPRETATION: NORMAL
LAB DIRECTOR METHODOLOGY: NORMAL
LAB DIRECTOR METHODOLOGY: NORMAL
LAB DIRECTOR RESULTS: NORMAL
LAB DIRECTOR RESULTS: NORMAL
LDH SERPL L TO P-CCNC: 232 U/L (ref 140–271)
LYMPHOCYTES # BLD AUTO: 1.1 10E3/UL (ref 0.8–5.3)
LYMPHOCYTES NFR BLD AUTO: 34 %
MCH RBC QN AUTO: 35.5 PG (ref 26.5–33)
MCHC RBC AUTO-ENTMCNC: 34.6 G/DL (ref 31.5–36.5)
MCV RBC AUTO: 102 FL (ref 78–100)
MONOCYTES # BLD AUTO: 0.4 10E3/UL (ref 0–1.3)
MONOCYTES NFR BLD AUTO: 13 %
NEUTROPHILS # BLD AUTO: 1.6 10E3/UL (ref 1.6–8.3)
NEUTROPHILS NFR BLD AUTO: 50 %
NRBC # BLD AUTO: 0 10E3/UL
NRBC BLD AUTO-RTO: 0 /100
PLATELET # BLD AUTO: 251 10E3/UL (ref 150–450)
POTASSIUM BLD-SCNC: 3.8 MMOL/L (ref 3.5–5.1)
PROT SERPL-MCNC: 7.1 G/DL (ref 6.4–8.9)
RBC # BLD AUTO: 3.27 10E6/UL (ref 3.8–5.2)
SODIUM SERPL-SCNC: 135 MMOL/L (ref 134–144)
SPECIMEN DESCRIPTION: NORMAL
SPECIMEN DESCRIPTION: NORMAL
WBC # BLD AUTO: 3.2 10E3/UL (ref 4–11)

## 2022-11-03 PROCEDURE — 81206 BCR/ABL1 GENE MAJOR BP: CPT | Mod: ZL

## 2022-11-03 PROCEDURE — 85025 COMPLETE CBC W/AUTO DIFF WBC: CPT | Mod: ZL

## 2022-11-03 PROCEDURE — 36415 COLL VENOUS BLD VENIPUNCTURE: CPT | Mod: ZL

## 2022-11-03 PROCEDURE — 81207 BCR/ABL1 GENE MINOR BP: CPT | Mod: ZL

## 2022-11-03 PROCEDURE — 83615 LACTATE (LD) (LDH) ENZYME: CPT | Mod: ZL

## 2022-11-03 PROCEDURE — 80053 COMPREHEN METABOLIC PANEL: CPT | Mod: ZL

## 2022-11-04 ENCOUNTER — TELEPHONE (OUTPATIENT)
Dept: ONCOLOGY | Facility: OTHER | Age: 67
End: 2022-11-04

## 2022-11-04 NOTE — TELEPHONE ENCOUNTER
Free Drug Application Initiated  Medication: Gleecec 400mg  Sponsor: [facility]  Phone #: [phone]  Fax #: [phone]  Additional Information: application filled out and sent over to MARIIA Ralph NP to sign

## 2022-11-10 ENCOUNTER — ONCOLOGY VISIT (OUTPATIENT)
Dept: ONCOLOGY | Facility: OTHER | Age: 67
End: 2022-11-10
Attending: INTERNAL MEDICINE
Payer: MEDICARE

## 2022-11-10 VITALS
RESPIRATION RATE: 14 BRPM | DIASTOLIC BLOOD PRESSURE: 68 MMHG | TEMPERATURE: 98.4 F | HEART RATE: 97 BPM | BODY MASS INDEX: 28.96 KG/M2 | SYSTOLIC BLOOD PRESSURE: 128 MMHG | WEIGHT: 174 LBS | OXYGEN SATURATION: 99 %

## 2022-11-10 DIAGNOSIS — C92.11 CHRONIC MYELOID LEUKEMIA IN REMISSION (H): Primary | ICD-10-CM

## 2022-11-10 PROCEDURE — 99215 OFFICE O/P EST HI 40 MIN: CPT | Performed by: INTERNAL MEDICINE

## 2022-11-10 PROCEDURE — G0463 HOSPITAL OUTPT CLINIC VISIT: HCPCS | Performed by: INTERNAL MEDICINE

## 2022-11-10 PROCEDURE — 99417 PROLNG OP E/M EACH 15 MIN: CPT | Performed by: INTERNAL MEDICINE

## 2022-11-10 ASSESSMENT — PAIN SCALES - GENERAL: PAINLEVEL: MILD PAIN (3)

## 2022-11-10 NOTE — PROGRESS NOTES
Visit Date: 11/10/2022    HEMATOLOGY/ONCOLOGY CLINIC NOTE     HISTORY OF PRESENT ILLNESS:  Mrs. Norwood returns for followup of chronic myelogenous leukemia.  We had seen the patient in consultation at the request of Dr. Esquivel back on 11/11/2020.  At that time, she was a 65-year-old white who had presented while living in [place] in 2003 with hyperleukocytosis and splenomegaly and was diagnosed CML, BCR/ABL translocation of the p210 gene.  She was treated initially with Hydrea by Dr. Asad Escobar at [place] [facility], and then placed on Gleevec at a dose of 400 mg p.o. daily.  She was followed by a nurse practitioner in Hematology/Oncology in [place] and had moved to [place] and wanted to establish care with us.  She said she had been in hematologic, cytogenetic and molecular remission over the past 15 years.  She has tolerated imatinib well.  She had some problems with diarrhea.  She was switched to generic Gleevec and then symptoms seemed to improve, but she did get bouts of nausea and diarrhea, but this had improved over time.  She has been followed since then with CBCs on a regular basis.  She continued to be in molecular and cytogenetic response.  She was last seen by Danae Vazquez, nurse practitioner, on 07/14/2022.  There was concern that her white count was low at 3.1 in July, her ANC 1.5, hemoglobin was 11.4.  The patient was concerned about the mild anemia.  She is now here for followup.  She says she is tolerating Gleevec or imatinib well without any significant side effects.  No nausea, vomiting, abdominal pain, chest pain, bleeding episodes.  No fevers, night sweats, weight loss.  Overall, she is doing well.    PHYSICAL EXAMINATION:    GENERAL:  She is a well-developed, well-nourished, middle-aged white female in no acute distress. ECOG performance status is 0.  VITAL SIGNS:  Blood pressure 120/60, pulse 97, respiration 14, temp 98.4.  HEENT:  Atraumatic, normocephalic.   Oropharynx nonerythematous.  NECK:  Supple.  LUNGS:  Clear to auscultation and percussion.  HEART:  Regular rhythm.  S1, S2 normal.  ABDOMEN:  Soft, normoactive bowel sounds.  No spleen tip is palpable, nontender.  LYMPHATICS:  No cervical, supraclavicular, axillary or inguinal nodes.  EXTREMITIES:  No edema.   NEUROLOGIC:  Nonfocal.    LABORATORY DATA:  CBC with white count 3.2 with absolute neutrophil count 1.6, H and H 11.6 and 33.5, , platelet count is 251. BUN 15, creatinine 1.08.  LFTs are normal.  LDH is normal.  BCR/ABL major breakpoint p210 gene is not detectable consistent with complete molecular response and cytogenetic response.    IMPRESSION:  Chronic myelogenous leukemia diagnosed in , currently in molecular, hematologic and cytogenetic remission on imatinib 400 mg daily, with mild anemia, likely due to Gleevec.  Nonetheless, we will see the patient in 4 months, repeat CBC and obtain BCR/ABL transcript, reevaluate hemoglobin and obtain iron, TIBC, ferritin, B12, folate, sed rate, rheumatoid factor, LYUBOV and also peripheral blood smear.  Otherwise, she is tolerating Gleevec or imatinib well.  She will continue Gleevec 400 mg p.o. daily.    Seventy minutes was spent on this patient.  Time was spent reviewing multiple physician provider notes, lab results, imaging results, performing history and physical, documenting history and physical, and ordering followup labs.    Robin Rosales MD        D: 11/10/2022   T: 11/10/2022   MT: [id number]    Name:     EDDIE MCGRAW  MRN:      [id number]        Account:    [id number]   :      1955           Visit Date: 11/10/2022     Document: [id number]

## 2022-11-10 NOTE — NURSING NOTE
Chief Complaint   Patient presents with     Hematology     F/U CML     Medication Reconciliation: complete    Yumiko Cruz CMA ([facility])

## 2022-11-14 NOTE — TELEPHONE ENCOUNTER
Spoke to Rody at [employer]. Application is maybe there they are 3-5 days behind right now and to try back on Wed

## 2022-11-16 NOTE — TELEPHONE ENCOUNTER
Spoke to Taryn, at [employer]. She states she does not show the application has been received at this time, they are behind right now due to re-enrollments. Taryn states to try back in a few days to check the status.

## 2022-11-21 NOTE — TELEPHONE ENCOUNTER
Alison recieved fax from [facility] that they did not recieve physician sig/date or proof of income. I re-faxed the application and it does have physician signed and dated page so I don't know if it got cut off but sent to [phone]    Called and left vm for Alison to call back to get income info in

## 2022-11-21 NOTE — TELEPHONE ENCOUNTER
Spoke to Alison and she would like to email her tax return over to me and when I get it to call her to let her know it made it's way over to me

## 2022-11-30 DIAGNOSIS — C92.11 CHRONIC MYELOID LEUKEMIA IN REMISSION (H): Primary | ICD-10-CM

## 2022-11-30 RX ORDER — IMATINIB MESYLATE 400 MG/1
400 TABLET, FILM COATED ORAL DAILY
Qty: 30 TABLET | Refills: 11 | Status: SHIPPED | OUTPATIENT
Start: 2022-11-30 | End: 2024-03-14

## 2022-12-01 ENCOUNTER — TELEPHONE (OUTPATIENT)
Dept: ONCOLOGY | Facility: OTHER | Age: 67
End: 2022-12-01

## 2022-12-01 NOTE — TELEPHONE ENCOUNTER
Reason for call: Medication or medication refill    Name of medication requested: Gleevec    Are you out of the medication? yes    What pharmacy do you use? Mail order    Preferred method for responding to this message: Telephone call    Phone number patient can be reached at: Cell number on file:    Telephone Information:   Mobile [phone]       If we cannot reach you directly, may we leave a detailed response at the number you provided? Yes      Prachi Guadarrama on 12/1/2022 at 9:19 AM

## 2022-12-09 NOTE — TELEPHONE ENCOUNTER
Spoke to India at [facility]. Application is missing, income is in, script is in will fax again to [phone] call back on Wednesday

## 2022-12-20 NOTE — TELEPHONE ENCOUNTER
Spoke to Kiko at [employer]. Application is in, but not marked that it is and was told to call back on Thursday to check and make sure all the parts are there

## 2022-12-21 DIAGNOSIS — C92.11 CHRONIC MYELOID LEUKEMIA IN REMISSION (H): Primary | ICD-10-CM

## 2022-12-21 RX ORDER — IMATINIB MESYLATE 400 MG/1
400 TABLET, FILM COATED ORAL DAILY
Qty: 30 TABLET | Refills: 11 | Status: SHIPPED | OUTPATIENT
Start: 2022-12-21 | End: 2024-03-14

## 2022-12-23 NOTE — TELEPHONE ENCOUNTER
Spoke to Genevieve at [employer]. Application is in, and renewal starts 1/2/23 and should be done within a week or two

## 2022-12-28 ENCOUNTER — TELEPHONE (OUTPATIENT)
Dept: ONCOLOGY | Facility: OTHER | Age: 67
End: 2022-12-28

## 2022-12-28 NOTE — TELEPHONE ENCOUNTER
Sent 12/21 to [place] by our [facility] team  Debora Freeman RN...........12/28/2022 10:21 AM

## 2022-12-28 NOTE — TELEPHONE ENCOUNTER
Patient talked to a nurse in Oncology on Dec.1st and is calling to make sure you sent her script for Gleevec to [facility]. Please call today

## 2023-01-09 NOTE — TELEPHONE ENCOUNTER
01/09/23 free drug approved 12/22/22 through 12/31/23  They said the approval fax was sent to MD at Fax # [phone], and they cannot refax to our Fax # [phone]. Verified over the phone.    Free Drug Application Approved  Effective Dates: 12/22/22 - 12/31/23  Patient notified: yes  Additional Information: see above    Thank You!  Edie Morris CPhT  Lead Oral Oncology/Transplant Liaison  [facility]  Phone# [phone]  Fax# [phone]    [email or web address]

## 2023-01-29 ENCOUNTER — HEALTH MAINTENANCE LETTER (OUTPATIENT)
Age: 68
End: 2023-01-29

## 2023-03-09 ENCOUNTER — LAB (OUTPATIENT)
Dept: LAB | Facility: OTHER | Age: 68
End: 2023-03-09
Attending: INTERNAL MEDICINE
Payer: MEDICARE

## 2023-03-09 DIAGNOSIS — C92.11 CHRONIC MYELOID LEUKEMIA IN REMISSION (H): ICD-10-CM

## 2023-03-09 LAB
ALBUMIN SERPL BCG-MCNC: 4.6 G/DL (ref 3.5–5.2)
ALP SERPL-CCNC: 87 U/L (ref 35–104)
ALT SERPL W P-5'-P-CCNC: 31 U/L (ref 10–35)
ANION GAP SERPL CALCULATED.3IONS-SCNC: 7 MMOL/L (ref 7–15)
AST SERPL W P-5'-P-CCNC: 37 U/L (ref 10–35)
BASOPHILS # BLD AUTO: 0 10E3/UL (ref 0–0.2)
BASOPHILS NFR BLD AUTO: 1 %
BILIRUB SERPL-MCNC: 0.3 MG/DL
BUN SERPL-MCNC: 14.2 MG/DL (ref 8–23)
CALCIUM SERPL-MCNC: 9.4 MG/DL (ref 8.8–10.2)
CHLORIDE SERPL-SCNC: 99 MMOL/L (ref 98–107)
CREAT SERPL-MCNC: 0.92 MG/DL (ref 0.51–0.95)
DEPRECATED HCO3 PLAS-SCNC: 29 MMOL/L (ref 22–29)
EOSINOPHIL # BLD AUTO: 0 10E3/UL (ref 0–0.7)
EOSINOPHIL NFR BLD AUTO: 1 %
ERYTHROCYTE [DISTWIDTH] IN BLOOD BY AUTOMATED COUNT: 13.9 % (ref 10–15)
ERYTHROCYTE [SEDIMENTATION RATE] IN BLOOD BY WESTERGREN METHOD: 7 MM/HR (ref 0–30)
FERRITIN SERPL-MCNC: 68 NG/ML (ref 11–328)
FOLATE SERPL-MCNC: 7.3 NG/ML (ref 4.6–34.8)
GFR SERPL CREATININE-BSD FRML MDRD: 67 ML/MIN/1.73M2
GLUCOSE SERPL-MCNC: 99 MG/DL (ref 70–99)
HCT VFR BLD AUTO: 33.2 % (ref 35–47)
HGB BLD-MCNC: 11.7 G/DL (ref 11.7–15.7)
IMM GRANULOCYTES # BLD: 0 10E3/UL
IMM GRANULOCYTES NFR BLD: 0 %
IRON BINDING CAPACITY (ROCHE): 312 UG/DL (ref 240–430)
IRON SATN MFR SERPL: 23 % (ref 15–46)
IRON SERPL-MCNC: 73 UG/DL (ref 37–145)
LAB DIRECTOR COMMENTS: NORMAL
LAB DIRECTOR COMMENTS: NORMAL
LAB DIRECTOR DISCLAIMER: NORMAL
LAB DIRECTOR DISCLAIMER: NORMAL
LAB DIRECTOR INTERPRETATION: NORMAL
LAB DIRECTOR INTERPRETATION: NORMAL
LAB DIRECTOR METHODOLOGY: NORMAL
LAB DIRECTOR METHODOLOGY: NORMAL
LAB DIRECTOR RESULTS: NORMAL
LAB DIRECTOR RESULTS: NORMAL
LDH SERPL L TO P-CCNC: 257 U/L (ref 0–250)
LYMPHOCYTES # BLD AUTO: 1.3 10E3/UL (ref 0.8–5.3)
LYMPHOCYTES NFR BLD AUTO: 39 %
MCH RBC QN AUTO: 35 PG (ref 26.5–33)
MCHC RBC AUTO-ENTMCNC: 35.2 G/DL (ref 31.5–36.5)
MCV RBC AUTO: 99 FL (ref 78–100)
MONOCYTES # BLD AUTO: 0.4 10E3/UL (ref 0–1.3)
MONOCYTES NFR BLD AUTO: 12 %
NEUTROPHILS # BLD AUTO: 1.6 10E3/UL (ref 1.6–8.3)
NEUTROPHILS NFR BLD AUTO: 47 %
NRBC # BLD AUTO: 0 10E3/UL
NRBC BLD AUTO-RTO: 0 /100
PLATELET # BLD AUTO: 238 10E3/UL (ref 150–450)
POTASSIUM SERPL-SCNC: 3.9 MMOL/L (ref 3.4–5.3)
PROT SERPL-MCNC: 7.3 G/DL (ref 6.4–8.3)
RBC # BLD AUTO: 3.34 10E6/UL (ref 3.8–5.2)
RETICS # AUTO: 0.06 10E6/UL (ref 0.03–0.1)
RETICS/RBC NFR AUTO: 1.6 % (ref 0.5–2)
SODIUM SERPL-SCNC: 135 MMOL/L (ref 136–145)
SPECIMEN DESCRIPTION: NORMAL
SPECIMEN DESCRIPTION: NORMAL
VIT B12 SERPL-MCNC: 175 PG/ML (ref 232–1245)
WBC # BLD AUTO: 3.4 10E3/UL (ref 4–11)

## 2023-03-09 PROCEDURE — 83615 LACTATE (LD) (LDH) ENZYME: CPT | Mod: ZL

## 2023-03-09 PROCEDURE — 82607 VITAMIN B-12: CPT | Mod: ZL

## 2023-03-09 PROCEDURE — 36415 COLL VENOUS BLD VENIPUNCTURE: CPT | Mod: ZL

## 2023-03-09 PROCEDURE — 85045 AUTOMATED RETICULOCYTE COUNT: CPT | Mod: ZL

## 2023-03-09 PROCEDURE — 81206 BCR/ABL1 GENE MAJOR BP: CPT | Mod: ZL

## 2023-03-09 PROCEDURE — 85004 AUTOMATED DIFF WBC COUNT: CPT | Mod: ZL

## 2023-03-09 PROCEDURE — 82728 ASSAY OF FERRITIN: CPT | Mod: ZL

## 2023-03-09 PROCEDURE — G0452 MOLECULAR PATHOLOGY INTERPR: HCPCS | Mod: 26 | Performed by: PATHOLOGY

## 2023-03-09 PROCEDURE — 82746 ASSAY OF FOLIC ACID SERUM: CPT | Mod: ZL

## 2023-03-09 PROCEDURE — 80053 COMPREHEN METABOLIC PANEL: CPT | Mod: ZL

## 2023-03-09 PROCEDURE — 86431 RHEUMATOID FACTOR QUANT: CPT | Mod: ZL

## 2023-03-09 PROCEDURE — 85652 RBC SED RATE AUTOMATED: CPT | Mod: ZL

## 2023-03-09 PROCEDURE — 81207 BCR/ABL1 GENE MINOR BP: CPT | Mod: ZL

## 2023-03-09 PROCEDURE — 83550 IRON BINDING TEST: CPT | Mod: ZL

## 2023-03-10 ENCOUNTER — MYC MEDICAL ADVICE (OUTPATIENT)
Dept: ONCOLOGY | Facility: OTHER | Age: 68
End: 2023-03-10
Payer: MEDICARE

## 2023-03-10 LAB
PATH REPORT.FINAL DX SPEC: NORMAL
RHEUMATOID FACT SER NEPH-ACNC: 10 IU/ML

## 2023-03-10 NOTE — TELEPHONE ENCOUNTER
Spoke with patient. All questions regarding labs were answered. Patient will see Dr Rosales to review labs in a couple weeks.

## 2023-03-22 ENCOUNTER — ONCOLOGY VISIT (OUTPATIENT)
Dept: ONCOLOGY | Facility: OTHER | Age: 68
End: 2023-03-22
Attending: INTERNAL MEDICINE
Payer: MEDICARE

## 2023-03-22 ENCOUNTER — DOCUMENTATION ONLY (OUTPATIENT)
Dept: INFUSION THERAPY | Facility: CLINIC | Age: 68
End: 2023-03-22

## 2023-03-22 VITALS
OXYGEN SATURATION: 100 % | BODY MASS INDEX: 29.45 KG/M2 | TEMPERATURE: 97.5 F | DIASTOLIC BLOOD PRESSURE: 72 MMHG | SYSTOLIC BLOOD PRESSURE: 138 MMHG | HEART RATE: 82 BPM | WEIGHT: 177 LBS | RESPIRATION RATE: 16 BRPM

## 2023-03-22 DIAGNOSIS — C92.11 CHRONIC MYELOID LEUKEMIA IN REMISSION (H): Primary | ICD-10-CM

## 2023-03-22 DIAGNOSIS — R79.89 LOW VITAMIN B12 LEVEL: ICD-10-CM

## 2023-03-22 LAB
BASOPHILS # BLD AUTO: 0 10E3/UL (ref 0–0.2)
BASOPHILS NFR BLD AUTO: 1 %
EOSINOPHIL # BLD AUTO: 0 10E3/UL (ref 0–0.7)
EOSINOPHIL NFR BLD AUTO: 1 %
ERYTHROCYTE [DISTWIDTH] IN BLOOD BY AUTOMATED COUNT: 14.2 % (ref 10–15)
HCT VFR BLD AUTO: 32.9 % (ref 35–47)
HGB BLD-MCNC: 11.5 G/DL (ref 11.7–15.7)
IMM GRANULOCYTES # BLD: 0 10E3/UL
IMM GRANULOCYTES NFR BLD: 0 %
LYMPHOCYTES # BLD AUTO: 1.2 10E3/UL (ref 0.8–5.3)
LYMPHOCYTES NFR BLD AUTO: 40 %
MCH RBC QN AUTO: 35 PG (ref 26.5–33)
MCHC RBC AUTO-ENTMCNC: 35 G/DL (ref 31.5–36.5)
MCV RBC AUTO: 100 FL (ref 78–100)
MONOCYTES # BLD AUTO: 0.4 10E3/UL (ref 0–1.3)
MONOCYTES NFR BLD AUTO: 13 %
NEUTROPHILS # BLD AUTO: 1.4 10E3/UL (ref 1.6–8.3)
NEUTROPHILS NFR BLD AUTO: 45 %
NRBC # BLD AUTO: 0 10E3/UL
NRBC BLD AUTO-RTO: 0 /100
PLATELET # BLD AUTO: 221 10E3/UL (ref 150–450)
RBC # BLD AUTO: 3.29 10E6/UL (ref 3.8–5.2)
VIT B12 SERPL-MCNC: 316 PG/ML (ref 232–1245)
WBC # BLD AUTO: 3 10E3/UL (ref 4–11)

## 2023-03-22 PROCEDURE — G0463 HOSPITAL OUTPT CLINIC VISIT: HCPCS

## 2023-03-22 PROCEDURE — 82607 VITAMIN B-12: CPT | Mod: ZL | Performed by: INTERNAL MEDICINE

## 2023-03-22 PROCEDURE — 85025 COMPLETE CBC W/AUTO DIFF WBC: CPT | Mod: ZL | Performed by: INTERNAL MEDICINE

## 2023-03-22 PROCEDURE — 36415 COLL VENOUS BLD VENIPUNCTURE: CPT | Mod: ZL | Performed by: INTERNAL MEDICINE

## 2023-03-22 PROCEDURE — 99215 OFFICE O/P EST HI 40 MIN: CPT | Performed by: INTERNAL MEDICINE

## 2023-03-22 RX ORDER — LANOLIN ALCOHOL/MO/W.PET/CERES
1000 CREAM (GRAM) TOPICAL DAILY
COMMUNITY
Start: 2023-03-10

## 2023-03-22 ASSESSMENT — PAIN SCALES - GENERAL: PAINLEVEL: NO PAIN (0)

## 2023-03-22 NOTE — PROGRESS NOTES
Visit Date: 03/22/2023    HISTORY OF PRESENT ILLNESS:  Ms. Norwood returns for followup of chronic myelogenous leukemia.  We had originally seen the patient in consultation at the request of Dr. Esquivel back on 11/11/2020.  At that time, she was a 65-year-old white female who had presented while living in [place] in 2003 with hyperleukocytosis and splenomegaly and was diagnosed with CML with BCR/ABL translocation of p210 gene.  She was treated initially with Hydrea by Dr. Asad Escobar at [place] [facility] and then placed on Gleevec at a dose of 400 mg p.o. daily.  She was followed by a nurse practitioner in Hematology/Oncology at [place], and moved to [place] and wanted to establish care with us.  She said she had been in hematologic cytogenetic and molecular remission over the past 15 years.  She was tolerating imatinib well.  She had some problems with diarrhea.  She was switched over to generic Gleevec and her symptoms seemed to improve, but she did get occasional bouts of nausea and diarrhea, but this did improve over time.  She continued to be in molecular cytogenetic response.  She was subsequently seen by Danae Vazquez, nurse practitioner, on 07/14/2022 and there was concern about a white count being low at 3.1.  In July, her ANC was 1.5, hemoglobin 11.4.  The patient was concerned about her mild anemia.  When we saw the patient last on 11/10/2022, the plan was to obtain a peripheral blood smear and obtain iron studies, B12, folate, sed rate, rheumatoid factor, LYUBOV.  Peripheral blood smear was unremarkable.  No evidence of blasts.  Her ferritin came back normal at 68, but her B12 level came back low at 175.  She was placed on oral B12, she has been on it for 2 weeks, 1000 mcg p.o. daily.  Overall, she feels well.  She gets occasional fatigue, but she attributes this to her age.  No fevers, night sweats, weight loss or early satiety.  No significant diarrhea.  She denies any history of  pernicious anemia or history of gastric bypass procedure.    PHYSICAL EXAMINATION:    GENERAL:  She is a middle-aged white female in no acute distress, ECOG performance status is 0.  VITAL SIGNS:  Reveal blood pressure 130/72, pulse 82, respirations 16, temperature 97.5.  HEENT:  Atraumatic, normocephalic.  Oropharynx nonerythematous.  NECK:  Supple, no thyromegaly.  LUNGS:  Clear to auscultation and percussion.  HEART:  Regular rhythm.  S1, S2 normal.  ABDOMEN:  Soft, normoactive bowel sounds.  No masses.  No splenomegaly.  LYMPHATICS:  No cervical, supraclavicular, axillary or inguinal nodes.  EXTREMITIES:  No edema.  NEUROLOGIC:  Nonfocal.    LABORATORY DATA:  Reveal CBC with white count 3.0 with ANC of 1.4.  H and H of 11.5 and 32.9, platelet count is 221.  Vitamin B12 level was 316.  BCR/ABL p210  was undetectable.    IMPRESSION:  Chronic myelogenous leukemia diagnosed in , chronic phase.  The patient is currently in molecular hematologic cytogenetic remission on imatinib 400 mg daily.  She has a mild anemia with underlying B12 deficiency of unknown etiology.  She was responding to oral B12.  We will continue oral B12 at 1000 mcg p.o. daily and see the patient in 3 months, repeat CBC, CMP, LDH, B12 level and BCR/ABL transcript p210.      TIME SPENT:  Eighty minutes were spent on this patient.  Time was spent reviewing multiple physician provider notes, lab results, imaging results, discussing the role of B12 deficiency and anemia, performing history and physical, documenting history and physical and ordering followup labs and scans.    Robin Rosales MD        D: 2023   T: 2023   MT: RONAL    Name:     EDDIE MCGRAW  MRN:      [id number]        Account:    [id number]   :      1955           Visit Date: 2023     Document: [id number]    cc:  Felix Esquivel MD

## 2023-03-22 NOTE — PROGRESS NOTES
Oral Chemotherapy Program  Lab review     Reviewed labs from 3/22/2023.     Labs are unremarkable and do not require any dose adjustments at this time     Follow-up/plan  6/22/2023: Review labs  6/29/2023: Review oncology appointment with Dr. Connie King, PharmD  PGY1 Ambulatory Care Resident

## 2023-03-22 NOTE — NURSING NOTE
Chief Complaint   Patient presents with     Oncology Clinic Visit     F/U Chronic myeloid leukemia     Medication Reconciliation: complete    Yumiko Cruz CMA ([facility])

## 2023-03-27 ENCOUNTER — DOCUMENTATION ONLY (OUTPATIENT)
Dept: OTHER | Facility: CLINIC | Age: 68
End: 2023-03-27
Payer: MEDICARE

## 2023-06-22 ENCOUNTER — LAB (OUTPATIENT)
Dept: LAB | Facility: OTHER | Age: 68
End: 2023-06-22
Attending: INTERNAL MEDICINE
Payer: MEDICARE

## 2023-06-22 DIAGNOSIS — R79.89 LOW VITAMIN B12 LEVEL: ICD-10-CM

## 2023-06-22 DIAGNOSIS — C92.11 CHRONIC MYELOID LEUKEMIA IN REMISSION (H): ICD-10-CM

## 2023-06-22 LAB
ALBUMIN SERPL BCG-MCNC: 4.4 G/DL (ref 3.5–5.2)
ALP SERPL-CCNC: 87 U/L (ref 35–104)
ALT SERPL W P-5'-P-CCNC: 18 U/L (ref 0–50)
ANION GAP SERPL CALCULATED.3IONS-SCNC: 6 MMOL/L (ref 7–15)
AST SERPL W P-5'-P-CCNC: 29 U/L (ref 0–45)
BASOPHILS # BLD AUTO: 0 10E3/UL (ref 0–0.2)
BASOPHILS NFR BLD AUTO: 1 %
BILIRUB SERPL-MCNC: 0.2 MG/DL
BUN SERPL-MCNC: 13.3 MG/DL (ref 8–23)
CALCIUM SERPL-MCNC: 9.1 MG/DL (ref 8.8–10.2)
CHLORIDE SERPL-SCNC: 99 MMOL/L (ref 98–107)
CREAT SERPL-MCNC: 0.94 MG/DL (ref 0.51–0.95)
DEPRECATED HCO3 PLAS-SCNC: 29 MMOL/L (ref 22–29)
EOSINOPHIL # BLD AUTO: 0.1 10E3/UL (ref 0–0.7)
EOSINOPHIL NFR BLD AUTO: 1 %
ERYTHROCYTE [DISTWIDTH] IN BLOOD BY AUTOMATED COUNT: 13.4 % (ref 10–15)
GFR SERPL CREATININE-BSD FRML MDRD: 65 ML/MIN/1.73M2
GLUCOSE SERPL-MCNC: 109 MG/DL (ref 70–99)
HCT VFR BLD AUTO: 32.3 % (ref 35–47)
HGB BLD-MCNC: 11.4 G/DL (ref 11.7–15.7)
HOLD SPECIMEN: NORMAL
IMM GRANULOCYTES # BLD: 0 10E3/UL
IMM GRANULOCYTES NFR BLD: 0 %
LAB DIRECTOR COMMENTS: NORMAL
LAB DIRECTOR COMMENTS: NORMAL
LAB DIRECTOR DISCLAIMER: NORMAL
LAB DIRECTOR DISCLAIMER: NORMAL
LAB DIRECTOR INTERPRETATION: NORMAL
LAB DIRECTOR INTERPRETATION: NORMAL
LAB DIRECTOR METHODOLOGY: NORMAL
LAB DIRECTOR METHODOLOGY: NORMAL
LAB DIRECTOR RESULTS: NORMAL
LAB DIRECTOR RESULTS: NORMAL
LDH SERPL L TO P-CCNC: 251 U/L (ref 0–250)
LYMPHOCYTES # BLD AUTO: 1.3 10E3/UL (ref 0.8–5.3)
LYMPHOCYTES NFR BLD AUTO: 24 %
MCH RBC QN AUTO: 35.5 PG (ref 26.5–33)
MCHC RBC AUTO-ENTMCNC: 35.3 G/DL (ref 31.5–36.5)
MCV RBC AUTO: 101 FL (ref 78–100)
MONOCYTES # BLD AUTO: 0.6 10E3/UL (ref 0–1.3)
MONOCYTES NFR BLD AUTO: 12 %
NEUTROPHILS # BLD AUTO: 3.2 10E3/UL (ref 1.6–8.3)
NEUTROPHILS NFR BLD AUTO: 62 %
NRBC # BLD AUTO: 0 10E3/UL
NRBC BLD AUTO-RTO: 0 /100
PLATELET # BLD AUTO: 179 10E3/UL (ref 150–450)
POTASSIUM SERPL-SCNC: 4.4 MMOL/L (ref 3.4–5.3)
PROT SERPL-MCNC: 6.9 G/DL (ref 6.4–8.3)
RBC # BLD AUTO: 3.21 10E6/UL (ref 3.8–5.2)
SODIUM SERPL-SCNC: 134 MMOL/L (ref 136–145)
SPECIMEN DESCRIPTION: NORMAL
SPECIMEN DESCRIPTION: NORMAL
VIT B12 SERPL-MCNC: 295 PG/ML (ref 232–1245)
WBC # BLD AUTO: 5.2 10E3/UL (ref 4–11)

## 2023-06-22 PROCEDURE — G0452 MOLECULAR PATHOLOGY INTERPR: HCPCS | Mod: 26 | Performed by: PATHOLOGY

## 2023-06-22 PROCEDURE — 36415 COLL VENOUS BLD VENIPUNCTURE: CPT | Mod: ZL

## 2023-06-22 PROCEDURE — 82607 VITAMIN B-12: CPT | Mod: ZL

## 2023-06-22 PROCEDURE — 83615 LACTATE (LD) (LDH) ENZYME: CPT | Mod: ZL

## 2023-06-22 PROCEDURE — 81206 BCR/ABL1 GENE MAJOR BP: CPT | Mod: ZL

## 2023-06-22 PROCEDURE — 85014 HEMATOCRIT: CPT | Mod: ZL

## 2023-06-22 PROCEDURE — 80053 COMPREHEN METABOLIC PANEL: CPT | Mod: ZL

## 2023-06-22 PROCEDURE — 81207 BCR/ABL1 GENE MINOR BP: CPT | Mod: ZL

## 2023-06-29 ENCOUNTER — ONCOLOGY VISIT (OUTPATIENT)
Dept: ONCOLOGY | Facility: OTHER | Age: 68
End: 2023-06-29
Attending: INTERNAL MEDICINE
Payer: MEDICARE

## 2023-06-29 VITALS
BODY MASS INDEX: 28.66 KG/M2 | HEIGHT: 65 IN | HEART RATE: 106 BPM | OXYGEN SATURATION: 99 % | SYSTOLIC BLOOD PRESSURE: 132 MMHG | TEMPERATURE: 97.8 F | DIASTOLIC BLOOD PRESSURE: 80 MMHG | WEIGHT: 172 LBS | RESPIRATION RATE: 18 BRPM

## 2023-06-29 DIAGNOSIS — C92.11 CHRONIC MYELOID LEUKEMIA IN REMISSION (H): Primary | ICD-10-CM

## 2023-06-29 PROCEDURE — G0463 HOSPITAL OUTPT CLINIC VISIT: HCPCS

## 2023-06-29 PROCEDURE — 99214 OFFICE O/P EST MOD 30 MIN: CPT | Performed by: NURSE PRACTITIONER

## 2023-06-29 ASSESSMENT — PAIN SCALES - GENERAL: PAINLEVEL: NO PAIN (0)

## 2023-06-29 NOTE — PROGRESS NOTES
Oncology Follow-up Visit:  June 29, 2023  Diagnosis:    History Of Present Illness:  Patient here for follow up of her CML. Patient was initially seen on 11/11/20 to evaluate concerning her diagnosis of chronic myelogenous leukemia.  Patient was initially diagnosed in 2003. 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  She was treated initially with Hydrea by Dr. Asad Escobar at [facility] and then placed on Gleevec or imatinib 400 mg p.o. daily.  She had been followed since then by the nurse practitioner in Hematology/Oncology in [facility].  Patient moved to the [facility] and established care with us. She has tolerated imatinib well. Initially, she had problems with diarrhea. She was switched to generic Gleevec and then symptoms seemed to improve. She continued to be in molecular cytogenetic response. Patient was seen on 07/14/2022 and there was concern about a white count being low at 3.1. In July 2022, her ANC was 1.5, hemoglobin 11.4. The patient was concerned about her mild anemia.  When patient was seen on 11/10/2022, the plan was to obtain a peripheral blood smear and obtain iron studies, B12, folate, sed rate, rheumatoid factor, LYUBOV.  Peripheral blood smear was unremarkable.  No evidence of blasts.  Her ferritin came back normal at 68, but her B12 level came back low at 175. She was placed on oral B12 and continues on this. Patient states she recently had an upper respiratory infection with sinus pain and pressure. She was placed on a Z-theresa and recently finished this. She is feeling better but does continue to have a loose cough. She denies any shortness of breath. Labs done on 6/22/23 show a wbc of 5.2, hemoglobin is 11.4. Sodium is just slightly decreased at 134. BCR ABL major is undetectable, BCR ABL minor is pending. All other labs are  stable. Patient has no other new concerns.       Review Of Systems:  Review Of Systems  Skin: denies any new skin changes  Ears/Nose/Throat: reports recent sinus pain/pressure, improved  Respiratory: reports cough due to recent URI, denies shortness of breath  Cardiovascular: denies chest pain   Gastrointestinal: denies abdominal pain, no bowel changes   Genitourinary: denies dysuria or hematuria  Musculoskeletal: denies new bone pain  Neurologic: denies headaches, no dizziness  Hematologic/Lymphatic/Immunologic: reports night sweats during recent illness, no fevers      Nursing Notes:   Yumiko Cruz CMA  6/29/2023  9:44 AM  Sign at exiting of workspace  Chief Complaint   Patient presents with     Oncology Clinic Visit     F/U Chronic myeloid leukemia     Medication Reconciliation: complete    Yumiko Cruz CMA ([facility])      Past medical, social, surgical, and family histories reviewed.    Allergies:  Allergies as of 06/29/2023     (No Known Allergies)       Current Medications:  Current Outpatient Medications   Medication Sig Dispense Refill     Calcium-Magnesium-Zinc 333-133-5 MG TABS per tablet Take 1 tablet by mouth daily       cholecalciferol 50 MCG (2000 UT) tablet Take 2,000 Units by mouth daily       cyanocobalamin (VITAMIN B-12) 1000 MCG tablet Take 1,000 mcg by mouth daily       imatinib (GLEEVEC) 400 MG tablet Take 1 tablet (400 mg) by mouth daily Take with a meal and a large glass of water. 30 tablet 4     Probiotic Product (ACIDOPHILUS PROBIOTIC BLEND) CAPS Take 1 capsule by mouth daily       famotidine (PEPCID) 40 MG tablet Take 40 mg by mouth daily as needed       imatinib (GLEEVEC) 400 MG tablet Take 1 tablet (400 mg) by mouth daily Take with a meal and a large glass of water. 30 tablet 11     imatinib (GLEEVEC) 400 MG tablet Take 1 tablet (400 mg) by mouth daily Take with a meal and a large glass of water. 30 tablet 11     imatinib (GLEEVEC) 400 MG tablet Take 1 tablet (400 mg) by mouth  "daily Take with a meal and a large glass of water. 30 tablet 7     imatinib (GLEEVEC) 400 MG TABS tablet Take 1 tablet (400 mg) by mouth daily 30 tablet 6     valACYclovir (VALTREX) 1000 mg tablet TAKE 2 TABLETS BY MOUTH AT BREAKOUT, THEN TAKE 2 MORE TABLETS 12 HOURS LATER          Physical Exam:  /80   Pulse 106   Temp 97.8  F (36.6  C) (Tympanic)   Resp 18   Ht 1.651 m (5' 5\")   Wt 78 kg (172 lb)   SpO2 99%   BMI 28.62 kg/m      GENERAL APPEARANCE: 68 year old female, alert and no distress     NECK: no adenopathy, no asymmetry or masses     LYMPHATICS: No cervical, supraclavicular, axillary lymphadenopathy     RESP: lungs clear to auscultation - no rales, rhonchi or wheezes     CARDIOVASCULAR: regular rates and rhythm, normal S1 S2     ABDOMEN:  soft, nontender, bowel sounds normal     MUSCULOSKELETAL: extremities normal- no gross deformities noted, No edema b/l LE.     SKIN: no suspicious lesions or rashes on exposed skin     PSYCHIATRIC: mentation appears normal and affect normal    Laboratory/Imaging Studies  Lab on 06/22/2023   Component Date Value Ref Range Status     Vitamin B12 06/22/2023 295  232 - 1,245 pg/mL Final     RESULTS 06/22/2023    Final                    Value:This result contains rich text formatting which cannot be displayed here.     INTERPRETATION 06/22/2023    Final                    Value:This result contains rich text formatting which cannot be displayed here.     COMMENTS 06/22/2023    Final                    Value:This result contains rich text formatting which cannot be displayed here.     METHODOLOGY 06/22/2023    Final                    Value:This result contains rich text formatting which cannot be displayed here.     DISCLAIMER 06/22/2023    Final                    Value:This result contains rich text formatting which cannot be displayed here.     Specimen Description 06/22/2023    Final                    Value:This result contains rich text formatting which " cannot be displayed here.     Sodium 06/22/2023 134 (L)  136 - 145 mmol/L Final     Potassium 06/22/2023 4.4  3.4 - 5.3 mmol/L Final     Chloride 06/22/2023 99  98 - 107 mmol/L Final     Carbon Dioxide (CO2) 06/22/2023 29  22 - 29 mmol/L Final     Anion Gap 06/22/2023 6 (L)  7 - 15 mmol/L Final     Urea Nitrogen 06/22/2023 13.3  8.0 - 23.0 mg/dL Final     Creatinine 06/22/2023 0.94  0.51 - 0.95 mg/dL Final     Calcium 06/22/2023 9.1  8.8 - 10.2 mg/dL Final     Glucose 06/22/2023 109 (H)  70 - 99 mg/dL Final     Alkaline Phosphatase 06/22/2023 87  35 - 104 U/L Final     AST 06/22/2023 29  0 - 45 U/L Final    Reference intervals for this test were updated on 6/12/2023 to more accurately reflect our healthy population. There may be differences in the flagging of prior results with similar values performed with this method. Interpretation of those prior results can be made in the context of the updated reference intervals.     ALT 06/22/2023 18  0 - 50 U/L Final    Reference intervals for this test were updated on 6/12/2023 to more accurately reflect our healthy population. There may be differences in the flagging of prior results with similar values performed with this method. Interpretation of those prior results can be made in the context of the updated reference intervals.       Protein Total 06/22/2023 6.9  6.4 - 8.3 g/dL Final     Albumin 06/22/2023 4.4  3.5 - 5.2 g/dL Final     Bilirubin Total 06/22/2023 0.2  <=1.2 mg/dL Final     GFR Estimate 06/22/2023 65  >60 mL/min/1.73m2 Final     Lactate Dehydrogenase 06/22/2023 251 (H)  0 - 250 U/L Final     WBC Count 06/22/2023 5.2  4.0 - 11.0 10e3/uL Final     RBC Count 06/22/2023 3.21 (L)  3.80 - 5.20 10e6/uL Final     Hemoglobin 06/22/2023 11.4 (L)  11.7 - 15.7 g/dL Final     Hematocrit 06/22/2023 32.3 (L)  35.0 - 47.0 % Final     MCV 06/22/2023 101 (H)  78 - 100 fL Final     MCH 06/22/2023 35.5 (H)  26.5 - 33.0 pg Final     MCHC 06/22/2023 35.3  31.5 - 36.5 g/dL Final      RDW 06/22/2023 13.4  10.0 - 15.0 % Final     Platelet Count 06/22/2023 179  150 - 450 10e3/uL Final     % Neutrophils 06/22/2023 62  % Final     % Lymphocytes 06/22/2023 24  % Final     % Monocytes 06/22/2023 12  % Final     % Eosinophils 06/22/2023 1  % Final     % Basophils 06/22/2023 1  % Final     % Immature Granulocytes 06/22/2023 0  % Final     NRBCs per 100 WBC 06/22/2023 0  <1 /100 Final     Absolute Neutrophils 06/22/2023 3.2  1.6 - 8.3 10e3/uL Final     Absolute Lymphocytes 06/22/2023 1.3  0.8 - 5.3 10e3/uL Final     Absolute Monocytes 06/22/2023 0.6  0.0 - 1.3 10e3/uL Final     Absolute Eosinophils 06/22/2023 0.1  0.0 - 0.7 10e3/uL Final     Absolute Basophils 06/22/2023 0.0  0.0 - 0.2 10e3/uL Final     Absolute Immature Granulocytes 06/22/2023 0.0  <=0.4 10e3/uL Final     Absolute NRBCs 06/22/2023 0.0  10e3/uL Final     Hold Specimen 06/22/2023 [facility]   Final        ASSESSMENT/PLAN:  Chronic myelogenous leukemia diagnosed in 2003, chronic phase.  The patient is currently in molecular hematologic cytogenetic remission on imatinib 400 mg daily.  She was found to have a mild anemia with underlying B12 deficiency of unknown etiology. She will continue oral B12 at 1000 mcg p.o. daily. Labs done on 6/22/23 show a wbc of 5.2, hemoglobin is 11.4. Sodium is just slightly decreased at 134. BCR ABL major is undetectable, BCR ABL minor is pending. All other labs are stable. We will see the patient in 3 months, repeat CBC, CMP, LDH, B12 level and BCR/ABL transcripts. Patient will be seen if cough continues or is she develops any new symptoms    Thirty two minutes spent with this encounter with time spent reviewing patient records, counseling patient regarding disease process, interpretation and review of labs with patient, discussing recent URI symptoms, discussing plan for follow up, obtaining a review of systems, performing a physical exam, ordering tests, documenting in EHR and coordination of care

## 2023-06-29 NOTE — NURSING NOTE
Chief Complaint   Patient presents with     Oncology Clinic Visit     F/U Chronic myeloid leukemia     Medication Reconciliation: complete    Yumiko Cruz CMA ([facility])

## 2023-09-22 ENCOUNTER — LAB (OUTPATIENT)
Dept: LAB | Facility: OTHER | Age: 68
End: 2023-09-22
Attending: NURSE PRACTITIONER
Payer: MEDICARE

## 2023-09-22 DIAGNOSIS — C92.11 CHRONIC MYELOID LEUKEMIA IN REMISSION (H): ICD-10-CM

## 2023-09-22 LAB
ALBUMIN SERPL BCG-MCNC: 4.3 G/DL (ref 3.5–5.2)
ALP SERPL-CCNC: 86 U/L (ref 35–104)
ALT SERPL W P-5'-P-CCNC: 22 U/L (ref 0–50)
ANION GAP SERPL CALCULATED.3IONS-SCNC: 11 MMOL/L (ref 7–15)
AST SERPL W P-5'-P-CCNC: 30 U/L (ref 0–45)
BASOPHILS # BLD AUTO: 0.1 10E3/UL (ref 0–0.2)
BASOPHILS NFR BLD AUTO: 1 %
BILIRUB SERPL-MCNC: 0.3 MG/DL
BUN SERPL-MCNC: 15 MG/DL (ref 8–23)
CALCIUM SERPL-MCNC: 9.4 MG/DL (ref 8.8–10.2)
CHLORIDE SERPL-SCNC: 100 MMOL/L (ref 98–107)
CREAT SERPL-MCNC: 0.92 MG/DL (ref 0.51–0.95)
DEPRECATED HCO3 PLAS-SCNC: 24 MMOL/L (ref 22–29)
EGFRCR SERPLBLD CKD-EPI 2021: 67 ML/MIN/1.73M2
EOSINOPHIL # BLD AUTO: 0 10E3/UL (ref 0–0.7)
EOSINOPHIL NFR BLD AUTO: 1 %
ERYTHROCYTE [DISTWIDTH] IN BLOOD BY AUTOMATED COUNT: 14.9 % (ref 10–15)
GLUCOSE SERPL-MCNC: 101 MG/DL (ref 70–99)
HCT VFR BLD AUTO: 34.3 % (ref 35–47)
HGB BLD-MCNC: 11.8 G/DL (ref 11.7–15.7)
HOLD SPECIMEN: NORMAL
HOLD SPECIMEN: NORMAL
IMM GRANULOCYTES # BLD: 0 10E3/UL
IMM GRANULOCYTES NFR BLD: 0 %
LAB DIRECTOR COMMENTS: NORMAL
LAB DIRECTOR COMMENTS: NORMAL
LAB DIRECTOR DISCLAIMER: NORMAL
LAB DIRECTOR DISCLAIMER: NORMAL
LAB DIRECTOR INTERPRETATION: NORMAL
LAB DIRECTOR INTERPRETATION: NORMAL
LAB DIRECTOR METHODOLOGY: NORMAL
LAB DIRECTOR METHODOLOGY: NORMAL
LAB DIRECTOR RESULTS: NORMAL
LAB DIRECTOR RESULTS: NORMAL
LDH SERPL L TO P-CCNC: 254 U/L (ref 0–250)
LYMPHOCYTES # BLD AUTO: 4.3 10E3/UL (ref 0.8–5.3)
LYMPHOCYTES NFR BLD AUTO: 61 %
MCH RBC QN AUTO: 34.8 PG (ref 26.5–33)
MCHC RBC AUTO-ENTMCNC: 34.4 G/DL (ref 31.5–36.5)
MCV RBC AUTO: 101 FL (ref 78–100)
MONOCYTES # BLD AUTO: 0.5 10E3/UL (ref 0–1.3)
MONOCYTES NFR BLD AUTO: 8 %
NEUTROPHILS # BLD AUTO: 2 10E3/UL (ref 1.6–8.3)
NEUTROPHILS NFR BLD AUTO: 29 %
NRBC # BLD AUTO: 0 10E3/UL
NRBC BLD AUTO-RTO: 0 /100
PLATELET # BLD AUTO: 199 10E3/UL (ref 150–450)
POTASSIUM SERPL-SCNC: 4.2 MMOL/L (ref 3.4–5.3)
PROT SERPL-MCNC: 7.2 G/DL (ref 6.4–8.3)
RBC # BLD AUTO: 3.39 10E6/UL (ref 3.8–5.2)
SODIUM SERPL-SCNC: 135 MMOL/L (ref 136–145)
SPECIMEN DESCRIPTION: NORMAL
SPECIMEN DESCRIPTION: NORMAL
VIT B12 SERPL-MCNC: 593 PG/ML (ref 232–1245)
WBC # BLD AUTO: 6.9 10E3/UL (ref 4–11)

## 2023-09-22 PROCEDURE — 80053 COMPREHEN METABOLIC PANEL: CPT | Mod: ZL

## 2023-09-22 PROCEDURE — 81207 BCR/ABL1 GENE MINOR BP: CPT | Mod: ZL

## 2023-09-22 PROCEDURE — 81206 BCR/ABL1 GENE MAJOR BP: CPT | Mod: ZL

## 2023-09-22 PROCEDURE — 36415 COLL VENOUS BLD VENIPUNCTURE: CPT | Mod: ZL

## 2023-09-22 PROCEDURE — 82607 VITAMIN B-12: CPT | Mod: ZL

## 2023-09-22 PROCEDURE — 83615 LACTATE (LD) (LDH) ENZYME: CPT | Mod: ZL

## 2023-09-22 PROCEDURE — 85025 COMPLETE CBC W/AUTO DIFF WBC: CPT | Mod: ZL

## 2023-09-28 ENCOUNTER — ONCOLOGY VISIT (OUTPATIENT)
Dept: ONCOLOGY | Facility: OTHER | Age: 68
End: 2023-09-28
Attending: INTERNAL MEDICINE
Payer: MEDICARE

## 2023-09-28 VITALS
WEIGHT: 168 LBS | DIASTOLIC BLOOD PRESSURE: 76 MMHG | OXYGEN SATURATION: 99 % | TEMPERATURE: 97.3 F | BODY MASS INDEX: 27.96 KG/M2 | RESPIRATION RATE: 12 BRPM | SYSTOLIC BLOOD PRESSURE: 138 MMHG | HEART RATE: 98 BPM

## 2023-09-28 DIAGNOSIS — R79.89 LOW VITAMIN B12 LEVEL: ICD-10-CM

## 2023-09-28 DIAGNOSIS — C92.11 CHRONIC MYELOID LEUKEMIA IN REMISSION (H): Primary | ICD-10-CM

## 2023-09-28 PROCEDURE — G0463 HOSPITAL OUTPT CLINIC VISIT: HCPCS

## 2023-09-28 PROCEDURE — 99215 OFFICE O/P EST HI 40 MIN: CPT | Performed by: INTERNAL MEDICINE

## 2023-09-28 PROCEDURE — 99417 PROLNG OP E/M EACH 15 MIN: CPT | Performed by: INTERNAL MEDICINE

## 2023-09-28 RX ORDER — MULTIVIT WITH MINERALS/LUTEIN
1 TABLET ORAL DAILY
COMMUNITY
Start: 2023-07-28

## 2023-09-28 ASSESSMENT — PAIN SCALES - GENERAL: PAINLEVEL: NO PAIN (0)

## 2023-09-28 NOTE — PROGRESS NOTES
[facility] Hematology and Oncology Progress Note    Patient: Alison Norwood  MRN: [id number]  Date of Service: Sep 28, 2023         Reason for Visit    Chief Complaint   Patient presents with    Hematology     Chronic Myelogenous Leukemia       ECOG Performance    0 - Independent      Encounter Diagnoses: Chronic myelogenous leukemia.    Problem List Items Addressed This Visit          Oncology Diagnoses    Chronic myeloid leukemia in remission (H) - Primary    Relevant Orders    CBC with Platelets & Differential    Comprehensive metabolic panel    Lactate Dehydrogenase    BCR ABL1 Major Breakpoint Quant p210    BCR ABL1 Minor Breakpoint Quant p190    Vitamin B12     Other Visit Diagnoses       Low vitamin B12 level        Relevant Orders    CBC with Platelets & Differential    Comprehensive metabolic panel    Lactate Dehydrogenase    BCR ABL1 Major Breakpoint Quant p210    BCR ABL1 Minor Breakpoint Quant p190    Vitamin B12             History of Present Illness    MsKurt Norwood returns for follow-up of chronic myelogenous leukemia in remission.We had originally seen the patient in consultation at the request of Dr. Esquivel back on 11/11/2020. At that time, she was a 65-year-old white female who had presented while living in [place] in 2003 with hyperleukocytosis and splenomegaly and was diagnosed with CML with BCR/ABL translocation of p210 gene. She was treated initially with Hydrea by Dr. Asad Escobar at [place] [facility] and then placed on Gleevec at a dose of 400 mg p.o. daily. She was followed by a nurse practitioner in Hematology/Oncology at [place], and moved to [place] and wanted to establish care with us. She said she had been in hematologic cytogenetic and molecular remission over the past 15 years. She was tolerating imatinib well. She had some problems with diarrhea. She was switched over to generic Gleevec and her symptoms seemed to improve, but she did get occasional  bouts of nausea and diarrhea, but this did improve over time. She continued to be in molecular cytogenetic response. She was subsequently seen by Danae Vazquez, nurse practitioner, on 07/14/2022 and there was concern about a white count being low at 3.1. In July, her ANC was 1.5, hemoglobin 11.4. The patient was concerned about her mild anemia. When we saw the patient last on 11/10/2022, the plan was to obtain a peripheral blood smear and obtain iron studies, B12, folate, sed rate, rheumatoid factor, LYUBOV. Peripheral blood smear was unremarkable. No evidence of blasts. Her ferritin came back normal at 68, but her B12 level came back low at 175. She was placed on oral B12.  She returns for follow-up today and is doing relatively well, she feels the B12 is helped her energy level.  She does get occasional fatigue.  She has been in hematologic and cyto-genetic remission.  She denies any fevers, night sweats, weight loss, early satiety, or diarrhea.      ______________________________________________________________________________    Past History    Past Medical History:   Diagnosis Date    CML (chronic myelocytic leukemia) (H) 2003       Past Surgical History:   Procedure Laterality Date    AS BLEPHAROPLASTY UPPER LID W EXCESS SKIN Bilateral     BUNIONECTOMY Left 2019    COLONOSCOPY  2011    CYSTECTOMY OVARIAN BENIGN      IR PORT CHECK RIGHT  8/7/2020    OPEN REDUCTION INTERNAL FIXATION FOOT Right 01/2020    Paul's fracture           Review of systems.  CNS: There are no headaches, no blurred vision, no change in mental status,   ENT: There is no hearing loss.  Respiratory: There is no shortness of breath, hemoptysis, cough  Cardiac: There is no chest pain, orthopnea, PND, or ankle edema.  GI: There is no bright red blood per rectum, no hematemesis, no reflux, no diarrhea or constipation  Musculoskeletal: There is no joint pain, or or myalgias.  : There is no urinary frequency, hematuria.  Constitutional: There is  no fevers, night sweats, weight loss.  Endocrine: There are are no hot flashes, fatigue, or galactorrhea.  Neuro: There is no tingling or numbness in the hands or feet.  Hematologic: There is no gingival bleeding, epistaxis, or easy bruisability.  Dermatologic: There is no skin rash.  A 14 point review of systems is otherwise negative.          Physical Exam    /76 (BP Location: Right arm, Patient Position: Sitting, Cuff Size: Adult Regular)   Pulse 98   Temp 97.3  F (36.3  C) (Tympanic)   Resp 12   Wt 76.2 kg (168 lb)   SpO2 99%   BMI 27.96 kg/m        GENERAL: Alert and oriented to time place and person. Seated comfortably. In no distress.    HEAD: Atraumatic and normocephalic.    EYES: FADIA, EOMI.  No pallor.  No icterus.    Oral cavity: no mucosal lesion or tonsillar enlargement.    NECK: supple. JVP normal.  No thyroid enlargement.    LYMPH NODES: There are no palpable cervical, supraclavicular, axillary, or inguinal nodes.    CHEST: clear to auscultation bilaterally.  Resonant to percussion throughout bilaterally.  Symmetrical breath movements bilaterally.    CVS: S1 and S2 are heard. Regular rate and rhythm.  No murmur or gallop or rub heard.  No peripheral edema.    ABDOMEN: Soft. Not tender. Not distended.  No palpable hepatomegaly or splenomegaly.  No other mass palpable.  Bowel sounds heard.    EXTREMITIES: There is no ankle edema.    SKIN: no rash, or bruising or purpura.    NEURO: Grossly non-focal.    Lab Results    Recent Results (from the past 168 hour(s))   Comprehensive metabolic panel   Result Value Ref Range    Sodium 135 (L) 136 - 145 mmol/L    Potassium 4.2 3.4 - 5.3 mmol/L    Chloride 100 98 - 107 mmol/L    Carbon Dioxide (CO2) 24 22 - 29 mmol/L    Anion Gap 11 7 - 15 mmol/L    Urea Nitrogen 15.0 8.0 - 23.0 mg/dL    Creatinine 0.92 0.51 - 0.95 mg/dL    Calcium 9.4 8.8 - 10.2 mg/dL    Glucose 101 (H) 70 - 99 mg/dL    Alkaline Phosphatase 86 35 - 104 U/L    AST 30 0 - 45 U/L    ALT  22 0 - 50 U/L    Protein Total 7.2 6.4 - 8.3 g/dL    Albumin 4.3 3.5 - 5.2 g/dL    Bilirubin Total 0.3 <=1.2 mg/dL    GFR Estimate 67 >60 mL/min/1.73m2   Lactate Dehydrogenase   Result Value Ref Range    Lactate Dehydrogenase 254 (H) 0 - 250 U/L   Vitamin B12   Result Value Ref Range    Vitamin B12 593 232 - 1,245 pg/mL   BCR ABL1 Major Breakpoint Quant p210   Result Value Ref Range    RESULTS       BCR::ABL1 MAJOR(p210) QUANTITATION RESULTS:  BCR::ABL1/ABL1 Major(p210):  SEE COMMENT    INTERNAL CONTROL (NUMBER OF ABL1 TRANSCRIPTS): 15,400      INTERPRETATION       INTERPRETATION:  Molecular testing performed on submitted Blood.    This sample is positive for BCR::ABL1 transcripts of the major (p210) breakpoint cluster regions with a BCR::ABL1/ABL1 ratio of <0.065%.  The limit of sensitivity of the quantitative BCR::ABL1 major (p210) assay is 10 copies of the BCR::ABL1 transcripts. This sample contains less than 10 copies of BCR::ABL1 transcripts, therefore; accurate quantitation is not possible.  This patient has had low level to negative results on the standard BCR::ABL1 testing and may be considered for high sensitivity BCR::ABL1 testing which is a different order.   (Electronically signed by: Deepali La MD September 25, 2023 5:08 PM)      COMMENTS       The limit of sensitivity of the quantitative BCR::ABL1 major(p210) assay is 10 copies of the BCR::ABL1 transcripts.   Individual measurements of BCR::ABL1 transcript levels yield limited prognostic information; serial monitoring of BCR::ABL1 transcript levels (at 3 or 6 month intervals) allows for clinically relevant assessment of response to therapy at the molecular level. A major molecular response has been defined as a 3-log decrease in BCR::ABL1/ABL1 from a baseline value. The patient's own BCR::ABL1/ABL1 ratio performed by the same laboratory serves as the definitive baseline from which a three log decrease is measured. However, in practice this value  is not always available. Therefore the concept of the laboratory-specific mean pre-treatment baseline value has been established. In our laboratory, the mean pre-treatment BCR::ABL1/ABL1 in CML patients is 122% (range: 68% to 217%). Therefore, a three log  reduction from the mean pre-treatment baseline in our laboratory is a BCR::ABL1/ABL1 value of 0.1%.    References:  N Engl J Med 2003; 17:2318, Int J Oncol 2006;28:1099, [id number];108:28    Please note, comparisons of bone marrow and blood samples may not be clinically appropriate.      METHODOLOGY       Total cellular RNA was extracted from above specimen and reverse transcribed to cDNA via random hexamer priming. The cDNA was amplified by a real time quantitative PCR assay (using an [employer] 3 Real-time analyzer) with an ABL1 specific primer and a primer specific for exon 13 of the BCR gene. An internal control (ABL1 gene), was amplified to confirm the presence of patient RNA. The presence of at least 1000 copies of the ABL1 gene was considered a cutoff value for determining an adequate specimen for quantitative analysis of the BCR::ABL1 translocation. The results are reported as a ratio of the number of BCR::ABL1 copies to the number of ABL1 copies to normalize for differences in amount of RNA in the reaction and enable serial monitoring of the BCR::ABL1 transcript numbers.  This test has been calibrated to the international scale(IS) using a validated reference standard.      DISCLAIMER       This test was developed and its performance characteristics determined by [facility]. It has not been cleared or approved by the FDA. The laboratory is regulated under CLIA as qualified to perform high-complexity testing. This test is used for clinical purposes. It should not be regarded as investigational or for research.    A resident/fellow in an accredited training program was involved in the selection of testing, review  of laboratory data, and/or interpretation of this case.  I, as the senior physician, attest that I: (i) confirmed appropriate testing, (ii) examined the relevant raw data for the specimen(s); and (iii) rendered or confirmed the interpretation(s).        Specimen Description       Blood     CBC with platelets and differential   Result Value Ref Range    WBC Count 6.9 4.0 - 11.0 10e3/uL    RBC Count 3.39 (L) 3.80 - 5.20 10e6/uL    Hemoglobin 11.8 11.7 - 15.7 g/dL    Hematocrit 34.3 (L) 35.0 - 47.0 %     (H) 78 - 100 fL    MCH 34.8 (H) 26.5 - 33.0 pg    MCHC 34.4 31.5 - 36.5 g/dL    RDW 14.9 10.0 - 15.0 %    Platelet Count 199 150 - 450 10e3/uL    % Neutrophils 29 %    % Lymphocytes 61 %    % Monocytes 8 %    % Eosinophils 1 %    % Basophils 1 %    % Immature Granulocytes 0 %    NRBCs per 100 WBC 0 <1 /100    Absolute Neutrophils 2.0 1.6 - 8.3 10e3/uL    Absolute Lymphocytes 4.3 0.8 - 5.3 10e3/uL    Absolute Monocytes 0.5 0.0 - 1.3 10e3/uL    Absolute Eosinophils 0.0 0.0 - 0.7 10e3/uL    Absolute Basophils 0.1 0.0 - 0.2 10e3/uL    Absolute Immature Granulocytes 0.0 <=0.4 10e3/uL    Absolute NRBCs 0.0 10e3/uL   Extra Serum Separator Tube (SST)   Result Value Ref Range    Hold Specimen JIC    Extra Green Top (Lithium Heparin) Tube   Result Value Ref Range    Hold Specimen JIC        Imaging    No results found.    Assessment and Plan: Chronic myelogenous leukemia: Diagnosed in 2003 as CML chronic phase.  Patient is currently in molecular, hematologic, and cytogenetic remission on imatinib 400 mg p.o. daily plan is to continue imatinib 400 mg p.o. daily, and follow-up in 3 months with repeat CBC, CMP, LDH, and BCR/ABL P2 10/P1 90 transcript.  #2: B12 deficiency: Improved with oral B12, she will continue with oral B12 supplementation.  Time spent: 76 minutes was spent on this total patient visit: We spent time reviewing labs with the patient, performing a history and physical, documenting history physical, and  ordering follow-up labs and appointments.    Cancer Staging   No matching staging information was found for the patient.      Signed by: Robin Rosales MD    CC: Physician No Ref-Primary

## 2023-09-28 NOTE — NURSING NOTE
Chief Complaint   Patient presents with    Hematology     Chronic Myelogenous Leukemia     Medication Reconciliation: complete    Amie Recinos CMA ([facility]) 9/28/2023 9:34 AM

## 2023-11-27 DIAGNOSIS — C92.11 CHRONIC MYELOID LEUKEMIA IN REMISSION (H): Primary | ICD-10-CM

## 2023-11-27 RX ORDER — IMATINIB MESYLATE 400 MG/1
400 TABLET, FILM COATED ORAL DAILY
Qty: 30 TABLET | Refills: 0 | Status: SHIPPED | OUTPATIENT
Start: 2023-11-27 | End: 2024-03-14

## 2023-12-06 ENCOUNTER — TELEPHONE (OUTPATIENT)
Dept: ONCOLOGY | Facility: OTHER | Age: 68
End: 2023-12-06
Payer: MEDICARE

## 2023-12-06 NOTE — TELEPHONE ENCOUNTER
Prior Authorization Not Needed per Insurance    Medication: IMATINIB MESYLATE 400 MG PO TABS  Insurance Company: [employer] [place] - Phone [phone] Fax [phone]  Expected CoPay: $ 662.07  Pharmacy Filling the Rx: [facility] [place] [address]  Pharmacy Notified: yes  Patient Notified: yes and asked for this to be sent to [facility]     Юлия will call me when her account is all set up with [facility]

## 2023-12-19 NOTE — TELEPHONE ENCOUNTER
FREE DRUG APPLICATION INITIATED    Medication: IMATINIB MESYLATE 400 MG PO TABS  Free Drug Program Name:  [facility]  Date Submitted: 12/19/2023  3:22 PM  Phone #: [phone]  Fax #: [phone]  Additional Information: sent over for Dr. Rosales to sign

## 2023-12-28 ENCOUNTER — LAB (OUTPATIENT)
Dept: LAB | Facility: OTHER | Age: 68
End: 2023-12-28
Attending: INTERNAL MEDICINE
Payer: MEDICARE

## 2023-12-28 DIAGNOSIS — C92.11 CHRONIC MYELOID LEUKEMIA IN REMISSION (H): ICD-10-CM

## 2023-12-28 DIAGNOSIS — R79.89 LOW VITAMIN B12 LEVEL: ICD-10-CM

## 2023-12-28 LAB
ALBUMIN SERPL BCG-MCNC: 4.6 G/DL (ref 3.5–5.2)
ALP SERPL-CCNC: 86 U/L (ref 40–150)
ALT SERPL W P-5'-P-CCNC: 21 U/L (ref 0–50)
ANION GAP SERPL CALCULATED.3IONS-SCNC: 10 MMOL/L (ref 7–15)
AST SERPL W P-5'-P-CCNC: 31 U/L (ref 0–45)
BASOPHILS # BLD AUTO: 0.1 10E3/UL (ref 0–0.2)
BASOPHILS NFR BLD AUTO: 1 %
BILIRUB SERPL-MCNC: 0.4 MG/DL
BUN SERPL-MCNC: 15.9 MG/DL (ref 8–23)
CALCIUM SERPL-MCNC: 9.4 MG/DL (ref 8.8–10.2)
CHLORIDE SERPL-SCNC: 99 MMOL/L (ref 98–107)
CREAT SERPL-MCNC: 0.99 MG/DL (ref 0.51–0.95)
DEPRECATED HCO3 PLAS-SCNC: 26 MMOL/L (ref 22–29)
EGFRCR SERPLBLD CKD-EPI 2021: 61 ML/MIN/1.73M2
EOSINOPHIL # BLD AUTO: 0.1 10E3/UL (ref 0–0.7)
EOSINOPHIL NFR BLD AUTO: 1 %
ERYTHROCYTE [DISTWIDTH] IN BLOOD BY AUTOMATED COUNT: 13.9 % (ref 10–15)
GLUCOSE SERPL-MCNC: 114 MG/DL (ref 70–99)
HCT VFR BLD AUTO: 32.5 % (ref 35–47)
HGB BLD-MCNC: 11.5 G/DL (ref 11.7–15.7)
IMM GRANULOCYTES # BLD: 0 10E3/UL
IMM GRANULOCYTES NFR BLD: 0 %
LAB DIRECTOR COMMENTS: NORMAL
LAB DIRECTOR COMMENTS: NORMAL
LAB DIRECTOR DISCLAIMER: NORMAL
LAB DIRECTOR DISCLAIMER: NORMAL
LAB DIRECTOR INTERPRETATION: NORMAL
LAB DIRECTOR INTERPRETATION: NORMAL
LAB DIRECTOR METHODOLOGY: NORMAL
LAB DIRECTOR METHODOLOGY: NORMAL
LAB DIRECTOR RESULTS: NORMAL
LAB DIRECTOR RESULTS: NORMAL
LDH SERPL L TO P-CCNC: 226 U/L (ref 0–250)
LYMPHOCYTES # BLD AUTO: 3 10E3/UL (ref 0.8–5.3)
LYMPHOCYTES NFR BLD AUTO: 53 %
MCH RBC QN AUTO: 35.2 PG (ref 26.5–33)
MCHC RBC AUTO-ENTMCNC: 35.4 G/DL (ref 31.5–36.5)
MCV RBC AUTO: 99 FL (ref 78–100)
MONOCYTES # BLD AUTO: 0.5 10E3/UL (ref 0–1.3)
MONOCYTES NFR BLD AUTO: 9 %
NEUTROPHILS # BLD AUTO: 2 10E3/UL (ref 1.6–8.3)
NEUTROPHILS NFR BLD AUTO: 36 %
NRBC # BLD AUTO: 0 10E3/UL
NRBC BLD AUTO-RTO: 0 /100
PLATELET # BLD AUTO: 234 10E3/UL (ref 150–450)
POTASSIUM SERPL-SCNC: 4.8 MMOL/L (ref 3.4–5.3)
PROT SERPL-MCNC: 6.8 G/DL (ref 6.4–8.3)
RBC # BLD AUTO: 3.27 10E6/UL (ref 3.8–5.2)
SODIUM SERPL-SCNC: 135 MMOL/L (ref 135–145)
SPECIMEN DESCRIPTION: NORMAL
SPECIMEN DESCRIPTION: NORMAL
VIT B12 SERPL-MCNC: 843 PG/ML (ref 232–1245)
WBC # BLD AUTO: 5.7 10E3/UL (ref 4–11)

## 2023-12-28 PROCEDURE — 83615 LACTATE (LD) (LDH) ENZYME: CPT | Mod: ZL

## 2023-12-28 PROCEDURE — 36415 COLL VENOUS BLD VENIPUNCTURE: CPT | Mod: ZL

## 2023-12-28 PROCEDURE — 85004 AUTOMATED DIFF WBC COUNT: CPT | Mod: ZL

## 2023-12-28 PROCEDURE — G0452 MOLECULAR PATHOLOGY INTERPR: HCPCS | Mod: 26 | Performed by: PATHOLOGY

## 2023-12-28 PROCEDURE — 81207 BCR/ABL1 GENE MINOR BP: CPT | Mod: ZL

## 2023-12-28 PROCEDURE — 80053 COMPREHEN METABOLIC PANEL: CPT | Mod: ZL

## 2023-12-28 PROCEDURE — 81206 BCR/ABL1 GENE MAJOR BP: CPT | Mod: ZL

## 2023-12-28 PROCEDURE — 82607 VITAMIN B-12: CPT | Mod: ZL

## 2023-12-29 NOTE — TELEPHONE ENCOUNTER
Spoke to Molly, at [employer]. Application is in, income is they can't read yet, Benefit is not starting till 1/2/24 and it will take up to Feb. 1 2024  Ph: [phone]  Alison will re-scan page 1 and 2 and I will fax back to [phone] and check early next week to make sure they got it again

## 2024-01-04 DIAGNOSIS — C92.11 CHRONIC MYELOID LEUKEMIA IN REMISSION (H): Primary | ICD-10-CM

## 2024-01-04 RX ORDER — IMATINIB MESYLATE 400 MG/1
400 TABLET, FILM COATED ORAL DAILY
Qty: 90 TABLET | Refills: 0 | Status: SHIPPED | OUTPATIENT
Start: 2024-01-04 | End: 2024-03-14

## 2024-01-04 NOTE — TELEPHONE ENCOUNTER
Spoke to Kandi at [facility] [employer] [facility] . Application is in, income is not in yet, Benefit is still pending and it will take up to Feb. 1st to finish up benefits  Ph: [phone]

## 2024-01-09 NOTE — TELEPHONE ENCOUNTER
Spoke to Chito, at [facility]. Application is in, income in, Benefit is pending and it will take till end of week   Ph: [phone]

## 2024-01-11 ENCOUNTER — ONCOLOGY VISIT (OUTPATIENT)
Dept: ONCOLOGY | Facility: OTHER | Age: 69
End: 2024-01-11
Attending: NURSE PRACTITIONER
Payer: MEDICARE

## 2024-01-11 VITALS
TEMPERATURE: 98.1 F | RESPIRATION RATE: 12 BRPM | BODY MASS INDEX: 29.45 KG/M2 | HEART RATE: 85 BPM | DIASTOLIC BLOOD PRESSURE: 78 MMHG | SYSTOLIC BLOOD PRESSURE: 132 MMHG | OXYGEN SATURATION: 99 % | WEIGHT: 177 LBS

## 2024-01-11 DIAGNOSIS — C92.11 CHRONIC MYELOID LEUKEMIA IN REMISSION (H): Primary | ICD-10-CM

## 2024-01-11 PROCEDURE — G0463 HOSPITAL OUTPT CLINIC VISIT: HCPCS | Performed by: NURSE PRACTITIONER

## 2024-01-11 PROCEDURE — 99214 OFFICE O/P EST MOD 30 MIN: CPT | Performed by: NURSE PRACTITIONER

## 2024-01-11 ASSESSMENT — PAIN SCALES - GENERAL: PAINLEVEL: NO PAIN (0)

## 2024-01-11 NOTE — PROGRESS NOTES
Oncology Follow-up Visit:  January 11, 2024  Diagnosis:CML    History Of Present Illness:  Patient here for follow up of her CML. Patient was initially seen on 11/11/20 to evaluate concerning her diagnosis of chronic myelogenous leukemia.  Patient was initially diagnosed in 2003. Patient was living in [place] and presented with hyperleukocytosis splenomegaly and was diagnosed with CML with BCR-ABL translocation p210 gene being translocated.  She was treated initially with Hydrea by Dr. Asad Escobar at [facility] and then placed on Gleevec or imatinib 400 mg p.o. daily.  She had been followed since then by the nurse practitioner in Hematology/Oncology in [facility].  Patient moved to the [facility] and established care with us. She has tolerated imatinib well. Initially, she had problems with diarrhea. She was switched to generic Gleevec and then symptoms seemed to improve. She continued to be in molecular cytogenetic response. Patient was seen on 07/14/2022 and there was concern about a white count being low at 3.1. In July 2022, her ANC was 1.5, hemoglobin 11.4. The patient was concerned about her mild anemia.  When patient was seen on 11/10/2022, the plan was to obtain a peripheral blood smear and obtain iron studies, B12, folate, sed rate, rheumatoid factor, LYUBOV.  Peripheral blood smear was unremarkable.  No evidence of blasts.  Her ferritin came back normal at 68, but her B12 level came back low at 175. She was placed on oral B12 and continues on this. Labs done on 12/28/23 show a wbc of 5.7, hemoglobin is 11.5. BCR ABL major is undetectable, BCR ABL minor is positive with a ratio of <0.031%. All other labs are stable. Patient states she has been feeling well. She has no other new concerns.        Review Of Systems:  Review Of Systems  Eyes/Ears/Nose/Throat: denies new vision changes  Respiratory: No shortness of breath, dyspnea on exertion, cough  Cardiovascular:  denies chest pain  Gastrointestinal: denies abdominal pain, no bowel changes  Genitourinary: denies dysuria or hematuria  Musculoskeletal: denies new bone pain  Neurologic: denies headaches, no dizziness  Hematologic/Lymphatic/Immunologic: denies fevers,no recent illness      Nursing Notes:   Amie Recinos CMA  1/11/2024 10:31 AM  Sign at exiting of workspace  Chief Complaint   Patient presents with    Hematology     CML     Medication Reconciliation: complete    Amie Recinos CMA ([facility]) 1/11/2024 10:31 AM      Past medical, social, surgical, and family histories reviewed.    Allergies:  Allergies as of 01/11/2024    (No Known Allergies)       Current Medications:  Current Outpatient Medications   Medication Sig Dispense Refill    cyanocobalamin (VITAMIN B-12) 1000 MCG tablet Take 1,000 mcg by mouth daily      imatinib (GLEEVEC) 400 MG tablet Take 1 tablet (400 mg) by mouth daily Take with a meal and a large glass of water. 90 tablet 0    multivitamin (CENTRUM SILVER) tablet Take 1 tablet by mouth daily      Probiotic Product (ACIDOPHILUS PROBIOTIC BLEND) CAPS Take 1 capsule by mouth daily      Calcium-Magnesium-Zinc 333-133-5 MG TABS per tablet Take 1 tablet by mouth daily      cholecalciferol 50 MCG (2000 UT) tablet Take 2,000 Units by mouth daily      famotidine (PEPCID) 40 MG tablet Take 40 mg by mouth daily as needed      imatinib (GLEEVEC) 400 MG tablet Take 1 tablet (400 mg) by mouth daily Take with a meal and a large glass of water. 30 tablet 0    imatinib (GLEEVEC) 400 MG tablet Take 1 tablet (400 mg) by mouth daily Take with a meal and a large glass of water. 30 tablet 11    imatinib (GLEEVEC) 400 MG tablet Take 1 tablet (400 mg) by mouth daily Take with a meal and a large glass of water. 30 tablet 11    imatinib (GLEEVEC) 400 MG tablet Take 1 tablet (400 mg) by mouth daily Take with a meal and a large glass of water. 30 tablet 4    imatinib (GLEEVEC) 400 MG TABS tablet Take 1 tablet (400 mg)  by mouth daily 30 tablet 6    valACYclovir (VALTREX) 1000 mg tablet TAKE 2 TABLETS BY MOUTH AT BREAKOUT, THEN TAKE 2 MORE TABLETS 12 HOURS LATER          Physical Exam:  /78 (BP Location: Right arm, Patient Position: Sitting, Cuff Size: Adult Regular)   Pulse 85   Temp 98.1  F (36.7  C) (Tympanic)   Resp 12   Wt 80.3 kg (177 lb)   SpO2 99%   BMI 29.45 kg/m      GENERAL APPEARANCE: 62 year old female, alert and no distress     NECK: no adenopathy, no asymmetry or masses     LYMPHATICS: No cervical, supraclavicular, axillary lymphadenopathy     RESP: lungs clear to auscultation - no rales, rhonchi or wheezes     CARDIOVASCULAR: regular rates and rhythm, normal S1 S2     ABDOMEN:  soft, nontender, bowel sounds normal     MUSCULOSKELETAL: extremities normal- no gross deformities noted, No edema b/l LE.     SKIN: no suspicious lesions or rashes on exposed skin     PSYCHIATRIC: mentation appears normal and affect normal    Laboratory/Imaging Studies  No visits with results within 2 Week(s) from this visit.   Latest known visit with results is:   Lab on 12/28/2023   Component Date Value Ref Range Status    Sodium 12/28/2023 135  135 - 145 mmol/L Final    Reference intervals for this test were updated on 09/26/2023 to more accurately reflect our healthy population. There may be differences in the flagging of prior results with similar values performed with this method. Interpretation of those prior results can be made in the context of the updated reference intervals.     Potassium 12/28/2023 4.8  3.4 - 5.3 mmol/L Final    Carbon Dioxide (CO2) 12/28/2023 26  22 - 29 mmol/L Final    Anion Gap 12/28/2023 10  7 - 15 mmol/L Final    Urea Nitrogen 12/28/2023 15.9  8.0 - 23.0 mg/dL Final    Creatinine 12/28/2023 0.99 (H)  0.51 - 0.95 mg/dL Final    GFR Estimate 12/28/2023 61  >60 mL/min/1.73m2 Final    Calcium 12/28/2023 9.4  8.8 - 10.2 mg/dL Final    Chloride 12/28/2023 99  98 - 107 mmol/L Final    Glucose 12/28/2023  114 (H)  70 - 99 mg/dL Final    Alkaline Phosphatase 12/28/2023 86  40 - 150 U/L Final    Reference intervals for this test were updated on 11/14/2023 to more accurately reflect our healthy population. There may be differences in the flagging of prior results with similar values performed with this method. Interpretation of those prior results can be made in the context of the updated reference intervals.    AST 12/28/2023 31  0 - 45 U/L Final    Reference intervals for this test were updated on 6/12/2023 to more accurately reflect our healthy population. There may be differences in the flagging of prior results with similar values performed with this method. Interpretation of those prior results can be made in the context of the updated reference intervals.    ALT 12/28/2023 21  0 - 50 U/L Final    Reference intervals for this test were updated on 6/12/2023 to more accurately reflect our healthy population. There may be differences in the flagging of prior results with similar values performed with this method. Interpretation of those prior results can be made in the context of the updated reference intervals.      Protein Total 12/28/2023 6.8  6.4 - 8.3 g/dL Final    Albumin 12/28/2023 4.6  3.5 - 5.2 g/dL Final    Bilirubin Total 12/28/2023 0.4  <=1.2 mg/dL Final    Lactate Dehydrogenase 12/28/2023 226  0 - 250 U/L Final    RESULTS 12/28/2023    Final                    Value:This result contains rich text formatting which cannot be displayed here.    INTERPRETATION 12/28/2023    Final                    Value:This result contains rich text formatting which cannot be displayed here.    COMMENTS 12/28/2023    Final                    Value:This result contains rich text formatting which cannot be displayed here.    METHODOLOGY 12/28/2023    Final                    Value:This result contains rich text formatting which cannot be displayed here.    DISCLAIMER 12/28/2023    Final                    Value:This result  contains rich text formatting which cannot be displayed here.    Specimen Description 12/28/2023    Final                    Value:This result contains rich text formatting which cannot be displayed here.    RESULTS 12/28/2023    Final                    Value:This result contains rich text formatting which cannot be displayed here.    INTERPRETATION 12/28/2023    Final                    Value:This result contains rich text formatting which cannot be displayed here.    COMMENTS 12/28/2023    Final                    Value:This result contains rich text formatting which cannot be displayed here.    METHODOLOGY 12/28/2023    Final                    Value:This result contains rich text formatting which cannot be displayed here.    DISCLAIMER 12/28/2023    Final                    Value:This result contains rich text formatting which cannot be displayed here.    Specimen Description 12/28/2023    Final                    Value:This result contains rich text formatting which cannot be displayed here.    Vitamin B12 12/28/2023 843  232 - 1,245 pg/mL Final    WBC Count 12/28/2023 5.7  4.0 - 11.0 10e3/uL Final    RBC Count 12/28/2023 3.27 (L)  3.80 - 5.20 10e6/uL Final    Hemoglobin 12/28/2023 11.5 (L)  11.7 - 15.7 g/dL Final    Hematocrit 12/28/2023 32.5 (L)  35.0 - 47.0 % Final    MCV 12/28/2023 99  78 - 100 fL Final    MCH 12/28/2023 35.2 (H)  26.5 - 33.0 pg Final    MCHC 12/28/2023 35.4  31.5 - 36.5 g/dL Final    RDW 12/28/2023 13.9  10.0 - 15.0 % Final    Platelet Count 12/28/2023 234  150 - 450 10e3/uL Final    % Neutrophils 12/28/2023 36  % Final    % Lymphocytes 12/28/2023 53  % Final    % Monocytes 12/28/2023 9  % Final    % Eosinophils 12/28/2023 1  % Final    % Basophils 12/28/2023 1  % Final    % Immature Granulocytes 12/28/2023 0  % Final    NRBCs per 100 WBC 12/28/2023 0  <1 /100 Final    Absolute Neutrophils 12/28/2023 2.0  1.6 - 8.3 10e3/uL Final    Absolute Lymphocytes 12/28/2023 3.0  0.8 - 5.3 10e3/uL  Final    Absolute Monocytes 12/28/2023 0.5  0.0 - 1.3 10e3/uL Final    Absolute Eosinophils 12/28/2023 0.1  0.0 - 0.7 10e3/uL Final    Absolute Basophils 12/28/2023 0.1  0.0 - 0.2 10e3/uL Final    Absolute Immature Granulocytes 12/28/2023 0.0  <=0.4 10e3/uL Final    Absolute NRBCs 12/28/2023 0.0  10e3/uL Final        ASSESSMENT/PLAN:  Chronic myelogenous leukemia diagnosed in 2003, chronic phase.  The patient is currently in molecular hematologic cytogenetic remission on imatinib 400 mg daily.  She was found to have a mild anemia with underlying B12 deficiency of unknown etiology. She will continue oral B12 at 1000 mcg p.o. daily. Labs done on 12/28/23 show a wbc of 5.7, hemoglobin is 11.5. BCR ABL major is undetectable, BCR ABL minor is positive with a ratio of <0.031%. All other labs are stable. LAbs were reviewed with Dr Rosales. We will see the patient in 2 months with repeat CBC, CMP, LDH, B12 level and BCR/ABL transcripts per Dr Rosales.     Thirty two minutes spent with this encounter with time spent reviewing patient records, counseling patient regarding disease process, interpretation and review of labs with patient, discussing recent URI symptoms, discussing plan for follow up, obtaining a review of systems, performing a physical exam, ordering tests, documenting in EHR and coordination of care

## 2024-01-11 NOTE — NURSING NOTE
Chief Complaint   Patient presents with    Hematology     CML     Medication Reconciliation: complete    Amie Recinos CMA ([facility]) 1/11/2024 10:31 AM

## 2024-01-15 DIAGNOSIS — C92.11 CHRONIC MYELOID LEUKEMIA IN REMISSION (H): ICD-10-CM

## 2024-01-15 RX ORDER — IMATINIB MESYLATE 400 MG/1
400 TABLET, FILM COATED ORAL DAILY
Qty: 30 TABLET | Refills: 4 | Status: CANCELLED | OUTPATIENT
Start: 2024-01-15

## 2024-01-16 NOTE — TELEPHONE ENCOUNTER
[facility] will release medication to approved pharmacy.   Debora Freeman RN...........1/16/2024 9:03 AM

## 2024-01-17 NOTE — TELEPHONE ENCOUNTER
FREE DRUG APPLICATION APPROVED    Medication: IMATINIB MESYLATE 400 MG PO TABS  Program Name:  [employer]  Effective Date: 1/16/2024  Expiration Date: 12/31/2024  Pharmacy Filling the Rx: [email or web address] [place], [place] - [address]  Patient Notified: Will notify after 9am  Additional Information: see below

## 2024-01-29 DIAGNOSIS — C92.11 CHRONIC MYELOID LEUKEMIA IN REMISSION (H): Primary | ICD-10-CM

## 2024-01-29 RX ORDER — IMATINIB MESYLATE 400 MG/1
400 TABLET, FILM COATED ORAL DAILY
Qty: 30 TABLET | Refills: 11 | Status: SHIPPED | OUTPATIENT
Start: 2024-01-29

## 2024-03-06 ENCOUNTER — LAB (OUTPATIENT)
Dept: LAB | Facility: OTHER | Age: 69
End: 2024-03-06
Payer: MEDICARE

## 2024-03-06 DIAGNOSIS — C92.11 CHRONIC MYELOID LEUKEMIA IN REMISSION (H): ICD-10-CM

## 2024-03-06 LAB
ALBUMIN SERPL BCG-MCNC: 4.4 G/DL (ref 3.5–5.2)
ALP SERPL-CCNC: 85 U/L (ref 40–150)
ALT SERPL W P-5'-P-CCNC: 23 U/L (ref 0–50)
ANION GAP SERPL CALCULATED.3IONS-SCNC: 10 MMOL/L (ref 7–15)
AST SERPL W P-5'-P-CCNC: 28 U/L (ref 0–45)
BASOPHILS # BLD AUTO: 0 10E3/UL (ref 0–0.2)
BASOPHILS NFR BLD AUTO: 1 %
BILIRUB SERPL-MCNC: 0.2 MG/DL
BUN SERPL-MCNC: 16.3 MG/DL (ref 8–23)
CALCIUM SERPL-MCNC: 9.6 MG/DL (ref 8.8–10.2)
CHLORIDE SERPL-SCNC: 99 MMOL/L (ref 98–107)
CREAT SERPL-MCNC: 1.34 MG/DL (ref 0.51–0.95)
DEPRECATED HCO3 PLAS-SCNC: 27 MMOL/L (ref 22–29)
EGFRCR SERPLBLD CKD-EPI 2021: 42 ML/MIN/1.73M2
EOSINOPHIL # BLD AUTO: 0 10E3/UL (ref 0–0.7)
EOSINOPHIL NFR BLD AUTO: 1 %
ERYTHROCYTE [DISTWIDTH] IN BLOOD BY AUTOMATED COUNT: 14.3 % (ref 10–15)
GLUCOSE SERPL-MCNC: 94 MG/DL (ref 70–99)
HCT VFR BLD AUTO: 33.4 % (ref 35–47)
HGB BLD-MCNC: 11.3 G/DL (ref 11.7–15.7)
IMM GRANULOCYTES # BLD: 0 10E3/UL
IMM GRANULOCYTES NFR BLD: 0 %
LAB DIRECTOR COMMENTS: NORMAL
LAB DIRECTOR COMMENTS: NORMAL
LAB DIRECTOR DISCLAIMER: NORMAL
LAB DIRECTOR DISCLAIMER: NORMAL
LAB DIRECTOR INTERPRETATION: NORMAL
LAB DIRECTOR INTERPRETATION: NORMAL
LAB DIRECTOR METHODOLOGY: NORMAL
LAB DIRECTOR METHODOLOGY: NORMAL
LAB DIRECTOR RESULTS: NORMAL
LAB DIRECTOR RESULTS: NORMAL
LDH SERPL L TO P-CCNC: 270 U/L (ref 0–250)
LYMPHOCYTES # BLD AUTO: 2.8 10E3/UL (ref 0–5.3)
LYMPHOCYTES NFR BLD AUTO: 48 %
MCH RBC QN AUTO: 34.5 PG (ref 26.5–33)
MCHC RBC AUTO-ENTMCNC: 33.8 G/DL (ref 31.5–36.5)
MCV RBC AUTO: 102 FL (ref 78–100)
MONOCYTES # BLD AUTO: 0.4 10E3/UL (ref 0–1.3)
MONOCYTES NFR BLD AUTO: 7 %
NEUTROPHILS # BLD AUTO: 2.6 10E3/UL (ref 1.6–8.3)
NEUTROPHILS NFR BLD AUTO: 44 %
NRBC # BLD AUTO: 0 10E3/UL
NRBC BLD AUTO-RTO: 0 /100
PLATELET # BLD AUTO: 264 10E3/UL (ref 150–450)
POTASSIUM SERPL-SCNC: 3.8 MMOL/L (ref 3.4–5.3)
PROT SERPL-MCNC: 7.1 G/DL (ref 6.4–8.3)
RBC # BLD AUTO: 3.28 10E6/UL (ref 3.8–5.2)
SODIUM SERPL-SCNC: 136 MMOL/L (ref 135–145)
SPECIMEN DESCRIPTION: NORMAL
SPECIMEN DESCRIPTION: NORMAL
WBC # BLD AUTO: 5.8 10E3/UL (ref 4–11)

## 2024-03-06 PROCEDURE — 36415 COLL VENOUS BLD VENIPUNCTURE: CPT | Mod: ZL

## 2024-03-06 PROCEDURE — 85004 AUTOMATED DIFF WBC COUNT: CPT | Mod: ZL

## 2024-03-06 PROCEDURE — 83615 LACTATE (LD) (LDH) ENZYME: CPT | Mod: ZL

## 2024-03-06 PROCEDURE — 81206 BCR/ABL1 GENE MAJOR BP: CPT | Mod: ZL

## 2024-03-06 PROCEDURE — G0452 MOLECULAR PATHOLOGY INTERPR: HCPCS | Mod: 26 | Performed by: STUDENT IN AN ORGANIZED HEALTH CARE EDUCATION/TRAINING PROGRAM

## 2024-03-06 PROCEDURE — 81207 BCR/ABL1 GENE MINOR BP: CPT | Mod: ZL

## 2024-03-06 PROCEDURE — 80053 COMPREHEN METABOLIC PANEL: CPT | Mod: ZL

## 2024-03-14 ENCOUNTER — ONCOLOGY VISIT (OUTPATIENT)
Dept: ONCOLOGY | Facility: OTHER | Age: 69
End: 2024-03-14
Attending: INTERNAL MEDICINE
Payer: MEDICARE

## 2024-03-14 VITALS
DIASTOLIC BLOOD PRESSURE: 64 MMHG | RESPIRATION RATE: 16 BRPM | BODY MASS INDEX: 27.29 KG/M2 | OXYGEN SATURATION: 98 % | WEIGHT: 164 LBS | TEMPERATURE: 97.7 F | SYSTOLIC BLOOD PRESSURE: 126 MMHG | HEART RATE: 82 BPM

## 2024-03-14 DIAGNOSIS — C92.11 CHRONIC MYELOID LEUKEMIA IN REMISSION (H): Primary | ICD-10-CM

## 2024-03-14 PROCEDURE — G0463 HOSPITAL OUTPT CLINIC VISIT: HCPCS

## 2024-03-14 PROCEDURE — 99215 OFFICE O/P EST HI 40 MIN: CPT | Performed by: INTERNAL MEDICINE

## 2024-03-14 PROCEDURE — 99417 PROLNG OP E/M EACH 15 MIN: CPT | Performed by: INTERNAL MEDICINE

## 2024-03-14 ASSESSMENT — PAIN SCALES - GENERAL: PAINLEVEL: NO PAIN (0)

## 2024-03-14 NOTE — PROGRESS NOTES
[facility] Hematology and Oncology Progress Note    Patient: Alison Norwood  MRN: [id number]  Date of Service: Mar 14, 2024         Reason for Visit    Chief Complaint   Patient presents with    Hematology     F/U Chronic myeloid leukemia       ECOG Performance Status: 0        Encounter Diagnoses:    CML chronic phase      History of Present Illness    Ms. 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  For details of history see previous notes.She had originally been diagnosed in November 2020 after she presented with hyperleukocytosis and splenomegaly.  She was diagnosed with CML with a BCR ABL translocation of the p210 gene..  Was eventually started on Gleevec 400 mg p.o. daily and has been on this since then with hematologic and cytogenetic remission.  She was last seen by Danae Tomas nurse practitioner noted that her BCR/ABL1/ minor p190 was less than 0.031%.  The previous result was read as undetectable.  Sample did contain less than 10 copies of the BCR/ABL 1 transcript therefore accurate quantitation was not possible.  She has remained asymptomatic and in hematologic remission and cytogenetic remission based on an undetectable BCR/ABL 1 major P2 10 transcript.  The P1 90 is not very accurate as there is hardly any accurate assessment of this gene mutation.  In the past she has been as high as less than 0.065% ratio.  Patient currently resting relatively well she denies any fevers night sweats weight loss diarrhea.  There are no reports of bleeding episodes no bright red blood per rectum hematemesis melena.      ______________________________________________________________________________    Past History    Past Medical History:   Diagnosis Date    CML (chronic myelocytic leukemia) (H) 2003       Past Surgical History:   Procedure Laterality Date    AS BLEPHAROPLASTY UPPER LID W EXCESS SKIN Bilateral     BUNIONECTOMY Left 2019    COLONOSCOPY  2011    CYSTECTOMY OVARIAN BENIGN       IR PORT CHECK RIGHT  8/7/2020    OPEN REDUCTION INTERNAL FIXATION FOOT Right 01/2020    Paul's fracture           Review of systems.  CNS: There are no headaches, no blurred vision, no change in mental status,   ENT: There is no hearing loss.  Respiratory: There is no cough hemoptysis or shortness of breath  Cardiac: There is no chest pain, orthopnea, PND, or ankle edema.  GI: There is no bright red blood per rectum, no hematemesis, no reflux, no diarrhea or constipation  Musculoskeletal: There are no joint pains  : There is no urinary frequency, hematuria.  Constitutional: There is no fevers, night sweats, weight loss.  Endocrine: There is no fatigue  Neuro: There is no tingling or numbness in the hands or feet.  Hematologic: There is no gingival bleeding, epistaxis, or easy bruisability.  Dermatologic: There is no skin rash.  A 14 point review of systems is otherwise negative.          Physical Exam    /64   Pulse 82   Temp 97.7  F (36.5  C) (Tympanic)   Resp 16   Wt 74.4 kg (164 lb)   SpO2 98%   BMI 27.29 kg/m        GENERAL: Alert and oriented to time place and person. Seated comfortably. In no distress.    HEAD: Atraumatic and normocephalic.    EYES: FADIA, EOMI.  No pallor.  No icterus.    Oral cavity: no mucosal lesion or tonsillar enlargement.    NECK: supple. JVP normal.  No thyroid enlargement.    LYMPH NODES: There are no palpable cervical, supraclavicular, axillary, or inguinal nodes.    LUNGS: clear to auscultation bilaterally.  Resonant to percussion throughout bilaterally.  Symmetrical breath movements bilaterally.    HEART: S1 and S2 are heard. Regular rate and rhythm.  No murmur or gallop or rub heard.  No peripheral edema.    ABDOMEN: Soft. Not tender. Not distended.  No palpable hepatomegaly or splenomegaly.  No other mass palpable.  Bowel sounds heard.    EXTREMITIES: There is no ankle edema.  SKIN: no rash, or bruising or purpura.    NEURO: Grossly non-focal.    Lab  Results    Recent Results (from the past 240 hour(s))   BCR ABL1 Minor Breakpoint Quant p190    Collection Time: 03/06/24  1:30 PM   Result Value Ref Range    RESULTS       BCR::ABL1 MINOR(p190) QUANTITATION RESULTS:  BCR::ABL1/ABL1 minor(p190):  SEE COMMENT    INTERNAL CONTROL (NUMBER OF ABL1 TRANSCRIPTS): 32,200      INTERPRETATION       Molecular testing performed on submitted Blood.    This sample is positive for BCR::ABL1 transcripts of the minor(p190) breakpoint cluster regions with a BCR::ABL1/ABL1 ratio of <0.031%.  The limit of sensitivity of the quantitative BCR::ABL1 minor(p190) assay is 10 copies of the BCR::ABL1 transcripts. This sample contains less than 10 copies of BCR::ABL1 transcripts, therefore; accurate quantitation is not possible.    (Electronically signed by: Aidan Haley MD March 12, 2024 4:38 PM)      COMMENTS       The limit of sensitivity of the quantitative BCR::ABL1 minor(p190) assay is 10 copies of the BCR::ABL1 transcripts.     In [place] chromosome positive (Ph+) acute lymphoblastic leukemia (ALL) the BCR::ABL1 minor breakpoint (p190) is present in approximately 90% of pediatric cases and 60% of adults cases.  In the remaining cases the major breakpoint (p210) is present.  The vast majority of CML cases have the major breakpoint; however, some of these cases will also have a small amount of detectable minor breakpoint (p190) due to alternative splicing and very rare cases (less than 1%) will only have the minor breakpoint.   In the setting of CML, it is unclear if a 3 log decrease in the minor breakpoint BCR::ABL1/ABL1 has the same clinical significance of a 3 log reduction in the major breakpoint.  However, this quantitative test allows for monitoring of disease response and minimal residual disease.    References:  SHOLA Lance, et al for the [place] Adult Leukemia Study Group(2008), Prospective monitoring of BCR-ABL1 transcript levels in patients with [place]  chromosome-positive acute lymphoblastic leukemia undergoing imatinib-combined chemotherapy.  [place] Journal of Haematology, 143:506-510.    Please note, comparisons of bone marrow and blood samples may not be clinically appropriate.      METHODOLOGY       Total cellular RNA was extracted from above specimen and reverse transcribed to cDNA via random hexamer priming. The cDNA was amplified by a real time quantitative PCR assay (using an PB[employer] Concierge 3 Real-time analyzer) with an ABL1 specific primer and a primer specific for exon 1 of the BCR gene. An internal control (ABL1 gene), was amplified to confirm the presence of patient RNA. The presence of at least 1000 copies of the ABL1 gene was considered a cutoff value for determining an adequate specimen for quantitative analysis of the BCR::ABL1 translocation. The results are reported as a ratio of the number of BCR::ABL1 copies to the number of ABL1 copies to normalize for differences in amount of RNA in the reaction and enable serial monitoring of the BCR::ABL1 transcript numbers.      DISCLAIMER       This test was developed and its performance characteristics determined by [facility]. It has not been cleared or approved by the FDA. The laboratory is regulated under CLIA as qualified to perform high-complexity testing. This test is used for clinical purposes. It should not be regarded as investigational or for research.    A resident/fellow in an accredited training program was involved in the selection of testing, review of laboratory data, and/or interpretation of this case.  I, as the senior physician, attest that I: (i) confirmed appropriate testing, (ii) examined the relevant raw data for the specimen(s); and (iii) rendered or confirmed the interpretation(s).        Specimen Description       Blood: EDTA x2     BCR ABL1 Major Breakpoint Quant p210    Collection Time: 03/06/24  1:30 PM   Result Value Ref Range     RESULTS       BCR::ABL1 MAJOR(p210) QUANTITATION RESULTS:  BCR::ABL1/ABL1 Major(p210):  UNDETECTABLE    INTERNAL CONTROL (NUMBER OF ABL1 TRANSCRIPTS): 36,900      INTERPRETATION       INTERPRETATION:  Molecular testing performed on submitted Blood.    BCR::ABL1 transcripts of the major (p210) breakpoint cluster regions are undetectable in this sample.  (Electronically signed by: Aidan Haley MD March 13, 2024 4:41 PM)      COMMENTS       The limit of sensitivity of the quantitative BCR::ABL1 major (p210) assay is 10 copies of the BCR::ABL transcripts.   Individual measurements of BCR::ABL1 transcript levels yield limited prognostic information; serial monitoring of BCR::ABL1 transcript levels (at 3 or 6 month intervals) allows for clinically relevant assessment of response to therapy at the molecular level. A major molecular response has been defined as a 3-log decrease in BCR::ABL1/ABL1 from a baseline value. The patient's own BCR::ABL1/ABL1 ratio performed by the same laboratory serves as the definitive baseline from which a three log decrease is measured. However, in practice this value is not always available. Therefore the concept of the laboratory-specific mean pre-treatment baseline value has been established. In our laboratory, the mean pre-treatment BCR::ABL1/ABL1 in CML patients is 122% (range: 68% to 217%). Therefore, a three log reduction from the mean pre-treatment baseline in our laboratory is a BCR::ABL1/ABL1 value of 0.1%.    References:  N Engl J Med 2003; 17:2318, Int J Oncol 2006;28:1099, Blood 2007;108:28    Please note, comparisons of bone marrow and blood samples may not be clinically appropriate.      METHODOLOGY       Total cellular RNA was extracted from above specimen and reverse transcribed to cDNA via random hexamer priming. The cDNA was amplified by a real time quantitative PCR assay (using an [employer] Digitalo 3 Real-time analyzer) with an ABL1 specific primer and a primer specific for  exon 13 of the BCR gene. An internal control (ABL1 gene), was amplified to confirm the presence of patient RNA. The presence of at least 1000 copies of the ABL1 gene was considered a cutoff value for determining an adequate specimen for quantitative analysis of the BCR::ABL1 translocation. The results are reported as a ratio of the number of BCR::ABL1 copies to the number of ABL1 copies to normalize for differences in amount of RNA in the reaction and enable serial monitoring of the BCR::ABL1 transcript numbers.  This test has been calibrated to the international scale(IS) using a validated reference standard.      DISCLAIMER       This test was developed and its performance characteristics determined by [facility]. It has not been cleared or approved by the FDA. The laboratory is regulated under CLIA as qualified to perform high-complexity testing. This test is used for clinical purposes. It should not be regarded as investigational or for research.    A resident/fellow in an accredited training program was involved in the selection of testing, review of laboratory data, and/or interpretation of this case.  I, as the senior physician, attest that I: (i) confirmed appropriate testing, (ii) examined the relevant raw data for the specimen(s); and (iii) rendered or confirmed the interpretation(s).        Specimen Description       Blood: EDTA x2     Lactate Dehydrogenase    Collection Time: 03/06/24  1:30 PM   Result Value Ref Range    Lactate Dehydrogenase 270 (H) 0 - 250 U/L   Comprehensive metabolic panel    Collection Time: 03/06/24  1:30 PM   Result Value Ref Range    Sodium 136 135 - 145 mmol/L    Potassium 3.8 3.4 - 5.3 mmol/L    Carbon Dioxide (CO2) 27 22 - 29 mmol/L    Anion Gap 10 7 - 15 mmol/L    Urea Nitrogen 16.3 8.0 - 23.0 mg/dL    Creatinine 1.34 (H) 0.51 - 0.95 mg/dL    GFR Estimate 42 (L) >60 mL/min/1.73m2    Calcium 9.6 8.8 - 10.2 mg/dL    Chloride 99 98 - 107  mmol/L    Glucose 94 70 - 99 mg/dL    Alkaline Phosphatase 85 40 - 150 U/L    AST 28 0 - 45 U/L    ALT 23 0 - 50 U/L    Protein Total 7.1 6.4 - 8.3 g/dL    Albumin 4.4 3.5 - 5.2 g/dL    Bilirubin Total 0.2 <=1.2 mg/dL   CBC with platelets and differential    Collection Time: 03/06/24  1:30 PM   Result Value Ref Range    WBC Count 5.8 4.0 - 11.0 10e3/uL    RBC Count 3.28 (L) 3.80 - 5.20 10e6/uL    Hemoglobin 11.3 (L) 11.7 - 15.7 g/dL    Hematocrit 33.4 (L) 35.0 - 47.0 %     (H) 78 - 100 fL    MCH 34.5 (H) 26.5 - 33.0 pg    MCHC 33.8 31.5 - 36.5 g/dL    RDW 14.3 10.0 - 15.0 %    Platelet Count 264 150 - 450 10e3/uL    % Neutrophils 44 %    % Lymphocytes 48 %    % Monocytes 7 %    % Eosinophils 1 %    % Basophils 1 %    % Immature Granulocytes 0 %    NRBCs per 100 WBC 0 <1 /100    Absolute Neutrophils 2.6 1.6 - 8.3 10e3/uL    Absolute Lymphocytes 2.8 0.0 - 5.3 10e3/uL    Absolute Monocytes 0.4 0.0 - 1.3 10e3/uL    Absolute Eosinophils 0.0 0.0 - 0.7 10e3/uL    Absolute Basophils 0.0 0.0 - 0.2 10e3/uL    Absolute Immature Granulocytes 0.0 <=0.4 10e3/uL    Absolute NRBCs 0.0 10e3/uL       Imaging    No results found.    Assessment and Plan: #1:CML in chronic phase diagnosed in 2003: Remains in hematologic: Molecular, cytogenetic remission on imatinib 400 mg p.o. daily.  The plan is to see patient in 6 months repeat CBC CMP LDH as well as BCR/ABL 1 major and minor transcript.    Time spent: 68 minutes was spent on this total patient visit: Reviewed multiple lab results with the patient, reviewed previous provider notes, performing history physical, documenting history physical, and ordered follow-up labs and appointments    Cancer Staging   No matching staging information was found for the patient.        Signed by: Robin Rosales MD    CC: Physician No Ref-Primary

## 2024-03-14 NOTE — NURSING NOTE
"Oncology Rooming Note    March 14, 2024 9:15 AM   Alison Norwood is a 69 year old female who presents for:    Chief Complaint   Patient presents with    Hematology     F/U Chronic myeloid leukemia     Initial Vitals: /64   Pulse 82   Temp 97.7  F (36.5  C) (Tympanic)   Resp 16   Wt 74.4 kg (164 lb)   SpO2 98%   BMI 27.29 kg/m   Estimated body mass index is 27.29 kg/m  as calculated from the following:    Height as of 6/29/23: 1.651 m (5' 5\").    Weight as of this encounter: 74.4 kg (164 lb). Body surface area is 1.85 meters squared.  No Pain (0) Comment: Data Unavailable   No LMP recorded. Patient is postmenopausal.  Allergies reviewed: Yes  Medications reviewed: Yes    Medications: Medication refills not needed today.  Pharmacy name entered into [employer]:    [facility] - [place], [place] - [address] [facility] - [place], [place] - [address] [facility] - [place], [place] - [address] [place] [address] [address]    Frailty Screening:   Is the patient here for a new oncology consult visit in cancer care? 2. No      Clinical concerns: None       Yumiko Cruz CMA ([facility])  "

## 2024-08-01 ENCOUNTER — TELEPHONE (OUTPATIENT)
Dept: ONCOLOGY | Facility: CLINIC | Age: 69
End: 2024-08-01
Payer: MEDICARE

## 2024-08-01 NOTE — TELEPHONE ENCOUNTER
Prior Authorization Approval    Medication: IMATINIB MESYLATE 400 MG PO TABS  Authorization Effective Date: 7/2/2024  Authorization Expiration Date: 8/1/2025  Approved Dose/Quantity: 30 for 30 days  Reference #: [id number]   Insurance Company: "[email or web address] - Phone [phone] Fax [phone]  Expected CoPay: $    CoPay Card Available:      Financial Assistance Needed: no, currently getting free drug  Which Pharmacy is filling the prescription:    Pharmacy Notified: n/a  Patient Notified: n/a

## 2024-09-04 ENCOUNTER — LAB (OUTPATIENT)
Dept: LAB | Facility: OTHER | Age: 69
End: 2024-09-04
Attending: INTERNAL MEDICINE
Payer: MEDICARE

## 2024-09-04 DIAGNOSIS — C92.11 CHRONIC MYELOID LEUKEMIA IN REMISSION (H): ICD-10-CM

## 2024-09-04 LAB
ALBUMIN SERPL BCG-MCNC: 4.4 G/DL (ref 3.5–5.2)
ALP SERPL-CCNC: 96 U/L (ref 40–150)
ALT SERPL W P-5'-P-CCNC: 18 U/L (ref 0–50)
ANION GAP SERPL CALCULATED.3IONS-SCNC: 8 MMOL/L (ref 7–15)
AST SERPL W P-5'-P-CCNC: 27 U/L (ref 0–45)
BASOPHILS # BLD AUTO: 0.1 10E3/UL (ref 0–0.2)
BASOPHILS NFR BLD AUTO: 1 %
BILIRUB SERPL-MCNC: 0.3 MG/DL
BUN SERPL-MCNC: 13.4 MG/DL (ref 8–23)
CALCIUM SERPL-MCNC: 9.5 MG/DL (ref 8.8–10.4)
CHLORIDE SERPL-SCNC: 100 MMOL/L (ref 98–107)
CREAT SERPL-MCNC: 0.99 MG/DL (ref 0.51–0.95)
EGFRCR SERPLBLD CKD-EPI 2021: 61 ML/MIN/1.73M2
EOSINOPHIL # BLD AUTO: 0.1 10E3/UL (ref 0–0.7)
EOSINOPHIL NFR BLD AUTO: 1 %
ERYTHROCYTE [DISTWIDTH] IN BLOOD BY AUTOMATED COUNT: 13.8 % (ref 10–15)
GLUCOSE SERPL-MCNC: 93 MG/DL (ref 70–99)
HCO3 SERPL-SCNC: 29 MMOL/L (ref 22–29)
HCT VFR BLD AUTO: 35 % (ref 35–47)
HGB BLD-MCNC: 11.5 G/DL (ref 11.7–15.7)
IMM GRANULOCYTES # BLD: 0 10E3/UL
IMM GRANULOCYTES NFR BLD: 0 %
LAB DIRECTOR COMMENTS: NORMAL
LAB DIRECTOR COMMENTS: NORMAL
LAB DIRECTOR DISCLAIMER: NORMAL
LAB DIRECTOR DISCLAIMER: NORMAL
LAB DIRECTOR INTERPRETATION: NORMAL
LAB DIRECTOR INTERPRETATION: NORMAL
LAB DIRECTOR METHODOLOGY: NORMAL
LAB DIRECTOR METHODOLOGY: NORMAL
LAB DIRECTOR RESULTS: NORMAL
LAB DIRECTOR RESULTS: NORMAL
LDH SERPL L TO P-CCNC: 238 U/L (ref 0–250)
LOCATION OF TASK: NORMAL
LYMPHOCYTES # BLD AUTO: 2.2 10E3/UL (ref 0.8–5.3)
LYMPHOCYTES NFR BLD AUTO: 47 %
MCH RBC QN AUTO: 34.1 PG (ref 26.5–33)
MCHC RBC AUTO-ENTMCNC: 32.9 G/DL (ref 31.5–36.5)
MCV RBC AUTO: 104 FL (ref 78–100)
MONOCYTES # BLD AUTO: 0.5 10E3/UL (ref 0–1.3)
MONOCYTES NFR BLD AUTO: 10 %
NEUTROPHILS # BLD AUTO: 2 10E3/UL (ref 1.6–8.3)
NEUTROPHILS NFR BLD AUTO: 41 %
NRBC # BLD AUTO: 0 10E3/UL
NRBC BLD AUTO-RTO: 0 /100
PLATELET # BLD AUTO: 315 10E3/UL (ref 150–450)
POTASSIUM SERPL-SCNC: 4.2 MMOL/L (ref 3.4–5.3)
PROT SERPL-MCNC: 7.5 G/DL (ref 6.4–8.3)
RBC # BLD AUTO: 3.37 10E6/UL (ref 3.8–5.2)
SODIUM SERPL-SCNC: 137 MMOL/L (ref 135–145)
SPECIMEN DESCRIPTION: NORMAL
SPECIMEN DESCRIPTION: NORMAL
WBC # BLD AUTO: 4.8 10E3/UL (ref 4–11)

## 2024-09-04 PROCEDURE — G0452 MOLECULAR PATHOLOGY INTERPR: HCPCS | Mod: 26 | Performed by: PATHOLOGY

## 2024-09-04 PROCEDURE — 81207 BCR/ABL1 GENE MINOR BP: CPT | Mod: ZL

## 2024-09-04 PROCEDURE — 36415 COLL VENOUS BLD VENIPUNCTURE: CPT | Mod: ZL

## 2024-09-04 PROCEDURE — 81206 BCR/ABL1 GENE MAJOR BP: CPT | Mod: ZL

## 2024-09-04 PROCEDURE — G0452 MOLECULAR PATHOLOGY INTERPR: HCPCS | Mod: 26 | Performed by: STUDENT IN AN ORGANIZED HEALTH CARE EDUCATION/TRAINING PROGRAM

## 2024-09-04 PROCEDURE — 80053 COMPREHEN METABOLIC PANEL: CPT | Mod: ZL

## 2024-09-04 PROCEDURE — 85025 COMPLETE CBC W/AUTO DIFF WBC: CPT | Mod: ZL

## 2024-09-04 PROCEDURE — 83615 LACTATE (LD) (LDH) ENZYME: CPT | Mod: ZL

## 2024-09-11 ENCOUNTER — ONCOLOGY VISIT (OUTPATIENT)
Dept: ONCOLOGY | Facility: OTHER | Age: 69
End: 2024-09-11
Attending: INTERNAL MEDICINE
Payer: MEDICARE

## 2024-09-11 VITALS
HEART RATE: 106 BPM | DIASTOLIC BLOOD PRESSURE: 74 MMHG | WEIGHT: 171 LBS | RESPIRATION RATE: 16 BRPM | TEMPERATURE: 97.7 F | HEIGHT: 65 IN | SYSTOLIC BLOOD PRESSURE: 136 MMHG | BODY MASS INDEX: 28.49 KG/M2 | OXYGEN SATURATION: 98 %

## 2024-09-11 DIAGNOSIS — C92.11 CHRONIC MYELOID LEUKEMIA IN REMISSION (H): ICD-10-CM

## 2024-09-11 PROCEDURE — 99215 OFFICE O/P EST HI 40 MIN: CPT | Performed by: INTERNAL MEDICINE

## 2024-09-11 PROCEDURE — 99417 PROLNG OP E/M EACH 15 MIN: CPT | Performed by: INTERNAL MEDICINE

## 2024-09-11 PROCEDURE — G0463 HOSPITAL OUTPT CLINIC VISIT: HCPCS

## 2024-09-11 RX ORDER — SACCHAROMYCES BOULARDII 250 MG
250 CAPSULE ORAL DAILY
COMMUNITY

## 2024-09-11 ASSESSMENT — ENCOUNTER SYMPTOMS
HEMATOLOGIC/LYMPHATIC NEGATIVE: 1
PSYCHIATRIC NEGATIVE: 1
MUSCULOSKELETAL NEGATIVE: 1
RESPIRATORY NEGATIVE: 1
ENDOCRINE NEGATIVE: 1
ALLERGIC/IMMUNOLOGIC NEGATIVE: 1
EYES NEGATIVE: 1
NEUROLOGICAL NEGATIVE: 1
CARDIOVASCULAR NEGATIVE: 1
CONSTITUTIONAL NEGATIVE: 1
GASTROINTESTINAL NEGATIVE: 1

## 2024-09-11 ASSESSMENT — PAIN SCALES - GENERAL: PAINLEVEL: NO PAIN (0)

## 2024-09-11 NOTE — PROGRESS NOTES
[facility] Hematology and Oncology Progress Note    Patient: Alison Norwood  MRN: [id number]  Date of Service: Sep 11, 2024         Reason for Visit    Chief Complaint   Patient presents with    Oncology Clinic Visit     Chronic Myeloid Leukemia       ECOG Performance Status: 0      Encounter Diagnoses: Chronic myelogenous leukemia in chronic phase      History of Present Illness:    Ms. 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  For details of history see previous notesWe had originally seen the patient in consultation at the request of  Follow back on November 11, 2020.  At that time she was 65-year-old white female who presented while living in [place] in 2003 with hyperleukocytosis/splenomegaly and was diagnosed with CML with BCR-ABL translocation of the P210 gene.  She was treated initially with Hydrea and then subsequently was placed on imatinib at a dose of 1 mg p.o. daily.  He simply moved to [place] to establish care with us.  It was noted that she was in hematologic/cytogenetic/molecular remission over the past 15 years.  She was tolerating imatinib well.  She had some problems with diarrhea initially but this eventually resolved.  She continues to remain molecular/cytogenetic remission with BCR/ABL 1 major p210 undetectable to obtain being undetectable.  She had been seen by Danae Tomas nurse practitioner in January of this year and was noted that the BCR/ABL 1/minor P1 90 was less than 0.031% there is considered inaccurate as the sample contained less than 10 copies of the BCR/ABL 1 transcript.  The P2 10 transcript remained undetectable.  It was noted that the patient had a higher percentage in the past of the P1 90 transcript.  Therefore we felt there was not accurate assessment.  She comes in today she is doing well she denies any fevers, night sweats weight loss, she denies any easy bruisability gingival bleeding epistaxis.  She denies any change in bowel  "habits.  There is no diarrhea.  There is no bright red blood per rectum, hematemesis, hematochezia.  She denies any dyspnea cough or hemoptysis.  Today her BCR/ABL 1 major and minor transcripts remain undetectable      ______________________________________________________________________________    Past History    Past Medical History:   Diagnosis Date    CML (chronic myelocytic leukemia) (H) 2003       Past Surgical History:   Procedure Laterality Date    AS BLEPHAROPLASTY UPPER LID W EXCESS SKIN Bilateral     BUNIONECTOMY Left 2019    COLONOSCOPY  2011    CYSTECTOMY OVARIAN BENIGN      IR PORT CHECK RIGHT  8/7/2020    OPEN REDUCTION INTERNAL FIXATION FOOT Right 01/2020    Paul's fracture       Review of Systems   Constitutional: Negative.    HENT: Negative.     Eyes: Negative.    Respiratory: Negative.     Cardiovascular: Negative.    Gastrointestinal: Negative.    Endocrine: Negative.    Genitourinary: Negative.    Musculoskeletal: Negative.    Skin: Negative.    Allergic/Immunologic: Negative.    Neurological: Negative.    Hematological: Negative.    Psychiatric/Behavioral: Negative.     All other systems reviewed and are negative.         Physical Exam    /74 (BP Location: Right arm, Patient Position: Sitting, Cuff Size: Adult Regular)   Pulse 106   Temp 97.7  F (36.5  C) (Tympanic)   Resp 16   Ht 1.651 m (5' 5\")   Wt 77.6 kg (171 lb)   SpO2 98%   BMI 28.46 kg/m      Physical Exam  Vitals and nursing note reviewed.   Constitutional:       Appearance: Normal appearance. She is normal weight.   HENT:      Head: Normocephalic and atraumatic.      Nose: Nose normal.      Mouth/Throat:      Mouth: Mucous membranes are moist.      Pharynx: Oropharynx is clear.   Eyes:      Extraocular Movements: Extraocular movements intact.      Conjunctiva/sclera: Conjunctivae normal.      Pupils: Pupils are equal, round, and reactive to light.   Cardiovascular:      Rate and Rhythm: Normal rate and regular rhythm.    "   Pulses: Normal pulses.      Heart sounds: Normal heart sounds. No murmur heard.  Pulmonary:      Effort: Pulmonary effort is normal.      Breath sounds: Normal breath sounds.   Abdominal:      General: Bowel sounds are normal. There is no distension.      Palpations: Abdomen is soft. There is no mass.      Tenderness: There is no abdominal tenderness.   Musculoskeletal:         General: Normal range of motion.      Cervical back: Normal range of motion and neck supple.      Comments: No ankle edema.   Lymphadenopathy:      Cervical: No cervical adenopathy.   Skin:     General: Skin is warm and dry.   Neurological:      General: No focal deficit present.      Mental Status: She is alert and oriented to person, place, and time.   Psychiatric:         Mood and Affect: Mood normal.         Behavior: Behavior normal.          Lab Results    Recent Results (from the past 240 hour(s))   Comprehensive metabolic panel    Collection Time: 09/04/24  9:03 AM   Result Value Ref Range    Sodium 137 135 - 145 mmol/L    Potassium 4.2 3.4 - 5.3 mmol/L    Carbon Dioxide (CO2) 29 22 - 29 mmol/L    Anion Gap 8 7 - 15 mmol/L    Urea Nitrogen 13.4 8.0 - 23.0 mg/dL    Creatinine 0.99 (H) 0.51 - 0.95 mg/dL    GFR Estimate 61 >60 mL/min/1.73m2    Calcium 9.5 8.8 - 10.4 mg/dL    Chloride 100 98 - 107 mmol/L    Glucose 93 70 - 99 mg/dL    Alkaline Phosphatase 96 40 - 150 U/L    AST 27 0 - 45 U/L    ALT 18 0 - 50 U/L    Protein Total 7.5 6.4 - 8.3 g/dL    Albumin 4.4 3.5 - 5.2 g/dL    Bilirubin Total 0.3 <=1.2 mg/dL   Lactate Dehydrogenase    Collection Time: 09/04/24  9:03 AM   Result Value Ref Range    Lactate Dehydrogenase 238 0 - 250 U/L   BCR ABL1 Major Breakpoint Quant p210    Collection Time: 09/04/24  9:03 AM   Result Value Ref Range    RESULTS       BCR::ABL1 MAJOR(p210) QUANTITATION RESULTS:  BCR::ABL1/ABL1 Major(p210):  UNDETECTABLE    INTERNAL CONTROL (NUMBER OF ABL1 TRANSCRIPTS): 5,330      INTERPRETATION        INTERPRETATION:  Molecular testing performed on submitted Blood.    BCR::ABL1 transcripts of the major (p210) breakpoint cluster regions are undetectable in this sample.  (Electronically signed by: Zen Dockery MD September 6, 2024 2:06 PM)      COMMENTS       The limit of sensitivity of the quantitative BCR::ABL1 major (p210) assay is 10 copies of the BCR::ABL transcripts.   Individual measurements of BCR::ABL1 transcript levels yield limited prognostic information; serial monitoring of BCR::ABL1 transcript levels (at 3 or 6 month intervals) allows for clinically relevant assessment of response to therapy at the molecular level. A major molecular response has been defined as a 3-log decrease in BCR::ABL1/ABL1 from a baseline value. The patient's own BCR::ABL1/ABL1 ratio performed by the same laboratory serves as the definitive baseline from which a three log decrease is measured. However, in practice this value is not always available. Therefore the concept of the laboratory-specific mean pre-treatment baseline value has been established. In our laboratory, the mean pre-treatment BCR::ABL1/ABL1 in CML patients is 122% (range: 68% to 217%). Therefore, a three log reduction from the mean pre-treatment baseline in our laboratory is a BCR::ABL1/ABL1 value of 0.1%.    References:  N Engl J Med 2003; 17:2318, Int J Oncol 2006;28:1099, Blood 2007;108:28    Please note, comparisons of bone marrow and blood samples may not be clinically appropriate.      METHODOLOGY       Total cellular RNA was extracted from above specimen and reverse transcribed to cDNA via random hexamer priming. The cDNA was amplified by a real time quantitative PCR assay (using an [employer] 3 Real-time analyzer) with an ABL1 specific primer and a primer specific for exon 13 of the BCR gene. An internal control (ABL1 gene), was amplified to confirm the presence of patient RNA. The presence of at least 1000 copies of the ABL1 gene was considered a  cutoff value for determining an adequate specimen for quantitative analysis of the BCR::ABL1 translocation. The results are reported as a ratio of the number of BCR::ABL1 copies to the number of ABL1 copies to normalize for differences in amount of RNA in the reaction and enable serial monitoring of the BCR::ABL1 transcript numbers.  This test has been calibrated to the international scale(IS) using a validated reference standard.      Signout Location if Remote Report signed out at:   PAM1     DISCLAIMER       This test was developed and its performance characteristics determined by [facility]. It has not been cleared or approved by the FDA. The laboratory is regulated under CLIA as qualified to perform high-complexity testing. This test is used for clinical purposes. It should not be regarded as investigational or for research.    A resident/fellow in an accredited training program was involved in the selection of testing, review of laboratory data, and/or interpretation of this case.  I, as the senior physician, attest that I: (i) confirmed appropriate testing, (ii) examined the relevant raw data for the specimen(s); and (iii) rendered or confirmed the interpretation(s).        Specimen Description       Blood: ACD x2     BCR ABL1 Minor Breakpoint Quant p190    Collection Time: 09/04/24  9:03 AM   Result Value Ref Range    RESULTS       BCR::ABL1 MINOR(p190) QUANTITATION RESULTS:  BCR::ABL1/ABL1 minor(p190):  UNDETECTABLE    INTERNAL CONTROL (NUMBER OF ABL1 TRANSCRIPTS): 17,000      INTERPRETATION       Molecular testing performed on submitted Blood.    BCR::ABL1 transcripts of the minor(p190) breakpoint cluster regions are undetectable in this sample.  (Electronically signed by: BEL YIN MD September 10, 2024 3:41 PM)      COMMENTS       The limit of sensitivity of the quantitative BCR::ABL1 assay is 10 copies of the BCR::ABL1 transcripts.    Please note,  comparisons of bone marrow and blood samples may not be clinically appropriate.      METHODOLOGY       Total cellular RNA was extracted from above specimen and reverse transcribed to cDNA via random hexamer priming. The cDNA was amplified by a real time quantitative PCR assay (using an [employer] 3 Real-time analyzer) with an ABL1 specific primer and a primer specific for exon 1 of the BCR gene. An internal control (ABL1 gene), was amplified to confirm the presence of patient RNA. The presence of at least 1000 copies of the ABL1 gene was considered a cutoff value for determining an adequate specimen for quantitative analysis of the BCR::ABL1 translocation. The results are reported as a ratio of the number of BCR::ABL1 copies to the number of ABL1 copies to normalize for differences in amount of RNA in the reaction and enable serial monitoring of the BCR::ABL1 transcript numbers.      DISCLAIMER       This test was developed and its performance characteristics determined by [facility]. It has not been cleared or approved by the FDA. The laboratory is regulated under CLIA as qualified to perform high-complexity testing. This test is used for clinical purposes. It should not be regarded as investigational or for research.    A resident/fellow in an accredited training program was involved in the selection of testing, review of laboratory data, and/or interpretation of this case.  I, as the senior physician, attest that I: (i) confirmed appropriate testing, (ii) examined the relevant raw data for the specimen(s); and (iii) rendered or confirmed the interpretation(s).        Specimen Description       Blood: ACD x2     CBC with platelets and differential    Collection Time: 09/04/24  9:03 AM   Result Value Ref Range    WBC Count 4.8 4.0 - 11.0 10e3/uL    RBC Count 3.37 (L) 3.80 - 5.20 10e6/uL    Hemoglobin 11.5 (L) 11.7 - 15.7 g/dL    Hematocrit 35.0 35.0 - 47.0 %      (H) 78 - 100 fL    MCH 34.1 (H) 26.5 - 33.0 pg    MCHC 32.9 31.5 - 36.5 g/dL    RDW 13.8 10.0 - 15.0 %    Platelet Count 315 150 - 450 10e3/uL    % Neutrophils 41 %    % Lymphocytes 47 %    % Monocytes 10 %    % Eosinophils 1 %    % Basophils 1 %    % Immature Granulocytes 0 %    NRBCs per 100 WBC 0 <1 /100    Absolute Neutrophils 2.0 1.6 - 8.3 10e3/uL    Absolute Lymphocytes 2.2 0.8 - 5.3 10e3/uL    Absolute Monocytes 0.5 0.0 - 1.3 10e3/uL    Absolute Eosinophils 0.1 0.0 - 0.7 10e3/uL    Absolute Basophils 0.1 0.0 - 0.2 10e3/uL    Absolute Immature Granulocytes 0.0 <=0.4 10e3/uL    Absolute NRBCs 0.0 10e3/uL       Imaging    No results found.    Assessment and Plan: #1 :.  CML chronic phase diagnosed 2003: Patient remains in hematologic/cytogenetic/molecular remission on imatinib 400 mg p.o. daily her BCR/ABL 1 major P2 10 as well as her BCR-ABL 1 minor P1 90 transcript remain undetectable.  Plan is to continue imatinib 400 mg p.o. daily.  We will see the patient in 6 months repeat CBC CMP LDH as well as BCR/ABL p210 and p190 transcripts.  Time spent 62 minutes was spent on this patient visit : spent time reviewing previous physician provider notes, reviewing multiple lab results including molecular and cytogenetic results, discussing lab results with the patient, performing history/physical, documenting history/physical and ordering follow-up labs and appointments.    Cancer Staging   No matching staging information was found for the patient.        Signed by: Robin Rosales MD    CC: Sondra Parish

## 2024-09-11 NOTE — NURSING NOTE
"Oncology Rooming Note    September 11, 2024 9:02 AM   Alison Norwood is a 69 year old female who presents for:    Chief Complaint   Patient presents with    Oncology Clinic Visit     Chronic Myeloid Leukemia     Initial Vitals: /74 (BP Location: Right arm, Patient Position: Sitting, Cuff Size: Adult Regular)   Pulse 106   Temp 97.7  F (36.5  C) (Tympanic)   Resp 16   Wt 77.6 kg (171 lb)   SpO2 98%   BMI 28.46 kg/m   Estimated body mass index is 28.46 kg/m  as calculated from the following:    Height as of 6/29/23: 1.651 m (5' 5\").    Weight as of this encounter: 77.6 kg (171 lb). Body surface area is 1.89 meters squared.  No Pain (0) Comment: Data Unavailable   No LMP recorded. Patient is postmenopausal.  Allergies reviewed: Yes  Medications reviewed: Yes    Medications: Medication refills not needed today.  Pharmacy name entered into [employer]:    [facility] - [place], [place] - [address] [facility] - [place], [place] - [address]  [facility] [employer] [facility] - [address]    Frailty Screening:   Is the patient here for a new oncology consult visit in cancer care? 2. No      Clinical concerns: Nothing specific     Amie Recinos CMA ([facility]) 9/11/2024 9:02 AM  "

## 2025-01-15 DIAGNOSIS — C92.11 CHRONIC MYELOID LEUKEMIA IN REMISSION (H): Primary | ICD-10-CM

## 2025-01-15 RX ORDER — IMATINIB MESYLATE 400 MG/1
400 TABLET, FILM COATED ORAL DAILY
Qty: 30 TABLET | Refills: 0 | Status: SHIPPED | OUTPATIENT
Start: 2025-01-15

## 2025-02-11 DIAGNOSIS — C92.11 CHRONIC MYELOID LEUKEMIA IN REMISSION (H): Primary | ICD-10-CM

## 2025-02-13 DIAGNOSIS — C92.11 CHRONIC MYELOID LEUKEMIA IN REMISSION (H): Primary | ICD-10-CM

## 2025-02-13 RX ORDER — IMATINIB MESYLATE 400 MG/1
400 TABLET, FILM COATED ORAL DAILY
Qty: 30 TABLET | Refills: 0 | Status: SHIPPED | OUTPATIENT
Start: 2025-02-13

## 2025-03-04 ENCOUNTER — LAB (OUTPATIENT)
Dept: LAB | Facility: OTHER | Age: 70
End: 2025-03-04
Attending: NURSE PRACTITIONER
Payer: MEDICARE

## 2025-03-04 DIAGNOSIS — C92.11 CHRONIC MYELOID LEUKEMIA IN REMISSION (H): ICD-10-CM

## 2025-03-04 LAB
ALBUMIN SERPL BCG-MCNC: 4.3 G/DL (ref 3.5–5.2)
ALP SERPL-CCNC: 83 U/L (ref 40–150)
ALT SERPL W P-5'-P-CCNC: 21 U/L (ref 0–50)
ANION GAP SERPL CALCULATED.3IONS-SCNC: 11 MMOL/L (ref 7–15)
AST SERPL W P-5'-P-CCNC: 31 U/L (ref 0–45)
BASOPHILS # BLD AUTO: 0 10E3/UL (ref 0–0.2)
BASOPHILS NFR BLD AUTO: 1 %
BILIRUB SERPL-MCNC: 0.3 MG/DL
BUN SERPL-MCNC: 16.3 MG/DL (ref 8–23)
CALCIUM SERPL-MCNC: 9.1 MG/DL (ref 8.8–10.4)
CHLORIDE SERPL-SCNC: 103 MMOL/L (ref 98–107)
CREAT SERPL-MCNC: 1.02 MG/DL (ref 0.51–0.95)
EGFRCR SERPLBLD CKD-EPI 2021: 59 ML/MIN/1.73M2
EOSINOPHIL # BLD AUTO: 0 10E3/UL (ref 0–0.7)
EOSINOPHIL NFR BLD AUTO: 1 %
ERYTHROCYTE [DISTWIDTH] IN BLOOD BY AUTOMATED COUNT: 14 % (ref 10–15)
GLUCOSE SERPL-MCNC: 93 MG/DL (ref 70–99)
HCO3 SERPL-SCNC: 25 MMOL/L (ref 22–29)
HCT VFR BLD AUTO: 32.3 % (ref 35–47)
HGB BLD-MCNC: 11.3 G/DL (ref 11.7–15.7)
IMM GRANULOCYTES # BLD: 0 10E3/UL
IMM GRANULOCYTES NFR BLD: 0 %
LAB DIRECTOR COMMENTS: NORMAL
LAB DIRECTOR COMMENTS: NORMAL
LAB DIRECTOR DISCLAIMER: NORMAL
LAB DIRECTOR DISCLAIMER: NORMAL
LAB DIRECTOR INTERPRETATION: NORMAL
LAB DIRECTOR INTERPRETATION: NORMAL
LAB DIRECTOR METHODOLOGY: NORMAL
LAB DIRECTOR METHODOLOGY: NORMAL
LAB DIRECTOR RESULTS: NORMAL
LAB DIRECTOR RESULTS: NORMAL
LDH SERPL L TO P-CCNC: 228 U/L (ref 0–250)
LYMPHOCYTES # BLD AUTO: 2.3 10E3/UL (ref 0.8–5.3)
LYMPHOCYTES NFR BLD AUTO: 55 %
MCH RBC QN AUTO: 35.4 PG (ref 26.5–33)
MCHC RBC AUTO-ENTMCNC: 35 G/DL (ref 31.5–36.5)
MCV RBC AUTO: 101 FL (ref 78–100)
MONOCYTES # BLD AUTO: 0.4 10E3/UL (ref 0–1.3)
MONOCYTES NFR BLD AUTO: 10 %
NEUTROPHILS # BLD AUTO: 1.4 10E3/UL (ref 1.6–8.3)
NEUTROPHILS NFR BLD AUTO: 33 %
NRBC # BLD AUTO: 0 10E3/UL
NRBC BLD AUTO-RTO: 0 /100
PLATELET # BLD AUTO: 228 10E3/UL (ref 150–450)
POTASSIUM SERPL-SCNC: 4.1 MMOL/L (ref 3.4–5.3)
PROT SERPL-MCNC: 6.9 G/DL (ref 6.4–8.3)
RBC # BLD AUTO: 3.19 10E6/UL (ref 3.8–5.2)
SODIUM SERPL-SCNC: 139 MMOL/L (ref 135–145)
SPECIMEN TYPE: NORMAL
SPECIMEN TYPE: NORMAL
WBC # BLD AUTO: 4.1 10E3/UL (ref 4–11)

## 2025-03-04 PROCEDURE — 36415 COLL VENOUS BLD VENIPUNCTURE: CPT | Mod: ZL

## 2025-03-04 PROCEDURE — 81207 BCR/ABL1 GENE MINOR BP: CPT | Mod: ZL

## 2025-03-04 PROCEDURE — 83615 LACTATE (LD) (LDH) ENZYME: CPT | Mod: ZL

## 2025-03-04 PROCEDURE — G0452 MOLECULAR PATHOLOGY INTERPR: HCPCS | Mod: 26 | Performed by: STUDENT IN AN ORGANIZED HEALTH CARE EDUCATION/TRAINING PROGRAM

## 2025-03-04 PROCEDURE — 81206 BCR/ABL1 GENE MAJOR BP: CPT | Mod: ZL

## 2025-03-04 PROCEDURE — 82310 ASSAY OF CALCIUM: CPT | Mod: ZL

## 2025-03-04 PROCEDURE — 82947 ASSAY GLUCOSE BLOOD QUANT: CPT | Mod: ZL

## 2025-03-04 PROCEDURE — 85025 COMPLETE CBC W/AUTO DIFF WBC: CPT | Mod: ZL

## 2025-03-04 PROCEDURE — 82565 ASSAY OF CREATININE: CPT | Mod: ZL

## 2025-03-12 ENCOUNTER — ONCOLOGY VISIT (OUTPATIENT)
Dept: ONCOLOGY | Facility: OTHER | Age: 70
End: 2025-03-12
Attending: INTERNAL MEDICINE
Payer: MEDICARE

## 2025-03-12 VITALS
WEIGHT: 171 LBS | BODY MASS INDEX: 28.46 KG/M2 | SYSTOLIC BLOOD PRESSURE: 122 MMHG | OXYGEN SATURATION: 98 % | HEART RATE: 98 BPM | TEMPERATURE: 98.1 F | DIASTOLIC BLOOD PRESSURE: 69 MMHG | RESPIRATION RATE: 12 BRPM

## 2025-03-12 DIAGNOSIS — C92.11 CHRONIC MYELOID LEUKEMIA IN REMISSION (H): Primary | ICD-10-CM

## 2025-03-12 DIAGNOSIS — R79.89 LOW VITAMIN B12 LEVEL: ICD-10-CM

## 2025-03-12 DIAGNOSIS — Z86.39 HX OF IRON DEFICIENCY: ICD-10-CM

## 2025-03-12 DIAGNOSIS — R19.7 DIARRHEA, UNSPECIFIED TYPE: ICD-10-CM

## 2025-03-12 RX ORDER — BETAMETHASONE DIPROPIONATE 0.5 MG/G
LOTION TOPICAL DAILY PRN
COMMUNITY
Start: 2024-11-06

## 2025-03-12 ASSESSMENT — PAIN SCALES - GENERAL: PAINLEVEL_OUTOF10: NO PAIN (0)

## 2025-03-12 NOTE — NURSING NOTE
"Oncology Rooming Note    March 12, 2025 2:12 PM   Alison Norwood is a 70 year old female who presents for:    Chief Complaint   Patient presents with    Oncology Clinic Visit     CML     Initial Vitals: /69 (BP Location: Right arm, Patient Position: Sitting, Cuff Size: Adult Regular)   Pulse 98   Temp 98.1  F (36.7  C) (Tympanic)   Resp 12   Wt 77.6 kg (171 lb)   SpO2 98%   BMI 28.46 kg/m   Estimated body mass index is 28.46 kg/m  as calculated from the following:    Height as of 9/11/24: 1.651 m (5' 5\").    Weight as of this encounter: 77.6 kg (171 lb). Body surface area is 1.89 meters squared.  No Pain (0) Comment: Data Unavailable   No LMP recorded. Patient is postmenopausal.  Allergies reviewed: Yes  Medications reviewed: Yes    Medications: Medication refills not needed today.  Pharmacy name entered into [employer]:    [facility] - [place], [place] - [address] [facility] - [place], [place] [address]  [facility] - [place], [place] - [address]  [facility] - [place], [place] - [address]    Frailty Screening:   Is the patient here for a new oncology consult visit in cancer care? 2. No    PHQ9:  Did this patient require a PHQ9?: No      Clinical concerns: patient has had a couple of episodes of diarrhea recently. Wonders if it is related to the medication and some food interactions.   Emmie Flores CNP was notified.      Amie Recinos CMA              "

## 2025-03-12 NOTE — PROGRESS NOTES
Oncology Follow-up Visit    Reason for Visit:  Юлия is a 70 year old woman with a diagnosis of CML, who presents to the clinic today for routine follow-up.    Nursing Note and documentation reviewed: yes    Interval History: Doing well. Biggest complaint today has been that of diarrhea. Notes that this could be the food she is eating (dairy, greasy), but also she was diagnosed with C diff in Feb. She did finish a script of Vancomycin. This is intermittent. She is, per request of PCP, seeing GI in a couple weeks.     She has continued her Gleevec daily. No other recent infections. No bleeding concerns. No nausea or vomiting. No skin concerns. No night sweats. No abdominal pain except for during diarrhea at times. Eating well. Energy good. All in all, doing well otherwise.     Oncologic History:   Patient was initially seen on 11/11/20 to evaluate concerning her diagnosis of chronic myelogenous leukemia.  Patient was initially diagnosed in 2003. She was living in [place] and presented with hyperleukocytosis, splenomegaly, and was diagnosed with CML with BCR-ABL translocation p210 gene being translocated.  She was treated initially with Hydrea by Dr. Asad Escobar at [facility] and then placed on Gleevec or imatinib 400 mg p.o. daily.      She had been followed since then by the nurse practitioner in Hematology/Oncology in [facility].  Patient moved to the [facility] and established care with us.     She has tolerated imatinib well. Initially, she had problems with diarrhea. She was switched to generic Gleevec and then symptoms seemed to improve. She continued to be in molecular cytogenetic response.     Patient was seen on 07/14/2022 and there was concern about a white count being low at 3.1. In July 2022, her ANC was 1.5, hemoglobin 11.4. The patient was concerned about her mild anemia.  When patient was seen on 11/10/2022, the plan was to obtain a peripheral blood smear and  obtain iron studies, B12, folate, sed rate, rheumatoid factor, LYUBOV.  Peripheral blood smear was unremarkable.  No evidence of blasts.  Her ferritin came back normal at 68, but her B12 level came back low at 175.     She was placed on oral B12 and continues on this.     She has otherwise been followed for surveillance.     Current Chemo Regimen/TX:Gleevec 400 mg daily      Past Medical History:   Diagnosis Date    CML (chronic myelocytic leukemia) (H) 2003       Past Surgical History:   Procedure Laterality Date    AS BLEPHAROPLASTY UPPER LID W EXCESS SKIN Bilateral     BUNIONECTOMY Left 2019    COLONOSCOPY  2011    CYSTECTOMY OVARIAN BENIGN      IR PORT CHECK RIGHT  8/7/2020    OPEN REDUCTION INTERNAL FIXATION FOOT Right 01/2020    Paul's fracture       Family History   Problem Relation Age of Onset    Alzheimer Disease Mother         92    Heart Disease Father         MI at 71    Colon Polyps Sister         Possible cancer       Social History     Socioeconomic History    Marital status:      Spouse name: Not on file    Number of children: Not on file    Years of education: Not on file    Highest education level: Not on file   Occupational History    Not on file   Tobacco Use    Smoking status: Never     Passive exposure: Yes    Smokeless tobacco: Never    Tobacco comments:     Passive exposure in childhood home.    Substance and Sexual Activity    Alcohol use: Yes     Comment: 1 glass of wine daily    Drug use: Never    Sexual activity: Yes     Partners: Male   Other Topics Concern    Parent/sibling w/ CABG, MI or angioplasty before 65F 55M? Not Asked   Social History Narrative    , moving from [place] to [facility].  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     Social Drivers of Health     Financial Resource Strain: Low Risk  (7/26/2023)    Received from [facility], [facility]    Overall Financial Resource Strain  (CARDIA)     Difficulty of Paying Living Expenses: Not very hard   Food Insecurity: No Food Insecurity (2/18/2025)    [facility]    Hunger Vital Sign     Worried About Running Out of Food in the Last Year: Never true     Ran Out of Food in the Last Year: Never true   Transportation Needs: No Transportation Needs (2/18/2025)    Received from [facility]    PRAPARE - Transportation     Lack of Transportation (Medical): No     Lack of Transportation (Non-Medical): No   Physical Activity: Sufficiently Active (8/14/2024)    Received from [facility]    Exercise Vital Sign     Days of Exercise per Week: 5 days     Minutes of Exercise per Session: 30 min   Stress: No Stress Concern Present (8/14/2024)    [facility]    [employer] - Occupational Stress Questionnaire     Feeling of Stress : Only a little   Social Connections: Socially Integrated (8/14/2024)    Received from [facility]    Social Connection and Isolation Panel [NHANES]     Frequency of Communication with Friends and Family: Three times a week     Frequency of Social Gatherings with Friends and Family: More than three times a week     Attends [employer] Services: More than 4 times per year     Active Member of Clubs or Organizations: Yes     Attends Club or Organization Meetings: More than 4 times per year     Marital Status:    Interpersonal Safety: Low Risk  (1/11/2024)    Interpersonal Safety     Do you feel physically and emotionally safe where you currently live?: Yes     Within the past 12 months, have you been hit, slapped, kicked or otherwise physically hurt by someone?: No     Within the past 12 months, have you been humiliated or emotionally abused in other ways by your partner or ex-partner?: No   Housing  "Stability: Unknown (2/18/2025)    Received from [facility]    Housing Stability Vital Sign     Unable to Pay for Housing in the Last Year: No     Number of Times Moved in the Last Year: Not on file     Homeless in the Last Year: No       Current Outpatient Medications   Medication Sig Dispense Refill    cyanocobalamin (VITAMIN B-12) 1000 MCG tablet Take 1,000 mcg by mouth daily      imatinib (GLEEVEC) 400 MG tablet Take 1 tablet (400 mg) by mouth daily Take with a meal and a large glass of water. 30 tablet 0    multivitamin (CENTRUM SILVER) tablet Take 1 tablet by mouth daily      saccharomyces boulardii (FLORASTOR) 250 MG capsule Take 250 mg by mouth daily. \"Probiotic\" - PB-8      betamethasone dipropionate (DIPROSONE) 0.05 % external lotion Apply topically daily as needed (rash). (Patient not taking: Reported on 3/12/2025)       No current facility-administered medications for this visit.        No Known Allergies    Review Of Systems:  A complete review of systems is negative except for the above mentioned items in the interval history.     ECOG Performance Status: 0    Physical Exam:  /69 (BP Location: Right arm, Patient Position: Sitting, Cuff Size: Adult Regular)   Pulse 98   Temp 98.1  F (36.7  C) (Tympanic)   Resp 12   Wt 77.6 kg (171 lb)   SpO2 98%   BMI 28.46 kg/m    GENERAL APPEARANCE: Healthy, alert and in no acute distress.  HEENT: Eyes appear normal without scleral icterus. Extraocular movements intact.   NECK:   Supple with normal range of motion. No asymmetry or masses.  LYMPHATICS: No palpable cervical, supraclavicular nodes.  RESP: Lungs clear to auscultation bilaterally, respirations regular and easy.  CARDIOVASCULAR: Regular rate and rhythm. Normal S1, S2; no murmur, gallop, or rub.  ABDOMEN: Soft, non-tender, non-distended. Bowel sounds auscultated all 4 quadrants. No palpable organomegaly or masses.  MUSCULOSKELETAL: Extremities without gross " deformities noted. No edema of bilateral lower extremities.  SKIN: No suspicious lesions or rashes.  NEURO: Alert and oriented x 3.  Gait steady.  PSYCHIATRIC: Mentation and affect appear normal.  Mood appropriate.    Laboratory:  Component      Latest Ref Rng 3/4/2025  9:05 AM   WBC      4.0 - 11.0 10e3/uL 4.1    RBC Count      3.80 - 5.20 10e6/uL 3.19 (L)    Hemoglobin      11.7 - 15.7 g/dL 11.3 (L)    Hematocrit      35.0 - 47.0 % 32.3 (L)    MCV      78 - 100 fL 101 (H)    MCH      26.5 - 33.0 pg 35.4 (H)    MCHC      31.5 - 36.5 g/dL 35.0    RDW      10.0 - 15.0 % 14.0    Platelet Count      150 - 450 10e3/uL 228    % Neutrophils      % 33    % Lymphocytes      % 55    % Monocytes      % 10    % Eosinophils      % 1    % Basophils      % 1    % Immature Granulocytes      % 0    NRBCs per 100 WBC      <1 /100 0    Absolute Neutrophils      1.6 - 8.3 10e3/uL 1.4 (L)    Absolute Lymphocytes      0.8 - 5.3 10e3/uL 2.3    Absolute Monocytes      0.0 - 1.3 10e3/uL 0.4    Absolute Eosinophils      0.0 - 0.7 10e3/uL 0.0    Absolute Basophils      0.0 - 0.2 10e3/uL 0.0    Absolute Immature Granulocytes      <=0.4 10e3/uL 0.0    Absolute NRBCs      10e3/uL 0.0    Sodium      135 - 145 mmol/L 139    Potassium      3.4 - 5.3 mmol/L 4.1    Carbon Dioxide (CO2)      22 - 29 mmol/L 25    Anion Gap      7 - 15 mmol/L 11    Urea Nitrogen      8.0 - 23.0 mg/dL 16.3    Creatinine      0.51 - 0.95 mg/dL 1.02 (H)    GFR Estimate      >60 mL/min/1.73m2 59 (L)    Calcium      8.8 - 10.4 mg/dL 9.1    Chloride      98 - 107 mmol/L 103    Glucose      70 - 99 mg/dL 93    Alkaline Phosphatase      40 - 150 U/L 83    AST      0 - 45 U/L 31    ALT      0 - 50 U/L 21    Protein Total      6.4 - 8.3 g/dL 6.9    Albumin      3.5 - 5.2 g/dL 4.3    Bilirubin Total      <=1.2 mg/dL 0.3    RESULTS BCR::ABL1 MAJOR(p210) QUANTITATION RESULTS:     RESULTS BCR::ABL1 MINOR(p190) QUANTITATION RESULTS:     INTERPRETATION INTERPRETATION:      INTERPRETATION Molecular testing performed on submitted Blood.     COMMENTS The limit of sensitivity of the quantitative BCR::ABL1 major (p210) assay is 10 copies of the BCR::ABL transcripts.      COMMENTS The limit of sensitivity of the quantitative BCR::ABL1 assay is 10 copies of the BCR::ABL1 transcripts.     METHODOLOGY Total cellular RNA was extracted from above specimen and reverse transcribed to cDNA via random hexamer priming. The cDNA was amplified by a real time quantitative PCR assay (using an [employer] 3 Real-time analyzer) with an ABL1 specific primer and a primer specific for exon 13 of the BCR gene. An internal control (ABL1 gene), was amplified to confirm the presence of patient RNA. The presence of at least 1000 copies of the ABL1 gene was considered a cutoff value for determining an adequate specimen for quantitative analysis of the BCR::ABL1 translocation. The results are reported as a ratio of the number of BCR::ABL1 copies to the number of ABL1 copies to normalize for differences in amount of RNA in the reaction and enable serial monitoring of the BCR::ABL1 transcript numbers.  This test has been calibrated to the international scale(IS) using a validated reference standard.    METHODOLOGY Total cellular RNA was extracted from above specimen and reverse transcribed to cDNA via random hexamer priming. The cDNA was amplified by a real time quantitative PCR assay (using an [employer] 3 Real-time analyzer) with an ABL1 specific primer and a primer specific for exon 1 of the BCR gene. An internal control (ABL1 gene), was amplified to confirm the presence of patient RNA. The presence of at least 1000 copies of the ABL1 gene was considered a cutoff value for determining an adequate specimen for quantitative analysis of the BCR::ABL1 translocation. The results are reported as a ratio of the number of BCR::ABL1 copies to the number of ABL1 copies to normalize for differences in amount of RNA  in the reaction and enable serial monitoring of the BCR::ABL1 transcript numbers.    DISCLAIMER This test was developed and its performance characteristics determined by  [email or web address]. It has not been cleared or approved by the FDA. The laboratory is regulated under CLIA as qualified to perform high-complexity testing. This test is used for clinical purposes. It should not be regarded as investigational or for research.     DISCLAIMER This test was developed and its performance characteristics determined by  [facility]. It has not been cleared or approved by the FDA. The laboratory is regulated under CLIA as qualified to perform high-complexity testing. This test is used for clinical purposes. It should not be regarded as investigational or for research.     Specimen Description Blood: ACD x2    Specimen Description Blood: ACD x2    Lactate Dehydrogenase      0 - 250 U/L 228       Legend:  (L) Low  (H) High    Imaging Studies:    None this visit    ASSESSMENT/PLAN:    1. Chronic myelogenous leukemia diagnosed in 2003 and currently with molecular hematologic and cytogenetic remission on imatinib 400 mg daily. Labs show no BCR/ABL transcripts detected.  We will continue Gleevec at current dose, but will need to monitor blood counts.  We will see the patient in 6 months.  Obtain CBC, CMP, LDH, and BCR-ABL transcript.     2. Diarrhea Could be multifactorial. Gleevec vs c diff vs food. Recommended she eat bland for the next couple weeks until seen by GI.     3. Hx AR  4. Hx B12 deficiency  Offered labs today. Totally fine to wait until next visit. Will recheck levels as she questions need for ongoing B12.       Patient in agreement with plan and verbalizes understanding. Agrees to call with any questions or concerns.    44 minutes spent in the patient's encounter today with time spent in review of patient's chart along with chart preparation  and review of the treatment plan and signing of treatment plan.  Time was also spent with the patient in obtaining a review of systems and performing a physical exam along with detailed review of all test results. Time was also spent in discussing plan for future follow-up and relating instructions for follow-up and in placing future orders.    GLENN Mackey [facility]  Medical Oncology

## 2025-07-30 ENCOUNTER — TELEPHONE (OUTPATIENT)
Dept: ONCOLOGY | Facility: CLINIC | Age: 70
End: 2025-07-30
Payer: MEDICARE

## 2025-07-30 NOTE — TELEPHONE ENCOUNTER
PA Initiation    Medication: IMATINIB MESYLATE 400 MG PO TABS  Insurance Company: [employer] - Phone [phone] Fax [phone]  Pharmacy Filling the Rx:    Filling Pharmacy Phone:    Filling Pharmacy Fax:    Start Date: 7/30/2025

## 2025-07-30 NOTE — TELEPHONE ENCOUNTER
Prior Authorization Approval    Medication: IMATINIB MESYLATE 400 MG PO TABS  Authorization Effective Date: 7/30/2025  Authorization Expiration Date: 12/31/2099  Approved Dose/Quantity: 30/30d  Reference #: [id number]   Insurance Company: [employer] - Phone [phone] Fax [phone]  Expected CoPay: $    CoPay Card Available:      Financial Assistance Needed:    Which Pharmacy is filling the prescription:    Pharmacy Notified:    Patient Notified:

## 2025-08-26 ENCOUNTER — HOSPITAL ENCOUNTER (EMERGENCY)
Facility: OTHER | Age: 70
Discharge: HOME | End: 2025-08-26
Attending: STUDENT IN AN ORGANIZED HEALTH CARE EDUCATION/TRAINING PROGRAM
Payer: MEDICARE

## 2025-08-26 VITALS
SYSTOLIC BLOOD PRESSURE: 135 MMHG | WEIGHT: 160 LBS | HEART RATE: 111 BPM | HEIGHT: 66 IN | DIASTOLIC BLOOD PRESSURE: 61 MMHG | TEMPERATURE: 99.4 F | RESPIRATION RATE: 16 BRPM | OXYGEN SATURATION: 99 % | BODY MASS INDEX: 25.71 KG/M2

## 2025-08-26 DIAGNOSIS — U07.1 COVID-19 VIRUS INFECTION: Primary | ICD-10-CM

## 2025-08-26 LAB
FLUAV RNA SPEC QL NAA+PROBE: NEGATIVE
FLUBV RNA RESP QL NAA+PROBE: NEGATIVE
RSV RNA SPEC NAA+PROBE: NEGATIVE
SARS-COV-2 RNA RESP QL NAA+PROBE: POSITIVE

## 2025-08-26 PROCEDURE — 87637 SARSCOV2&INF A&B&RSV AMP PRB: CPT | Performed by: STUDENT IN AN ORGANIZED HEALTH CARE EDUCATION/TRAINING PROGRAM

## 2025-08-26 PROCEDURE — 99283 EMERGENCY DEPT VISIT LOW MDM: CPT | Performed by: STUDENT IN AN ORGANIZED HEALTH CARE EDUCATION/TRAINING PROGRAM

## 2025-08-26 ASSESSMENT — ACTIVITIES OF DAILY LIVING (ADL): ADLS_ACUITY_SCORE: 47

## (undated) RX ORDER — SODIUM CHLORIDE, SODIUM LACTATE, POTASSIUM CHLORIDE, CALCIUM CHLORIDE 600; 310; 30; 20 MG/100ML; MG/100ML; MG/100ML; MG/100ML
INJECTION, SOLUTION INTRAVENOUS
Status: DISPENSED
Start: 2020-08-07

## (undated) RX ORDER — SODIUM CHLORIDE 9 MG/ML
INJECTION, SOLUTION INTRAVENOUS
Status: DISPENSED
Start: 2020-08-07

## (undated) RX ORDER — ONDANSETRON 2 MG/ML
INJECTION INTRAMUSCULAR; INTRAVENOUS
Status: DISPENSED
Start: 2020-08-07

## (undated) RX ORDER — LIDOCAINE HYDROCHLORIDE 10 MG/ML
INJECTION, SOLUTION EPIDURAL; INFILTRATION; INTRACAUDAL; PERINEURAL
Status: DISPENSED
Start: 2020-08-07